# Patient Record
Sex: FEMALE | Race: WHITE | NOT HISPANIC OR LATINO | Employment: OTHER | URBAN - METROPOLITAN AREA
[De-identification: names, ages, dates, MRNs, and addresses within clinical notes are randomized per-mention and may not be internally consistent; named-entity substitution may affect disease eponyms.]

---

## 2020-07-01 ENCOUNTER — OFFICE VISIT (OUTPATIENT)
Dept: INTERNAL MEDICINE | Facility: CLINIC | Age: 80
End: 2020-07-01
Payer: MEDICARE

## 2020-07-01 ENCOUNTER — LAB VISIT (OUTPATIENT)
Dept: LAB | Facility: HOSPITAL | Age: 80
End: 2020-07-01
Attending: HOSPITALIST
Payer: MEDICARE

## 2020-07-01 VITALS
WEIGHT: 234.81 LBS | DIASTOLIC BLOOD PRESSURE: 90 MMHG | TEMPERATURE: 97 F | BODY MASS INDEX: 46.1 KG/M2 | HEART RATE: 76 BPM | SYSTOLIC BLOOD PRESSURE: 150 MMHG | RESPIRATION RATE: 16 BRPM | HEIGHT: 60 IN

## 2020-07-01 DIAGNOSIS — N93.9 ABNORMAL VAGINAL BLEEDING: ICD-10-CM

## 2020-07-01 DIAGNOSIS — I82.502 CHRONIC DEEP VEIN THROMBOSIS (DVT) OF LEFT LOWER EXTREMITY, UNSPECIFIED VEIN: ICD-10-CM

## 2020-07-01 DIAGNOSIS — Z01.00 EYE EXAM, ROUTINE: ICD-10-CM

## 2020-07-01 DIAGNOSIS — Z78.0 POST-MENOPAUSAL: ICD-10-CM

## 2020-07-01 DIAGNOSIS — E66.01 CLASS 3 SEVERE OBESITY DUE TO EXCESS CALORIES WITH SERIOUS COMORBIDITY AND BODY MASS INDEX (BMI) OF 45.0 TO 49.9 IN ADULT: ICD-10-CM

## 2020-07-01 DIAGNOSIS — Z12.12 SCREENING FOR COLORECTAL CANCER: ICD-10-CM

## 2020-07-01 DIAGNOSIS — I10 ESSENTIAL HYPERTENSION: ICD-10-CM

## 2020-07-01 DIAGNOSIS — Z00.00 ENCOUNTER FOR PREVENTIVE HEALTH EXAMINATION: Primary | ICD-10-CM

## 2020-07-01 DIAGNOSIS — Z12.11 SCREENING FOR COLORECTAL CANCER: ICD-10-CM

## 2020-07-01 DIAGNOSIS — I87.2 CHRONIC VENOUS INSUFFICIENCY: ICD-10-CM

## 2020-07-01 DIAGNOSIS — F17.200 TOBACCO DEPENDENCE: ICD-10-CM

## 2020-07-01 PROBLEM — E66.813 CLASS 3 SEVERE OBESITY DUE TO EXCESS CALORIES WITH SERIOUS COMORBIDITY AND BODY MASS INDEX (BMI) OF 45.0 TO 49.9 IN ADULT: Status: ACTIVE | Noted: 2020-07-01

## 2020-07-01 LAB
25(OH)D3+25(OH)D2 SERPL-MCNC: 19 NG/ML (ref 30–96)
ALBUMIN SERPL BCP-MCNC: 3.3 G/DL (ref 3.5–5.2)
ALP SERPL-CCNC: 82 U/L (ref 55–135)
ALT SERPL W/O P-5'-P-CCNC: 11 U/L (ref 10–44)
ANION GAP SERPL CALC-SCNC: 7 MMOL/L (ref 8–16)
AST SERPL-CCNC: 16 U/L (ref 10–40)
BASOPHILS # BLD AUTO: 0.06 K/UL (ref 0–0.2)
BASOPHILS NFR BLD: 0.8 % (ref 0–1.9)
BILIRUB SERPL-MCNC: 0.6 MG/DL (ref 0.1–1)
BUN SERPL-MCNC: 16 MG/DL (ref 8–23)
CALCIUM SERPL-MCNC: 9.4 MG/DL (ref 8.7–10.5)
CHLORIDE SERPL-SCNC: 106 MMOL/L (ref 95–110)
CHOLEST SERPL-MCNC: 178 MG/DL (ref 120–199)
CHOLEST/HDLC SERPL: 4.3 {RATIO} (ref 2–5)
CO2 SERPL-SCNC: 26 MMOL/L (ref 23–29)
CREAT SERPL-MCNC: 1.1 MG/DL (ref 0.5–1.4)
DIFFERENTIAL METHOD: ABNORMAL
EOSINOPHIL # BLD AUTO: 0.3 K/UL (ref 0–0.5)
EOSINOPHIL NFR BLD: 4.1 % (ref 0–8)
ERYTHROCYTE [DISTWIDTH] IN BLOOD BY AUTOMATED COUNT: 13.5 % (ref 11.5–14.5)
EST. GFR  (AFRICAN AMERICAN): 54.8 ML/MIN/1.73 M^2
EST. GFR  (NON AFRICAN AMERICAN): 47.5 ML/MIN/1.73 M^2
GLUCOSE SERPL-MCNC: 92 MG/DL (ref 70–110)
HCT VFR BLD AUTO: 46 % (ref 37–48.5)
HDLC SERPL-MCNC: 41 MG/DL (ref 40–75)
HDLC SERPL: 23 % (ref 20–50)
HGB BLD-MCNC: 14.5 G/DL (ref 12–16)
IMM GRANULOCYTES # BLD AUTO: 0.05 K/UL (ref 0–0.04)
IMM GRANULOCYTES NFR BLD AUTO: 0.7 % (ref 0–0.5)
LDLC SERPL CALC-MCNC: 105.4 MG/DL (ref 63–159)
LYMPHOCYTES # BLD AUTO: 2.2 K/UL (ref 1–4.8)
LYMPHOCYTES NFR BLD: 28.8 % (ref 18–48)
MCH RBC QN AUTO: 30.7 PG (ref 27–31)
MCHC RBC AUTO-ENTMCNC: 31.5 G/DL (ref 32–36)
MCV RBC AUTO: 97 FL (ref 82–98)
MONOCYTES # BLD AUTO: 0.5 K/UL (ref 0.3–1)
MONOCYTES NFR BLD: 6.8 % (ref 4–15)
NEUTROPHILS # BLD AUTO: 4.5 K/UL (ref 1.8–7.7)
NEUTROPHILS NFR BLD: 58.8 % (ref 38–73)
NONHDLC SERPL-MCNC: 137 MG/DL
NRBC BLD-RTO: 0 /100 WBC
PLATELET # BLD AUTO: 245 K/UL (ref 150–350)
PMV BLD AUTO: 9.9 FL (ref 9.2–12.9)
POTASSIUM SERPL-SCNC: 4.6 MMOL/L (ref 3.5–5.1)
PROT SERPL-MCNC: 7.5 G/DL (ref 6–8.4)
RBC # BLD AUTO: 4.73 M/UL (ref 4–5.4)
SODIUM SERPL-SCNC: 139 MMOL/L (ref 136–145)
TRIGL SERPL-MCNC: 158 MG/DL (ref 30–150)
TSH SERPL DL<=0.005 MIU/L-ACNC: 1.64 UIU/ML (ref 0.4–4)
WBC # BLD AUTO: 7.65 K/UL (ref 3.9–12.7)

## 2020-07-01 PROCEDURE — 99999 PR PBB SHADOW E&M-EST. PATIENT-LVL V: ICD-10-PCS | Mod: PBBFAC,,, | Performed by: HOSPITALIST

## 2020-07-01 PROCEDURE — 82306 VITAMIN D 25 HYDROXY: CPT

## 2020-07-01 PROCEDURE — 80061 LIPID PANEL: CPT

## 2020-07-01 PROCEDURE — 99205 OFFICE O/P NEW HI 60 MIN: CPT | Mod: S$PBB,,, | Performed by: HOSPITALIST

## 2020-07-01 PROCEDURE — 80053 COMPREHEN METABOLIC PANEL: CPT

## 2020-07-01 PROCEDURE — 84443 ASSAY THYROID STIM HORMONE: CPT

## 2020-07-01 PROCEDURE — 85025 COMPLETE CBC W/AUTO DIFF WBC: CPT

## 2020-07-01 PROCEDURE — 99215 OFFICE O/P EST HI 40 MIN: CPT | Mod: PBBFAC,PO,25 | Performed by: HOSPITALIST

## 2020-07-01 PROCEDURE — 99999 PR PBB SHADOW E&M-EST. PATIENT-LVL V: CPT | Mod: PBBFAC,,, | Performed by: HOSPITALIST

## 2020-07-01 PROCEDURE — 36415 COLL VENOUS BLD VENIPUNCTURE: CPT | Mod: PO

## 2020-07-01 PROCEDURE — G0009 ADMIN PNEUMOCOCCAL VACCINE: HCPCS | Mod: PBBFAC,PO

## 2020-07-01 PROCEDURE — 99205 PR OFFICE/OUTPT VISIT, NEW, LEVL V, 60-74 MIN: ICD-10-PCS | Mod: S$PBB,,, | Performed by: HOSPITALIST

## 2020-07-01 RX ORDER — IBUPROFEN 200 MG
200 TABLET ORAL EVERY 6 HOURS PRN
Status: ON HOLD | COMMUNITY
End: 2021-11-29 | Stop reason: HOSPADM

## 2020-07-01 RX ORDER — TRIAMCINOLONE ACETONIDE 1 MG/G
CREAM TOPICAL 2 TIMES DAILY
Status: ON HOLD | COMMUNITY
Start: 2020-04-29 | End: 2021-07-30 | Stop reason: HOSPADM

## 2020-07-09 ENCOUNTER — TELEPHONE (OUTPATIENT)
Dept: INTERNAL MEDICINE | Facility: CLINIC | Age: 80
End: 2020-07-09

## 2020-07-09 NOTE — TELEPHONE ENCOUNTER
The patient is requesting a medication for the arthritis pain in her back. Ibuprofen is the only medication on her list.  Please advise.

## 2020-07-09 NOTE — TELEPHONE ENCOUNTER
----- Message from Fallon Ornelas sent at 7/9/2020  4:34 PM CDT -----  Contact: Brianna(daughter)[phone]  Pt daughter states she is calling to follow up on a previous message about her mother. Pt also states she called in about her medication for her back. Please call and advise.

## 2020-07-09 NOTE — TELEPHONE ENCOUNTER
----- Message from Daniela Acosta sent at 7/9/2020 12:26 PM CDT -----  Regarding: Pt self Mobile/Home [phone]  Patient would like a call back in regards to her saying that she was diagnosed with arthritis and she was told that the nurse would find out what type of pain medication she can take for it but she never received a call back.

## 2020-07-10 ENCOUNTER — TELEPHONE (OUTPATIENT)
Dept: TRANSPLANT | Facility: CLINIC | Age: 80
End: 2020-07-10

## 2020-07-10 NOTE — TELEPHONE ENCOUNTER
----- Message from Jennifer Schwartz MD sent at 7/10/2020  7:50 AM CDT -----  Please notify pt:  - Electrolytes, liver are normal. Has kidney disease. Will monitor this periodically  - CBC is normal/acceptable range; no signs of anemia  - TSH (thyroid) level is normal  - Vitamin D level is low; Vitamin D is important for healthy bones and calcium absorption. Recommend a supplement of vitamin D3 1000 I.u daily can find over the counter    - Cholesterol panel is normal/acceptable

## 2020-07-10 NOTE — TELEPHONE ENCOUNTER
----- Message from Ruma Cates sent at 7/10/2020  3:57 PM CDT -----    ----- Message -----  From: Aleena Lima MA  Sent: 7/9/2020   5:04 PM CDT  To: Ruma Cates      ----- Message -----  From: Greg Mcfarlane  Sent: 7/9/2020   4:26 PM CDT  To: Hepatology Scheduling    Pt calling to schedule an appt       [phone]

## 2020-10-26 ENCOUNTER — TELEPHONE (OUTPATIENT)
Dept: INTERNAL MEDICINE | Facility: CLINIC | Age: 80
End: 2020-10-26

## 2020-10-26 DIAGNOSIS — R41.3 MEMORY LOSS: Primary | ICD-10-CM

## 2020-10-26 NOTE — TELEPHONE ENCOUNTER
Referral sent to referral team, referral team will call to make appt with pt daughter Ms VasquezLdpztau-[phone]. Spoke with pt daughter marquise to explain referral has been sent for her mother, and referral team will call to schedule appt.

## 2020-10-26 NOTE — TELEPHONE ENCOUNTER
----- Message from Kedar Leach sent at 10/26/2020  2:26 PM CDT -----  Regarding: Appt  Contact: Daughter Ms VasquezMpahmid-[phone]  Sooner appointment than the  can schedule.  Did you offer to schedule the next available appointment and put the patient on the wait list?:    When is the first available appointment: 12/01/20  What is the nature of the appointment: Memory lose   What visit type: EP   Patient preference of timeframe to be scheduled:    Comments:     Please call an advise  Thank you

## 2021-03-19 ENCOUNTER — TELEPHONE (OUTPATIENT)
Dept: INTERNAL MEDICINE | Facility: CLINIC | Age: 81
End: 2021-03-19

## 2021-07-21 ENCOUNTER — HOSPITAL ENCOUNTER (INPATIENT)
Facility: HOSPITAL | Age: 81
LOS: 9 days | Discharge: NURSING, REHAB OR LONG-TERM CARE | DRG: 299 | End: 2021-07-30
Attending: EMERGENCY MEDICINE | Admitting: HOSPITALIST
Payer: MEDICARE

## 2021-07-21 ENCOUNTER — TELEPHONE (OUTPATIENT)
Dept: INTERNAL MEDICINE | Facility: CLINIC | Age: 81
End: 2021-07-21

## 2021-07-21 DIAGNOSIS — R53.81 DEBILITY: ICD-10-CM

## 2021-07-21 DIAGNOSIS — R00.0 TACHYCARDIA: ICD-10-CM

## 2021-07-21 DIAGNOSIS — I10 ESSENTIAL HYPERTENSION: ICD-10-CM

## 2021-07-21 DIAGNOSIS — N17.9 AKI (ACUTE KIDNEY INJURY): ICD-10-CM

## 2021-07-21 DIAGNOSIS — F17.200 TOBACCO DEPENDENCE: ICD-10-CM

## 2021-07-21 DIAGNOSIS — U07.1 COVID-19: ICD-10-CM

## 2021-07-21 DIAGNOSIS — R00.0 TACHYCARDIA, UNSPECIFIED: ICD-10-CM

## 2021-07-21 DIAGNOSIS — I87.2 CHRONIC VENOUS INSUFFICIENCY: ICD-10-CM

## 2021-07-21 DIAGNOSIS — M79.89 SWELLING OF LOWER LEG: ICD-10-CM

## 2021-07-21 DIAGNOSIS — E55.9 VITAMIN D DEFICIENCY: ICD-10-CM

## 2021-07-21 DIAGNOSIS — I82.4Y1 ACUTE DEEP VEIN THROMBOSIS (DVT) OF PROXIMAL VEIN OF RIGHT LOWER EXTREMITY: Primary | ICD-10-CM

## 2021-07-21 LAB
ALBUMIN SERPL BCP-MCNC: 2.3 G/DL (ref 3.5–5.2)
ALP SERPL-CCNC: 142 U/L (ref 55–135)
ALT SERPL W/O P-5'-P-CCNC: 19 U/L (ref 10–44)
ANION GAP SERPL CALC-SCNC: 13 MMOL/L (ref 8–16)
APTT BLDCRRT: 29.1 SEC (ref 21–32)
AST SERPL-CCNC: 44 U/L (ref 10–40)
BASOPHILS # BLD AUTO: 0.05 K/UL (ref 0–0.2)
BASOPHILS NFR BLD: 0.6 % (ref 0–1.9)
BILIRUB SERPL-MCNC: 0.5 MG/DL (ref 0.1–1)
BNP SERPL-MCNC: 97 PG/ML (ref 0–99)
BUN SERPL-MCNC: 19 MG/DL (ref 8–23)
CALCIUM SERPL-MCNC: 8.1 MG/DL (ref 8.7–10.5)
CHLORIDE SERPL-SCNC: 103 MMOL/L (ref 95–110)
CO2 SERPL-SCNC: 18 MMOL/L (ref 23–29)
CREAT SERPL-MCNC: 1.6 MG/DL (ref 0.5–1.4)
CTP QC/QA: YES
DIFFERENTIAL METHOD: ABNORMAL
EOSINOPHIL # BLD AUTO: 0 K/UL (ref 0–0.5)
EOSINOPHIL NFR BLD: 0.1 % (ref 0–8)
ERYTHROCYTE [DISTWIDTH] IN BLOOD BY AUTOMATED COUNT: 14.8 % (ref 11.5–14.5)
EST. GFR  (AFRICAN AMERICAN): 34.6 ML/MIN/1.73 M^2
EST. GFR  (NON AFRICAN AMERICAN): 30 ML/MIN/1.73 M^2
GLUCOSE SERPL-MCNC: 86 MG/DL (ref 70–110)
HCT VFR BLD AUTO: 44.4 % (ref 37–48.5)
HGB BLD-MCNC: 14.8 G/DL (ref 12–16)
IMM GRANULOCYTES # BLD AUTO: 0.09 K/UL (ref 0–0.04)
IMM GRANULOCYTES NFR BLD AUTO: 1.2 % (ref 0–0.5)
INR PPP: 1.2 (ref 0.8–1.2)
LYMPHOCYTES # BLD AUTO: 1 K/UL (ref 1–4.8)
LYMPHOCYTES NFR BLD: 12.3 % (ref 18–48)
MCH RBC QN AUTO: 30.3 PG (ref 27–31)
MCHC RBC AUTO-ENTMCNC: 33.3 G/DL (ref 32–36)
MCV RBC AUTO: 91 FL (ref 82–98)
MONOCYTES # BLD AUTO: 0.6 K/UL (ref 0.3–1)
MONOCYTES NFR BLD: 7.9 % (ref 4–15)
NEUTROPHILS # BLD AUTO: 6 K/UL (ref 1.8–7.7)
NEUTROPHILS NFR BLD: 77.9 % (ref 38–73)
NRBC BLD-RTO: 0 /100 WBC
PLATELET # BLD AUTO: 247 K/UL (ref 150–450)
PMV BLD AUTO: 10.6 FL (ref 9.2–12.9)
POTASSIUM SERPL-SCNC: 4.2 MMOL/L (ref 3.5–5.1)
PROT SERPL-MCNC: 7.4 G/DL (ref 6–8.4)
PROTHROMBIN TIME: 12.8 SEC (ref 9–12.5)
RBC # BLD AUTO: 4.88 M/UL (ref 4–5.4)
SARS-COV-2 RDRP RESP QL NAA+PROBE: POSITIVE
SODIUM SERPL-SCNC: 134 MMOL/L (ref 136–145)
WBC # BLD AUTO: 7.75 K/UL (ref 3.9–12.7)

## 2021-07-21 PROCEDURE — 25000003 PHARM REV CODE 250: Performed by: HOSPITALIST

## 2021-07-21 PROCEDURE — 99223 1ST HOSP IP/OBS HIGH 75: CPT | Mod: ,,, | Performed by: HOSPITALIST

## 2021-07-21 PROCEDURE — 85730 THROMBOPLASTIN TIME PARTIAL: CPT | Performed by: PHYSICIAN ASSISTANT

## 2021-07-21 PROCEDURE — 12000002 HC ACUTE/MED SURGE SEMI-PRIVATE ROOM

## 2021-07-21 PROCEDURE — 63600175 PHARM REV CODE 636 W HCPCS: Performed by: EMERGENCY MEDICINE

## 2021-07-21 PROCEDURE — 83880 ASSAY OF NATRIURETIC PEPTIDE: CPT | Performed by: PHYSICIAN ASSISTANT

## 2021-07-21 PROCEDURE — 25000003 PHARM REV CODE 250: Performed by: PHYSICIAN ASSISTANT

## 2021-07-21 PROCEDURE — 99285 PR EMERGENCY DEPT VISIT,LEVEL V: ICD-10-PCS | Mod: CR,CS,, | Performed by: PHYSICIAN ASSISTANT

## 2021-07-21 PROCEDURE — 85025 COMPLETE CBC W/AUTO DIFF WBC: CPT | Performed by: PHYSICIAN ASSISTANT

## 2021-07-21 PROCEDURE — 96360 HYDRATION IV INFUSION INIT: CPT

## 2021-07-21 PROCEDURE — 99285 EMERGENCY DEPT VISIT HI MDM: CPT | Mod: 25

## 2021-07-21 PROCEDURE — 99285 EMERGENCY DEPT VISIT HI MDM: CPT | Mod: CR,CS,, | Performed by: PHYSICIAN ASSISTANT

## 2021-07-21 PROCEDURE — 80053 COMPREHEN METABOLIC PANEL: CPT | Performed by: PHYSICIAN ASSISTANT

## 2021-07-21 PROCEDURE — 99223 PR INITIAL HOSPITAL CARE,LEVL III: ICD-10-PCS | Mod: ,,, | Performed by: HOSPITALIST

## 2021-07-21 PROCEDURE — 85610 PROTHROMBIN TIME: CPT | Performed by: PHYSICIAN ASSISTANT

## 2021-07-21 PROCEDURE — U0002 COVID-19 LAB TEST NON-CDC: HCPCS | Performed by: EMERGENCY MEDICINE

## 2021-07-21 RX ORDER — ACETAMINOPHEN 325 MG/1
650 TABLET ORAL EVERY 4 HOURS PRN
Status: DISCONTINUED | OUTPATIENT
Start: 2021-07-21 | End: 2021-07-30 | Stop reason: HOSPADM

## 2021-07-21 RX ORDER — ENOXAPARIN SODIUM 150 MG/ML
1 INJECTION SUBCUTANEOUS
Status: DISCONTINUED | OUTPATIENT
Start: 2021-07-21 | End: 2021-07-21

## 2021-07-21 RX ORDER — ASCORBIC ACID 500 MG
500 TABLET ORAL 2 TIMES DAILY
Status: DISCONTINUED | OUTPATIENT
Start: 2021-07-21 | End: 2021-07-30 | Stop reason: HOSPADM

## 2021-07-21 RX ORDER — ALBUTEROL SULFATE 90 UG/1
2 AEROSOL, METERED RESPIRATORY (INHALATION) EVERY 6 HOURS
Status: DISCONTINUED | OUTPATIENT
Start: 2021-07-22 | End: 2021-07-21

## 2021-07-21 RX ORDER — SODIUM CHLORIDE 0.9 % (FLUSH) 0.9 %
10 SYRINGE (ML) INJECTION
Status: DISCONTINUED | OUTPATIENT
Start: 2021-07-21 | End: 2021-07-30 | Stop reason: HOSPADM

## 2021-07-21 RX ORDER — TALC
6 POWDER (GRAM) TOPICAL NIGHTLY PRN
Status: DISCONTINUED | OUTPATIENT
Start: 2021-07-21 | End: 2021-07-30 | Stop reason: HOSPADM

## 2021-07-21 RX ORDER — IBUPROFEN 200 MG
16 TABLET ORAL
Status: DISCONTINUED | OUTPATIENT
Start: 2021-07-21 | End: 2021-07-30 | Stop reason: HOSPADM

## 2021-07-21 RX ORDER — ONDANSETRON 2 MG/ML
4 INJECTION INTRAMUSCULAR; INTRAVENOUS EVERY 8 HOURS PRN
Status: DISCONTINUED | OUTPATIENT
Start: 2021-07-21 | End: 2021-07-30 | Stop reason: HOSPADM

## 2021-07-21 RX ORDER — PROCHLORPERAZINE EDISYLATE 5 MG/ML
5 INJECTION INTRAMUSCULAR; INTRAVENOUS EVERY 6 HOURS PRN
Status: DISCONTINUED | OUTPATIENT
Start: 2021-07-21 | End: 2021-07-30 | Stop reason: HOSPADM

## 2021-07-21 RX ORDER — SODIUM CHLORIDE 9 MG/ML
INJECTION, SOLUTION INTRAVENOUS CONTINUOUS
Status: ACTIVE | OUTPATIENT
Start: 2021-07-21 | End: 2021-07-22

## 2021-07-21 RX ORDER — HEPARIN SODIUM,PORCINE/D5W 25000/250
0-40 INTRAVENOUS SOLUTION INTRAVENOUS CONTINUOUS
Status: DISCONTINUED | OUTPATIENT
Start: 2021-07-21 | End: 2021-07-22

## 2021-07-21 RX ORDER — IBUPROFEN 200 MG
24 TABLET ORAL
Status: DISCONTINUED | OUTPATIENT
Start: 2021-07-21 | End: 2021-07-30 | Stop reason: HOSPADM

## 2021-07-21 RX ORDER — CHOLECALCIFEROL (VITAMIN D3) 25 MCG
1000 TABLET ORAL DAILY
Status: DISCONTINUED | OUTPATIENT
Start: 2021-07-22 | End: 2021-07-30 | Stop reason: HOSPADM

## 2021-07-21 RX ADMIN — SODIUM CHLORIDE 1000 ML: 0.9 INJECTION, SOLUTION INTRAVENOUS at 06:07

## 2021-07-21 RX ADMIN — HEPARIN SODIUM AND DEXTROSE 18 UNITS/KG/HR: 10000; 5 INJECTION INTRAVENOUS at 08:07

## 2021-07-21 RX ADMIN — Medication 500 MG: at 08:07

## 2021-07-21 RX ADMIN — SODIUM CHLORIDE: 0.9 INJECTION, SOLUTION INTRAVENOUS at 08:07

## 2021-07-22 LAB
25(OH)D3+25(OH)D2 SERPL-MCNC: 8 NG/ML (ref 30–96)
ALBUMIN SERPL BCP-MCNC: 1.7 G/DL (ref 3.5–5.2)
ALP SERPL-CCNC: 104 U/L (ref 55–135)
ALT SERPL W/O P-5'-P-CCNC: 14 U/L (ref 10–44)
ANION GAP SERPL CALC-SCNC: 7 MMOL/L (ref 8–16)
APTT BLDCRRT: >150 SEC (ref 21–32)
AST SERPL-CCNC: 32 U/L (ref 10–40)
BACTERIA #/AREA URNS AUTO: ABNORMAL /HPF
BASOPHILS # BLD AUTO: 0.03 K/UL (ref 0–0.2)
BASOPHILS NFR BLD: 0.4 % (ref 0–1.9)
BILIRUB SERPL-MCNC: 0.4 MG/DL (ref 0.1–1)
BILIRUB UR QL STRIP: NEGATIVE
BUN SERPL-MCNC: 16 MG/DL (ref 8–23)
CALCIUM SERPL-MCNC: 7.3 MG/DL (ref 8.7–10.5)
CHLORIDE SERPL-SCNC: 112 MMOL/L (ref 95–110)
CLARITY UR REFRACT.AUTO: ABNORMAL
CO2 SERPL-SCNC: 20 MMOL/L (ref 23–29)
COLOR UR AUTO: ABNORMAL
CREAT SERPL-MCNC: 1.1 MG/DL (ref 0.5–1.4)
CREAT UR-MCNC: 194 MG/DL (ref 15–325)
DIFFERENTIAL METHOD: ABNORMAL
EOSINOPHIL # BLD AUTO: 0.1 K/UL (ref 0–0.5)
EOSINOPHIL NFR BLD: 1.6 % (ref 0–8)
ERYTHROCYTE [DISTWIDTH] IN BLOOD BY AUTOMATED COUNT: 14.8 % (ref 11.5–14.5)
EST. GFR  (AFRICAN AMERICAN): 54.4 ML/MIN/1.73 M^2
EST. GFR  (NON AFRICAN AMERICAN): 47.2 ML/MIN/1.73 M^2
GLUCOSE SERPL-MCNC: 81 MG/DL (ref 70–110)
GLUCOSE UR QL STRIP: NEGATIVE
HCT VFR BLD AUTO: 36.7 % (ref 37–48.5)
HGB BLD-MCNC: 11.9 G/DL (ref 12–16)
HGB UR QL STRIP: ABNORMAL
IMM GRANULOCYTES # BLD AUTO: 0.05 K/UL (ref 0–0.04)
IMM GRANULOCYTES NFR BLD AUTO: 0.7 % (ref 0–0.5)
KETONES UR QL STRIP: NEGATIVE
LEUKOCYTE ESTERASE UR QL STRIP: ABNORMAL
LYMPHOCYTES # BLD AUTO: 1.3 K/UL (ref 1–4.8)
LYMPHOCYTES NFR BLD: 18.5 % (ref 18–48)
MCH RBC QN AUTO: 29.1 PG (ref 27–31)
MCHC RBC AUTO-ENTMCNC: 32.4 G/DL (ref 32–36)
MCV RBC AUTO: 90 FL (ref 82–98)
MICROSCOPIC COMMENT: ABNORMAL
MONOCYTES # BLD AUTO: 0.4 K/UL (ref 0.3–1)
MONOCYTES NFR BLD: 6.5 % (ref 4–15)
NEUTROPHILS # BLD AUTO: 4.9 K/UL (ref 1.8–7.7)
NEUTROPHILS NFR BLD: 72.3 % (ref 38–73)
NITRITE UR QL STRIP: NEGATIVE
NRBC BLD-RTO: 0 /100 WBC
PH UR STRIP: 5 [PH] (ref 5–8)
PLATELET # BLD AUTO: 194 K/UL (ref 150–450)
PMV BLD AUTO: 9.6 FL (ref 9.2–12.9)
POTASSIUM SERPL-SCNC: 4.1 MMOL/L (ref 3.5–5.1)
PROT SERPL-MCNC: 5.5 G/DL (ref 6–8.4)
PROT UR QL STRIP: NEGATIVE
RBC # BLD AUTO: 4.09 M/UL (ref 4–5.4)
RBC #/AREA URNS AUTO: 10 /HPF (ref 0–4)
SODIUM SERPL-SCNC: 139 MMOL/L (ref 136–145)
SODIUM UR-SCNC: 27 MMOL/L (ref 20–250)
SP GR UR STRIP: 1.02 (ref 1–1.03)
SQUAMOUS #/AREA URNS AUTO: 1 /HPF
URN SPEC COLLECT METH UR: ABNORMAL
WBC # BLD AUTO: 6.8 K/UL (ref 3.9–12.7)
WBC #/AREA URNS AUTO: 89 /HPF (ref 0–5)
WBC CLUMPS UR QL AUTO: ABNORMAL

## 2021-07-22 PROCEDURE — 80053 COMPREHEN METABOLIC PANEL: CPT | Performed by: HOSPITALIST

## 2021-07-22 PROCEDURE — 87088 URINE BACTERIA CULTURE: CPT | Performed by: PHYSICIAN ASSISTANT

## 2021-07-22 PROCEDURE — 25000003 PHARM REV CODE 250: Performed by: HOSPITALIST

## 2021-07-22 PROCEDURE — 82306 VITAMIN D 25 HYDROXY: CPT | Performed by: HOSPITALIST

## 2021-07-22 PROCEDURE — 99233 SBSQ HOSP IP/OBS HIGH 50: CPT | Mod: ,,, | Performed by: HOSPITALIST

## 2021-07-22 PROCEDURE — 97162 PT EVAL MOD COMPLEX 30 MIN: CPT

## 2021-07-22 PROCEDURE — 20600001 HC STEP DOWN PRIVATE ROOM

## 2021-07-22 PROCEDURE — 99223 1ST HOSP IP/OBS HIGH 75: CPT | Mod: ,,, | Performed by: SURGERY

## 2021-07-22 PROCEDURE — 87186 SC STD MICRODIL/AGAR DIL: CPT | Performed by: PHYSICIAN ASSISTANT

## 2021-07-22 PROCEDURE — 85025 COMPLETE CBC W/AUTO DIFF WBC: CPT | Performed by: HOSPITALIST

## 2021-07-22 PROCEDURE — 99223 PR INITIAL HOSPITAL CARE,LEVL III: ICD-10-PCS | Mod: ,,, | Performed by: SURGERY

## 2021-07-22 PROCEDURE — 85730 THROMBOPLASTIN TIME PARTIAL: CPT | Performed by: HOSPITALIST

## 2021-07-22 PROCEDURE — 84300 ASSAY OF URINE SODIUM: CPT | Performed by: HOSPITALIST

## 2021-07-22 PROCEDURE — 81001 URINALYSIS AUTO W/SCOPE: CPT | Performed by: PHYSICIAN ASSISTANT

## 2021-07-22 PROCEDURE — 87077 CULTURE AEROBIC IDENTIFY: CPT | Performed by: PHYSICIAN ASSISTANT

## 2021-07-22 PROCEDURE — 87086 URINE CULTURE/COLONY COUNT: CPT | Performed by: PHYSICIAN ASSISTANT

## 2021-07-22 PROCEDURE — 99233 PR SUBSEQUENT HOSPITAL CARE,LEVL III: ICD-10-PCS | Mod: ,,, | Performed by: HOSPITALIST

## 2021-07-22 PROCEDURE — 36415 COLL VENOUS BLD VENIPUNCTURE: CPT | Performed by: HOSPITALIST

## 2021-07-22 PROCEDURE — 82570 ASSAY OF URINE CREATININE: CPT | Performed by: HOSPITALIST

## 2021-07-22 PROCEDURE — 97165 OT EVAL LOW COMPLEX 30 MIN: CPT

## 2021-07-22 RX ORDER — APIXABAN 5 MG (74)
KIT ORAL
Qty: 74 TABLET | Refills: 0 | Status: SHIPPED | OUTPATIENT
Start: 2021-07-22 | End: 2021-07-30 | Stop reason: HOSPADM

## 2021-07-22 RX ADMIN — SODIUM CHLORIDE 125 ML/HR: 0.9 INJECTION, SOLUTION INTRAVENOUS at 05:07

## 2021-07-22 RX ADMIN — THERA TABS 1 TABLET: TAB at 09:07

## 2021-07-22 RX ADMIN — CHOLECALCIFEROL (VITAMIN D3) 25 MCG (1,000 UNIT) TABLET 1000 UNITS: TABLET at 09:07

## 2021-07-22 RX ADMIN — Medication 500 MG: at 09:07

## 2021-07-22 RX ADMIN — APIXABAN 10 MG: 5 TABLET, FILM COATED ORAL at 09:07

## 2021-07-23 LAB
ALBUMIN SERPL BCP-MCNC: 1.8 G/DL (ref 3.5–5.2)
ALP SERPL-CCNC: 107 U/L (ref 55–135)
ALT SERPL W/O P-5'-P-CCNC: 16 U/L (ref 10–44)
ANION GAP SERPL CALC-SCNC: 10 MMOL/L (ref 8–16)
AST SERPL-CCNC: 38 U/L (ref 10–40)
BASOPHILS # BLD AUTO: 0.03 K/UL (ref 0–0.2)
BASOPHILS NFR BLD: 0.5 % (ref 0–1.9)
BILIRUB SERPL-MCNC: 0.4 MG/DL (ref 0.1–1)
BUN SERPL-MCNC: 11 MG/DL (ref 8–23)
CALCIUM SERPL-MCNC: 7.4 MG/DL (ref 8.7–10.5)
CHLORIDE SERPL-SCNC: 110 MMOL/L (ref 95–110)
CO2 SERPL-SCNC: 19 MMOL/L (ref 23–29)
CREAT SERPL-MCNC: 0.9 MG/DL (ref 0.5–1.4)
CRP SERPL-MCNC: 49.2 MG/L (ref 0–8.2)
DIFFERENTIAL METHOD: ABNORMAL
EOSINOPHIL # BLD AUTO: 0.1 K/UL (ref 0–0.5)
EOSINOPHIL NFR BLD: 1 % (ref 0–8)
ERYTHROCYTE [DISTWIDTH] IN BLOOD BY AUTOMATED COUNT: 15.1 % (ref 11.5–14.5)
EST. GFR  (AFRICAN AMERICAN): >60 ML/MIN/1.73 M^2
EST. GFR  (NON AFRICAN AMERICAN): >60 ML/MIN/1.73 M^2
FERRITIN SERPL-MCNC: 2064 NG/ML (ref 20–300)
GLUCOSE SERPL-MCNC: 67 MG/DL (ref 70–110)
HCT VFR BLD AUTO: 41.5 % (ref 37–48.5)
HGB BLD-MCNC: 13.3 G/DL (ref 12–16)
IMM GRANULOCYTES # BLD AUTO: 0.06 K/UL (ref 0–0.04)
IMM GRANULOCYTES NFR BLD AUTO: 1 % (ref 0–0.5)
LYMPHOCYTES # BLD AUTO: 1.1 K/UL (ref 1–4.8)
LYMPHOCYTES NFR BLD: 17.5 % (ref 18–48)
MCH RBC QN AUTO: 30.1 PG (ref 27–31)
MCHC RBC AUTO-ENTMCNC: 32 G/DL (ref 32–36)
MCV RBC AUTO: 94 FL (ref 82–98)
MONOCYTES # BLD AUTO: 0.3 K/UL (ref 0.3–1)
MONOCYTES NFR BLD: 5.3 % (ref 4–15)
NEUTROPHILS # BLD AUTO: 4.5 K/UL (ref 1.8–7.7)
NEUTROPHILS NFR BLD: 74.7 % (ref 38–73)
NRBC BLD-RTO: 0 /100 WBC
PLATELET # BLD AUTO: 150 K/UL (ref 150–450)
PMV BLD AUTO: 10.4 FL (ref 9.2–12.9)
POTASSIUM SERPL-SCNC: 3.4 MMOL/L (ref 3.5–5.1)
PROT SERPL-MCNC: 5.8 G/DL (ref 6–8.4)
RBC # BLD AUTO: 4.42 M/UL (ref 4–5.4)
SODIUM SERPL-SCNC: 139 MMOL/L (ref 136–145)
WBC # BLD AUTO: 6.04 K/UL (ref 3.9–12.7)

## 2021-07-23 PROCEDURE — 25000003 PHARM REV CODE 250: Performed by: HOSPITALIST

## 2021-07-23 PROCEDURE — 80053 COMPREHEN METABOLIC PANEL: CPT | Performed by: HOSPITALIST

## 2021-07-23 PROCEDURE — 36415 COLL VENOUS BLD VENIPUNCTURE: CPT | Performed by: HOSPITALIST

## 2021-07-23 PROCEDURE — 82728 ASSAY OF FERRITIN: CPT | Performed by: HOSPITALIST

## 2021-07-23 PROCEDURE — 20600001 HC STEP DOWN PRIVATE ROOM

## 2021-07-23 PROCEDURE — 85025 COMPLETE CBC W/AUTO DIFF WBC: CPT | Performed by: HOSPITALIST

## 2021-07-23 PROCEDURE — 99233 SBSQ HOSP IP/OBS HIGH 50: CPT | Mod: ,,, | Performed by: HOSPITALIST

## 2021-07-23 PROCEDURE — 94761 N-INVAS EAR/PLS OXIMETRY MLT: CPT

## 2021-07-23 PROCEDURE — 86140 C-REACTIVE PROTEIN: CPT | Performed by: HOSPITALIST

## 2021-07-23 PROCEDURE — 99233 PR SUBSEQUENT HOSPITAL CARE,LEVL III: ICD-10-PCS | Mod: ,,, | Performed by: HOSPITALIST

## 2021-07-23 RX ADMIN — CHOLECALCIFEROL (VITAMIN D3) 25 MCG (1,000 UNIT) TABLET 1000 UNITS: TABLET at 09:07

## 2021-07-23 RX ADMIN — Medication 500 MG: at 09:07

## 2021-07-23 RX ADMIN — APIXABAN 10 MG: 5 TABLET, FILM COATED ORAL at 09:07

## 2021-07-23 RX ADMIN — Medication 500 MG: at 10:07

## 2021-07-23 RX ADMIN — APIXABAN 10 MG: 5 TABLET, FILM COATED ORAL at 10:07

## 2021-07-23 RX ADMIN — THERA TABS 1 TABLET: TAB at 09:07

## 2021-07-24 LAB
ALBUMIN SERPL BCP-MCNC: 1.8 G/DL (ref 3.5–5.2)
ALP SERPL-CCNC: 105 U/L (ref 55–135)
ALT SERPL W/O P-5'-P-CCNC: 18 U/L (ref 10–44)
ANION GAP SERPL CALC-SCNC: 6 MMOL/L (ref 8–16)
AST SERPL-CCNC: 45 U/L (ref 10–40)
BACTERIA UR CULT: ABNORMAL
BASOPHILS # BLD AUTO: 0.03 K/UL (ref 0–0.2)
BASOPHILS NFR BLD: 0.7 % (ref 0–1.9)
BILIRUB SERPL-MCNC: 0.5 MG/DL (ref 0.1–1)
BUN SERPL-MCNC: 9 MG/DL (ref 8–23)
CALCIUM SERPL-MCNC: 7.4 MG/DL (ref 8.7–10.5)
CHLORIDE SERPL-SCNC: 111 MMOL/L (ref 95–110)
CO2 SERPL-SCNC: 20 MMOL/L (ref 23–29)
CREAT SERPL-MCNC: 0.8 MG/DL (ref 0.5–1.4)
DIFFERENTIAL METHOD: ABNORMAL
EOSINOPHIL # BLD AUTO: 0 K/UL (ref 0–0.5)
EOSINOPHIL NFR BLD: 0.4 % (ref 0–8)
ERYTHROCYTE [DISTWIDTH] IN BLOOD BY AUTOMATED COUNT: 15 % (ref 11.5–14.5)
EST. GFR  (AFRICAN AMERICAN): >60 ML/MIN/1.73 M^2
EST. GFR  (NON AFRICAN AMERICAN): >60 ML/MIN/1.73 M^2
GLUCOSE SERPL-MCNC: 74 MG/DL (ref 70–110)
HCT VFR BLD AUTO: 40.9 % (ref 37–48.5)
HGB BLD-MCNC: 13.7 G/DL (ref 12–16)
IMM GRANULOCYTES # BLD AUTO: 0.06 K/UL (ref 0–0.04)
IMM GRANULOCYTES NFR BLD AUTO: 1.3 % (ref 0–0.5)
LYMPHOCYTES # BLD AUTO: 1 K/UL (ref 1–4.8)
LYMPHOCYTES NFR BLD: 20.9 % (ref 18–48)
MCH RBC QN AUTO: 29.8 PG (ref 27–31)
MCHC RBC AUTO-ENTMCNC: 33.5 G/DL (ref 32–36)
MCV RBC AUTO: 89 FL (ref 82–98)
MONOCYTES # BLD AUTO: 0.4 K/UL (ref 0.3–1)
MONOCYTES NFR BLD: 7.7 % (ref 4–15)
NEUTROPHILS # BLD AUTO: 3.1 K/UL (ref 1.8–7.7)
NEUTROPHILS NFR BLD: 69 % (ref 38–73)
NRBC BLD-RTO: 0 /100 WBC
PLATELET # BLD AUTO: 195 K/UL (ref 150–450)
PMV BLD AUTO: 9.3 FL (ref 9.2–12.9)
POTASSIUM SERPL-SCNC: 3.6 MMOL/L (ref 3.5–5.1)
PROT SERPL-MCNC: 5.7 G/DL (ref 6–8.4)
RBC # BLD AUTO: 4.6 M/UL (ref 4–5.4)
SODIUM SERPL-SCNC: 137 MMOL/L (ref 136–145)
WBC # BLD AUTO: 4.55 K/UL (ref 3.9–12.7)

## 2021-07-24 PROCEDURE — 99232 PR SUBSEQUENT HOSPITAL CARE,LEVL II: ICD-10-PCS | Mod: ,,, | Performed by: HOSPITALIST

## 2021-07-24 PROCEDURE — 25000003 PHARM REV CODE 250: Performed by: HOSPITALIST

## 2021-07-24 PROCEDURE — 99232 SBSQ HOSP IP/OBS MODERATE 35: CPT | Mod: ,,, | Performed by: HOSPITALIST

## 2021-07-24 PROCEDURE — 20600001 HC STEP DOWN PRIVATE ROOM

## 2021-07-24 PROCEDURE — 80053 COMPREHEN METABOLIC PANEL: CPT | Performed by: HOSPITALIST

## 2021-07-24 PROCEDURE — 36415 COLL VENOUS BLD VENIPUNCTURE: CPT | Performed by: HOSPITALIST

## 2021-07-24 PROCEDURE — 85025 COMPLETE CBC W/AUTO DIFF WBC: CPT | Performed by: HOSPITALIST

## 2021-07-24 RX ADMIN — APIXABAN 10 MG: 5 TABLET, FILM COATED ORAL at 08:07

## 2021-07-24 RX ADMIN — THERA TABS 1 TABLET: TAB at 09:07

## 2021-07-24 RX ADMIN — MELATONIN TAB 3 MG 6 MG: 3 TAB at 08:07

## 2021-07-24 RX ADMIN — ACETAMINOPHEN 650 MG: 325 TABLET ORAL at 08:07

## 2021-07-24 RX ADMIN — CHOLECALCIFEROL (VITAMIN D3) 25 MCG (1,000 UNIT) TABLET 1000 UNITS: TABLET at 09:07

## 2021-07-24 RX ADMIN — Medication 500 MG: at 08:07

## 2021-07-24 RX ADMIN — Medication 500 MG: at 09:07

## 2021-07-24 RX ADMIN — APIXABAN 10 MG: 5 TABLET, FILM COATED ORAL at 09:07

## 2021-07-25 PROBLEM — E55.9 VITAMIN D DEFICIENCY: Status: ACTIVE | Noted: 2021-07-25

## 2021-07-25 LAB
ALBUMIN SERPL BCP-MCNC: 1.7 G/DL (ref 3.5–5.2)
ALP SERPL-CCNC: 101 U/L (ref 55–135)
ALT SERPL W/O P-5'-P-CCNC: 16 U/L (ref 10–44)
ANION GAP SERPL CALC-SCNC: 8 MMOL/L (ref 8–16)
AST SERPL-CCNC: 49 U/L (ref 10–40)
BASOPHILS # BLD AUTO: 0.02 K/UL (ref 0–0.2)
BASOPHILS NFR BLD: 0.4 % (ref 0–1.9)
BILIRUB SERPL-MCNC: 0.5 MG/DL (ref 0.1–1)
BUN SERPL-MCNC: 12 MG/DL (ref 8–23)
CALCIUM SERPL-MCNC: 7.4 MG/DL (ref 8.7–10.5)
CHLORIDE SERPL-SCNC: 108 MMOL/L (ref 95–110)
CO2 SERPL-SCNC: 18 MMOL/L (ref 23–29)
CREAT SERPL-MCNC: 1 MG/DL (ref 0.5–1.4)
CRP SERPL-MCNC: 54.3 MG/L (ref 0–8.2)
DIFFERENTIAL METHOD: ABNORMAL
EOSINOPHIL # BLD AUTO: 0.1 K/UL (ref 0–0.5)
EOSINOPHIL NFR BLD: 1.9 % (ref 0–8)
ERYTHROCYTE [DISTWIDTH] IN BLOOD BY AUTOMATED COUNT: 15.1 % (ref 11.5–14.5)
EST. GFR  (AFRICAN AMERICAN): >60 ML/MIN/1.73 M^2
EST. GFR  (NON AFRICAN AMERICAN): 53 ML/MIN/1.73 M^2
FERRITIN SERPL-MCNC: 3156 NG/ML (ref 20–300)
GLUCOSE SERPL-MCNC: 81 MG/DL (ref 70–110)
HCT VFR BLD AUTO: 41.4 % (ref 37–48.5)
HGB BLD-MCNC: 13.8 G/DL (ref 12–16)
IMM GRANULOCYTES # BLD AUTO: 0.07 K/UL (ref 0–0.04)
IMM GRANULOCYTES NFR BLD AUTO: 1.5 % (ref 0–0.5)
LYMPHOCYTES # BLD AUTO: 1.3 K/UL (ref 1–4.8)
LYMPHOCYTES NFR BLD: 26.7 % (ref 18–48)
MCH RBC QN AUTO: 29.9 PG (ref 27–31)
MCHC RBC AUTO-ENTMCNC: 33.3 G/DL (ref 32–36)
MCV RBC AUTO: 90 FL (ref 82–98)
MONOCYTES # BLD AUTO: 0.4 K/UL (ref 0.3–1)
MONOCYTES NFR BLD: 7.5 % (ref 4–15)
NEUTROPHILS # BLD AUTO: 3 K/UL (ref 1.8–7.7)
NEUTROPHILS NFR BLD: 62 % (ref 38–73)
NRBC BLD-RTO: 0 /100 WBC
PLATELET # BLD AUTO: 200 K/UL (ref 150–450)
PMV BLD AUTO: 9.4 FL (ref 9.2–12.9)
POTASSIUM SERPL-SCNC: 3.7 MMOL/L (ref 3.5–5.1)
PROT SERPL-MCNC: 5.6 G/DL (ref 6–8.4)
RBC # BLD AUTO: 4.61 M/UL (ref 4–5.4)
SODIUM SERPL-SCNC: 134 MMOL/L (ref 136–145)
WBC # BLD AUTO: 4.79 K/UL (ref 3.9–12.7)

## 2021-07-25 PROCEDURE — 82728 ASSAY OF FERRITIN: CPT | Performed by: HOSPITALIST

## 2021-07-25 PROCEDURE — 25000003 PHARM REV CODE 250: Performed by: HOSPITALIST

## 2021-07-25 PROCEDURE — 86140 C-REACTIVE PROTEIN: CPT | Performed by: HOSPITALIST

## 2021-07-25 PROCEDURE — 85025 COMPLETE CBC W/AUTO DIFF WBC: CPT | Performed by: HOSPITALIST

## 2021-07-25 PROCEDURE — 20600001 HC STEP DOWN PRIVATE ROOM

## 2021-07-25 PROCEDURE — 99233 SBSQ HOSP IP/OBS HIGH 50: CPT | Mod: 95,,, | Performed by: INTERNAL MEDICINE

## 2021-07-25 PROCEDURE — 99233 PR SUBSEQUENT HOSPITAL CARE,LEVL III: ICD-10-PCS | Mod: 95,,, | Performed by: INTERNAL MEDICINE

## 2021-07-25 PROCEDURE — 80053 COMPREHEN METABOLIC PANEL: CPT | Performed by: HOSPITALIST

## 2021-07-25 PROCEDURE — 36415 COLL VENOUS BLD VENIPUNCTURE: CPT | Performed by: HOSPITALIST

## 2021-07-25 RX ADMIN — Medication 500 MG: at 08:07

## 2021-07-25 RX ADMIN — MELATONIN TAB 3 MG 6 MG: 3 TAB at 08:07

## 2021-07-25 RX ADMIN — APIXABAN 10 MG: 5 TABLET, FILM COATED ORAL at 08:07

## 2021-07-25 RX ADMIN — CHOLECALCIFEROL (VITAMIN D3) 25 MCG (1,000 UNIT) TABLET 1000 UNITS: TABLET at 08:07

## 2021-07-25 RX ADMIN — THERA TABS 1 TABLET: TAB at 08:07

## 2021-07-26 LAB
ALBUMIN SERPL BCP-MCNC: 1.6 G/DL (ref 3.5–5.2)
ALP SERPL-CCNC: 100 U/L (ref 55–135)
ALT SERPL W/O P-5'-P-CCNC: 19 U/L (ref 10–44)
ANION GAP SERPL CALC-SCNC: 12 MMOL/L (ref 8–16)
AST SERPL-CCNC: 61 U/L (ref 10–40)
BASOPHILS # BLD AUTO: 0.03 K/UL (ref 0–0.2)
BASOPHILS NFR BLD: 0.5 % (ref 0–1.9)
BILIRUB SERPL-MCNC: 0.5 MG/DL (ref 0.1–1)
BUN SERPL-MCNC: 13 MG/DL (ref 8–23)
CALCIUM SERPL-MCNC: 7.5 MG/DL (ref 8.7–10.5)
CHLORIDE SERPL-SCNC: 110 MMOL/L (ref 95–110)
CO2 SERPL-SCNC: 14 MMOL/L (ref 23–29)
CREAT SERPL-MCNC: 0.8 MG/DL (ref 0.5–1.4)
DIFFERENTIAL METHOD: ABNORMAL
EOSINOPHIL # BLD AUTO: 0.1 K/UL (ref 0–0.5)
EOSINOPHIL NFR BLD: 1.8 % (ref 0–8)
ERYTHROCYTE [DISTWIDTH] IN BLOOD BY AUTOMATED COUNT: 14.9 % (ref 11.5–14.5)
EST. GFR  (AFRICAN AMERICAN): >60 ML/MIN/1.73 M^2
EST. GFR  (NON AFRICAN AMERICAN): >60 ML/MIN/1.73 M^2
GLUCOSE SERPL-MCNC: 72 MG/DL (ref 70–110)
HCT VFR BLD AUTO: 40.1 % (ref 37–48.5)
HGB BLD-MCNC: 13.8 G/DL (ref 12–16)
IMM GRANULOCYTES # BLD AUTO: 0.08 K/UL (ref 0–0.04)
IMM GRANULOCYTES NFR BLD AUTO: 1.4 % (ref 0–0.5)
LYMPHOCYTES # BLD AUTO: 1.4 K/UL (ref 1–4.8)
LYMPHOCYTES NFR BLD: 25.4 % (ref 18–48)
MCH RBC QN AUTO: 30.3 PG (ref 27–31)
MCHC RBC AUTO-ENTMCNC: 34.4 G/DL (ref 32–36)
MCV RBC AUTO: 88 FL (ref 82–98)
MONOCYTES # BLD AUTO: 0.3 K/UL (ref 0.3–1)
MONOCYTES NFR BLD: 5.2 % (ref 4–15)
NEUTROPHILS # BLD AUTO: 3.6 K/UL (ref 1.8–7.7)
NEUTROPHILS NFR BLD: 65.7 % (ref 38–73)
NRBC BLD-RTO: 0 /100 WBC
PLATELET # BLD AUTO: 205 K/UL (ref 150–450)
PMV BLD AUTO: 9.5 FL (ref 9.2–12.9)
POTASSIUM SERPL-SCNC: 4.2 MMOL/L (ref 3.5–5.1)
PROT SERPL-MCNC: 5.5 G/DL (ref 6–8.4)
RBC # BLD AUTO: 4.56 M/UL (ref 4–5.4)
SODIUM SERPL-SCNC: 136 MMOL/L (ref 136–145)
WBC # BLD AUTO: 5.55 K/UL (ref 3.9–12.7)

## 2021-07-26 PROCEDURE — 99233 PR SUBSEQUENT HOSPITAL CARE,LEVL III: ICD-10-PCS | Mod: 95,,, | Performed by: INTERNAL MEDICINE

## 2021-07-26 PROCEDURE — 85025 COMPLETE CBC W/AUTO DIFF WBC: CPT | Performed by: HOSPITALIST

## 2021-07-26 PROCEDURE — 99233 SBSQ HOSP IP/OBS HIGH 50: CPT | Mod: 95,,, | Performed by: INTERNAL MEDICINE

## 2021-07-26 PROCEDURE — 20600001 HC STEP DOWN PRIVATE ROOM

## 2021-07-26 PROCEDURE — 97110 THERAPEUTIC EXERCISES: CPT

## 2021-07-26 PROCEDURE — 36415 COLL VENOUS BLD VENIPUNCTURE: CPT | Performed by: HOSPITALIST

## 2021-07-26 PROCEDURE — 25000003 PHARM REV CODE 250: Performed by: HOSPITALIST

## 2021-07-26 PROCEDURE — 80053 COMPREHEN METABOLIC PANEL: CPT | Performed by: HOSPITALIST

## 2021-07-26 PROCEDURE — 97535 SELF CARE MNGMENT TRAINING: CPT

## 2021-07-26 PROCEDURE — 97530 THERAPEUTIC ACTIVITIES: CPT

## 2021-07-26 RX ADMIN — APIXABAN 10 MG: 5 TABLET, FILM COATED ORAL at 09:07

## 2021-07-26 RX ADMIN — MELATONIN TAB 3 MG 6 MG: 3 TAB at 08:07

## 2021-07-26 RX ADMIN — Medication 500 MG: at 08:07

## 2021-07-26 RX ADMIN — APIXABAN 10 MG: 5 TABLET, FILM COATED ORAL at 08:07

## 2021-07-26 RX ADMIN — Medication 500 MG: at 09:07

## 2021-07-26 RX ADMIN — THERA TABS 1 TABLET: TAB at 09:07

## 2021-07-26 RX ADMIN — CHOLECALCIFEROL (VITAMIN D3) 25 MCG (1,000 UNIT) TABLET 1000 UNITS: TABLET at 09:07

## 2021-07-27 LAB
ALBUMIN SERPL BCP-MCNC: 1.7 G/DL (ref 3.5–5.2)
ALP SERPL-CCNC: 109 U/L (ref 55–135)
ALT SERPL W/O P-5'-P-CCNC: 22 U/L (ref 10–44)
ANION GAP SERPL CALC-SCNC: 10 MMOL/L (ref 8–16)
AST SERPL-CCNC: 59 U/L (ref 10–40)
BASOPHILS # BLD AUTO: 0.02 K/UL (ref 0–0.2)
BASOPHILS NFR BLD: 0.4 % (ref 0–1.9)
BILIRUB SERPL-MCNC: 0.5 MG/DL (ref 0.1–1)
BUN SERPL-MCNC: 15 MG/DL (ref 8–23)
CALCIUM SERPL-MCNC: 7.5 MG/DL (ref 8.7–10.5)
CHLORIDE SERPL-SCNC: 107 MMOL/L (ref 95–110)
CO2 SERPL-SCNC: 18 MMOL/L (ref 23–29)
CREAT SERPL-MCNC: 0.9 MG/DL (ref 0.5–1.4)
CRP SERPL-MCNC: 49.1 MG/L (ref 0–8.2)
DIFFERENTIAL METHOD: ABNORMAL
EOSINOPHIL # BLD AUTO: 0.1 K/UL (ref 0–0.5)
EOSINOPHIL NFR BLD: 1.5 % (ref 0–8)
ERYTHROCYTE [DISTWIDTH] IN BLOOD BY AUTOMATED COUNT: 15.1 % (ref 11.5–14.5)
EST. GFR  (AFRICAN AMERICAN): >60 ML/MIN/1.73 M^2
EST. GFR  (NON AFRICAN AMERICAN): >60 ML/MIN/1.73 M^2
FERRITIN SERPL-MCNC: 3793 NG/ML (ref 20–300)
GLUCOSE SERPL-MCNC: 91 MG/DL (ref 70–110)
HCT VFR BLD AUTO: 41.3 % (ref 37–48.5)
HGB BLD-MCNC: 13.9 G/DL (ref 12–16)
IMM GRANULOCYTES # BLD AUTO: 0.08 K/UL (ref 0–0.04)
IMM GRANULOCYTES NFR BLD AUTO: 1.5 % (ref 0–0.5)
LYMPHOCYTES # BLD AUTO: 1 K/UL (ref 1–4.8)
LYMPHOCYTES NFR BLD: 18.9 % (ref 18–48)
MCH RBC QN AUTO: 29.8 PG (ref 27–31)
MCHC RBC AUTO-ENTMCNC: 33.7 G/DL (ref 32–36)
MCV RBC AUTO: 89 FL (ref 82–98)
MONOCYTES # BLD AUTO: 0.3 K/UL (ref 0.3–1)
MONOCYTES NFR BLD: 5.5 % (ref 4–15)
NEUTROPHILS # BLD AUTO: 3.9 K/UL (ref 1.8–7.7)
NEUTROPHILS NFR BLD: 72.2 % (ref 38–73)
NRBC BLD-RTO: 0 /100 WBC
PLATELET # BLD AUTO: 216 K/UL (ref 150–450)
PMV BLD AUTO: 9.8 FL (ref 9.2–12.9)
POTASSIUM SERPL-SCNC: 3.6 MMOL/L (ref 3.5–5.1)
PROT SERPL-MCNC: 5.8 G/DL (ref 6–8.4)
RBC # BLD AUTO: 4.66 M/UL (ref 4–5.4)
SODIUM SERPL-SCNC: 135 MMOL/L (ref 136–145)
WBC # BLD AUTO: 5.41 K/UL (ref 3.9–12.7)

## 2021-07-27 PROCEDURE — 36415 COLL VENOUS BLD VENIPUNCTURE: CPT | Performed by: HOSPITALIST

## 2021-07-27 PROCEDURE — 86140 C-REACTIVE PROTEIN: CPT | Performed by: HOSPITALIST

## 2021-07-27 PROCEDURE — 25000003 PHARM REV CODE 250: Performed by: HOSPITALIST

## 2021-07-27 PROCEDURE — 20600001 HC STEP DOWN PRIVATE ROOM

## 2021-07-27 PROCEDURE — 25000003 PHARM REV CODE 250: Performed by: PHYSICIAN ASSISTANT

## 2021-07-27 PROCEDURE — 80053 COMPREHEN METABOLIC PANEL: CPT | Performed by: HOSPITALIST

## 2021-07-27 PROCEDURE — 82728 ASSAY OF FERRITIN: CPT | Performed by: HOSPITALIST

## 2021-07-27 PROCEDURE — 99233 PR SUBSEQUENT HOSPITAL CARE,LEVL III: ICD-10-PCS | Mod: 95,,, | Performed by: INTERNAL MEDICINE

## 2021-07-27 PROCEDURE — 25000003 PHARM REV CODE 250: Performed by: INTERNAL MEDICINE

## 2021-07-27 PROCEDURE — 85025 COMPLETE CBC W/AUTO DIFF WBC: CPT | Performed by: HOSPITALIST

## 2021-07-27 PROCEDURE — 99233 SBSQ HOSP IP/OBS HIGH 50: CPT | Mod: 95,,, | Performed by: INTERNAL MEDICINE

## 2021-07-27 RX ORDER — POLYETHYLENE GLYCOL 3350 17 G/17G
17 POWDER, FOR SOLUTION ORAL 2 TIMES DAILY
Status: DISCONTINUED | OUTPATIENT
Start: 2021-07-27 | End: 2021-07-30 | Stop reason: HOSPADM

## 2021-07-27 RX ORDER — MUPIROCIN 20 MG/G
OINTMENT TOPICAL 2 TIMES DAILY
Status: DISCONTINUED | OUTPATIENT
Start: 2021-07-27 | End: 2021-07-30 | Stop reason: HOSPADM

## 2021-07-27 RX ORDER — SENNOSIDES 8.6 MG/1
8.6 TABLET ORAL 2 TIMES DAILY
Status: DISCONTINUED | OUTPATIENT
Start: 2021-07-27 | End: 2021-07-30 | Stop reason: HOSPADM

## 2021-07-27 RX ADMIN — MUPIROCIN: 20 OINTMENT TOPICAL at 11:07

## 2021-07-27 RX ADMIN — POLYETHYLENE GLYCOL 3350 17 G: 17 POWDER, FOR SOLUTION ORAL at 11:07

## 2021-07-27 RX ADMIN — APIXABAN 10 MG: 5 TABLET, FILM COATED ORAL at 08:07

## 2021-07-27 RX ADMIN — Medication 500 MG: at 09:07

## 2021-07-27 RX ADMIN — Medication 500 MG: at 08:07

## 2021-07-27 RX ADMIN — CHOLECALCIFEROL (VITAMIN D3) 25 MCG (1,000 UNIT) TABLET 1000 UNITS: TABLET at 09:07

## 2021-07-27 RX ADMIN — THERA TABS 1 TABLET: TAB at 09:07

## 2021-07-27 RX ADMIN — APIXABAN 10 MG: 5 TABLET, FILM COATED ORAL at 09:07

## 2021-07-27 RX ADMIN — SENNOSIDES 8.6 MG: 8.6 TABLET, FILM COATED ORAL at 11:07

## 2021-07-28 PROBLEM — N17.9 AKI (ACUTE KIDNEY INJURY): Status: RESOLVED | Noted: 2021-07-21 | Resolved: 2021-07-28

## 2021-07-28 LAB
ALBUMIN SERPL BCP-MCNC: 1.7 G/DL (ref 3.5–5.2)
ALP SERPL-CCNC: 112 U/L (ref 55–135)
ALT SERPL W/O P-5'-P-CCNC: 23 U/L (ref 10–44)
ANION GAP SERPL CALC-SCNC: 11 MMOL/L (ref 8–16)
AST SERPL-CCNC: 64 U/L (ref 10–40)
BASOPHILS # BLD AUTO: 0.02 K/UL (ref 0–0.2)
BASOPHILS NFR BLD: 0.3 % (ref 0–1.9)
BILIRUB SERPL-MCNC: 0.6 MG/DL (ref 0.1–1)
BUN SERPL-MCNC: 17 MG/DL (ref 8–23)
CALCIUM SERPL-MCNC: 7.8 MG/DL (ref 8.7–10.5)
CHLORIDE SERPL-SCNC: 108 MMOL/L (ref 95–110)
CO2 SERPL-SCNC: 18 MMOL/L (ref 23–29)
CREAT SERPL-MCNC: 0.8 MG/DL (ref 0.5–1.4)
DIFFERENTIAL METHOD: ABNORMAL
EOSINOPHIL # BLD AUTO: 0.1 K/UL (ref 0–0.5)
EOSINOPHIL NFR BLD: 1 % (ref 0–8)
ERYTHROCYTE [DISTWIDTH] IN BLOOD BY AUTOMATED COUNT: 15.2 % (ref 11.5–14.5)
EST. GFR  (AFRICAN AMERICAN): >60 ML/MIN/1.73 M^2
EST. GFR  (NON AFRICAN AMERICAN): >60 ML/MIN/1.73 M^2
GLUCOSE SERPL-MCNC: 75 MG/DL (ref 70–110)
HCT VFR BLD AUTO: 40.2 % (ref 37–48.5)
HGB BLD-MCNC: 13.4 G/DL (ref 12–16)
IMM GRANULOCYTES # BLD AUTO: 0.09 K/UL (ref 0–0.04)
IMM GRANULOCYTES NFR BLD AUTO: 1.5 % (ref 0–0.5)
LYMPHOCYTES # BLD AUTO: 1.2 K/UL (ref 1–4.8)
LYMPHOCYTES NFR BLD: 19.1 % (ref 18–48)
MCH RBC QN AUTO: 29.6 PG (ref 27–31)
MCHC RBC AUTO-ENTMCNC: 33.3 G/DL (ref 32–36)
MCV RBC AUTO: 89 FL (ref 82–98)
MONOCYTES # BLD AUTO: 0.3 K/UL (ref 0.3–1)
MONOCYTES NFR BLD: 5.1 % (ref 4–15)
NEUTROPHILS # BLD AUTO: 4.4 K/UL (ref 1.8–7.7)
NEUTROPHILS NFR BLD: 73 % (ref 38–73)
NRBC BLD-RTO: 0 /100 WBC
PLATELET # BLD AUTO: 216 K/UL (ref 150–450)
PMV BLD AUTO: 9.8 FL (ref 9.2–12.9)
POTASSIUM SERPL-SCNC: 3.6 MMOL/L (ref 3.5–5.1)
PROT SERPL-MCNC: 5.6 G/DL (ref 6–8.4)
RBC # BLD AUTO: 4.53 M/UL (ref 4–5.4)
SODIUM SERPL-SCNC: 137 MMOL/L (ref 136–145)
WBC # BLD AUTO: 6.02 K/UL (ref 3.9–12.7)

## 2021-07-28 PROCEDURE — 25000003 PHARM REV CODE 250: Performed by: INTERNAL MEDICINE

## 2021-07-28 PROCEDURE — 36415 COLL VENOUS BLD VENIPUNCTURE: CPT | Performed by: HOSPITALIST

## 2021-07-28 PROCEDURE — 25000003 PHARM REV CODE 250: Performed by: HOSPITALIST

## 2021-07-28 PROCEDURE — 97530 THERAPEUTIC ACTIVITIES: CPT

## 2021-07-28 PROCEDURE — 85025 COMPLETE CBC W/AUTO DIFF WBC: CPT | Performed by: HOSPITALIST

## 2021-07-28 PROCEDURE — 97112 NEUROMUSCULAR REEDUCATION: CPT

## 2021-07-28 PROCEDURE — 80053 COMPREHEN METABOLIC PANEL: CPT | Performed by: HOSPITALIST

## 2021-07-28 PROCEDURE — 94761 N-INVAS EAR/PLS OXIMETRY MLT: CPT

## 2021-07-28 PROCEDURE — 99233 SBSQ HOSP IP/OBS HIGH 50: CPT | Mod: 95,,, | Performed by: INTERNAL MEDICINE

## 2021-07-28 PROCEDURE — 97535 SELF CARE MNGMENT TRAINING: CPT

## 2021-07-28 PROCEDURE — 99233 PR SUBSEQUENT HOSPITAL CARE,LEVL III: ICD-10-PCS | Mod: 95,,, | Performed by: INTERNAL MEDICINE

## 2021-07-28 PROCEDURE — 20600001 HC STEP DOWN PRIVATE ROOM

## 2021-07-28 PROCEDURE — 25000003 PHARM REV CODE 250: Performed by: PHYSICIAN ASSISTANT

## 2021-07-28 RX ORDER — DOCUSATE SODIUM 283 MG/5ML
1 LIQUID RECTAL DAILY PRN
Status: DISCONTINUED | OUTPATIENT
Start: 2021-07-28 | End: 2021-07-30 | Stop reason: HOSPADM

## 2021-07-28 RX ORDER — BISACODYL 10 MG
10 SUPPOSITORY, RECTAL RECTAL DAILY PRN
Status: DISCONTINUED | OUTPATIENT
Start: 2021-07-28 | End: 2021-07-30 | Stop reason: HOSPADM

## 2021-07-28 RX ADMIN — APIXABAN 10 MG: 5 TABLET, FILM COATED ORAL at 09:07

## 2021-07-28 RX ADMIN — Medication 500 MG: at 09:07

## 2021-07-28 RX ADMIN — MUPIROCIN: 20 OINTMENT TOPICAL at 09:07

## 2021-07-28 RX ADMIN — CHOLECALCIFEROL (VITAMIN D3) 25 MCG (1,000 UNIT) TABLET 1000 UNITS: TABLET at 09:07

## 2021-07-28 RX ADMIN — THERA TABS 1 TABLET: TAB at 09:07

## 2021-07-28 RX ADMIN — SENNOSIDES 8.6 MG: 8.6 TABLET, FILM COATED ORAL at 09:07

## 2021-07-28 RX ADMIN — POLYETHYLENE GLYCOL 3350 17 G: 17 POWDER, FOR SOLUTION ORAL at 09:07

## 2021-07-29 LAB
ALBUMIN SERPL BCP-MCNC: 1.7 G/DL (ref 3.5–5.2)
ALP SERPL-CCNC: 114 U/L (ref 55–135)
ALT SERPL W/O P-5'-P-CCNC: 25 U/L (ref 10–44)
ANION GAP SERPL CALC-SCNC: 13 MMOL/L (ref 8–16)
ANISOCYTOSIS BLD QL SMEAR: SLIGHT
AST SERPL-CCNC: 69 U/L (ref 10–40)
BASOPHILS # BLD AUTO: 0.04 K/UL (ref 0–0.2)
BASOPHILS NFR BLD: 0.5 % (ref 0–1.9)
BILIRUB SERPL-MCNC: 0.6 MG/DL (ref 0.1–1)
BNP SERPL-MCNC: 91 PG/ML (ref 0–99)
BUN SERPL-MCNC: 18 MG/DL (ref 8–23)
CALCIUM SERPL-MCNC: 7.9 MG/DL (ref 8.7–10.5)
CHLORIDE SERPL-SCNC: 111 MMOL/L (ref 95–110)
CO2 SERPL-SCNC: 15 MMOL/L (ref 23–29)
CREAT SERPL-MCNC: 0.8 MG/DL (ref 0.5–1.4)
CRP SERPL-MCNC: 69.5 MG/L (ref 0–8.2)
D DIMER PPP IA.FEU-MCNC: 1.22 MG/L FEU
DIFFERENTIAL METHOD: ABNORMAL
EOSINOPHIL # BLD AUTO: 0 K/UL (ref 0–0.5)
EOSINOPHIL NFR BLD: 0.4 % (ref 0–8)
ERYTHROCYTE [DISTWIDTH] IN BLOOD BY AUTOMATED COUNT: 15.2 % (ref 11.5–14.5)
ERYTHROCYTE [SEDIMENTATION RATE] IN BLOOD BY WESTERGREN METHOD: 29 MM/HR (ref 0–36)
EST. GFR  (AFRICAN AMERICAN): >60 ML/MIN/1.73 M^2
EST. GFR  (NON AFRICAN AMERICAN): >60 ML/MIN/1.73 M^2
FERRITIN SERPL-MCNC: 4333 NG/ML (ref 20–300)
GLUCOSE SERPL-MCNC: 77 MG/DL (ref 70–110)
HCT VFR BLD AUTO: 41.1 % (ref 37–48.5)
HGB BLD-MCNC: 14.1 G/DL (ref 12–16)
IMM GRANULOCYTES # BLD AUTO: 0.15 K/UL (ref 0–0.04)
IMM GRANULOCYTES NFR BLD AUTO: 2 % (ref 0–0.5)
LDH SERPL L TO P-CCNC: 368 U/L (ref 110–260)
LYMPHOCYTES # BLD AUTO: 1.4 K/UL (ref 1–4.8)
LYMPHOCYTES NFR BLD: 18 % (ref 18–48)
MCH RBC QN AUTO: 29.4 PG (ref 27–31)
MCHC RBC AUTO-ENTMCNC: 34.3 G/DL (ref 32–36)
MCV RBC AUTO: 86 FL (ref 82–98)
MONOCYTES # BLD AUTO: 0.3 K/UL (ref 0.3–1)
MONOCYTES NFR BLD: 4.1 % (ref 4–15)
NEUTROPHILS # BLD AUTO: 5.7 K/UL (ref 1.8–7.7)
NEUTROPHILS NFR BLD: 75 % (ref 38–73)
NRBC BLD-RTO: 0 /100 WBC
PLATELET # BLD AUTO: 163 K/UL (ref 150–450)
PLATELET BLD QL SMEAR: ABNORMAL
PMV BLD AUTO: 11 FL (ref 9.2–12.9)
POTASSIUM SERPL-SCNC: 3.8 MMOL/L (ref 3.5–5.1)
PROT SERPL-MCNC: 5.7 G/DL (ref 6–8.4)
RBC # BLD AUTO: 4.8 M/UL (ref 4–5.4)
SODIUM SERPL-SCNC: 139 MMOL/L (ref 136–145)
WBC # BLD AUTO: 7.57 K/UL (ref 3.9–12.7)
WBC TOXIC VACUOLES BLD QL SMEAR: PRESENT

## 2021-07-29 PROCEDURE — 25000003 PHARM REV CODE 250: Performed by: INTERNAL MEDICINE

## 2021-07-29 PROCEDURE — 86140 C-REACTIVE PROTEIN: CPT | Performed by: HOSPITALIST

## 2021-07-29 PROCEDURE — 94761 N-INVAS EAR/PLS OXIMETRY MLT: CPT

## 2021-07-29 PROCEDURE — 99231 SBSQ HOSP IP/OBS SF/LOW 25: CPT | Mod: ,,, | Performed by: INTERNAL MEDICINE

## 2021-07-29 PROCEDURE — 25000003 PHARM REV CODE 250: Performed by: HOSPITALIST

## 2021-07-29 PROCEDURE — 99231 PR SUBSEQUENT HOSPITAL CARE,LEVL I: ICD-10-PCS | Mod: ,,, | Performed by: INTERNAL MEDICINE

## 2021-07-29 PROCEDURE — 85025 COMPLETE CBC W/AUTO DIFF WBC: CPT | Performed by: HOSPITALIST

## 2021-07-29 PROCEDURE — 83880 ASSAY OF NATRIURETIC PEPTIDE: CPT | Performed by: INTERNAL MEDICINE

## 2021-07-29 PROCEDURE — 85379 FIBRIN DEGRADATION QUANT: CPT | Performed by: INTERNAL MEDICINE

## 2021-07-29 PROCEDURE — 93005 ELECTROCARDIOGRAM TRACING: CPT

## 2021-07-29 PROCEDURE — 93010 EKG 12-LEAD: ICD-10-PCS | Mod: ,,, | Performed by: INTERNAL MEDICINE

## 2021-07-29 PROCEDURE — 93010 ELECTROCARDIOGRAM REPORT: CPT | Mod: ,,, | Performed by: INTERNAL MEDICINE

## 2021-07-29 PROCEDURE — 25000003 PHARM REV CODE 250: Performed by: PHYSICIAN ASSISTANT

## 2021-07-29 PROCEDURE — 20600001 HC STEP DOWN PRIVATE ROOM

## 2021-07-29 PROCEDURE — 82728 ASSAY OF FERRITIN: CPT | Performed by: HOSPITALIST

## 2021-07-29 PROCEDURE — 63600175 PHARM REV CODE 636 W HCPCS: Performed by: INTERNAL MEDICINE

## 2021-07-29 PROCEDURE — 85652 RBC SED RATE AUTOMATED: CPT | Performed by: INTERNAL MEDICINE

## 2021-07-29 PROCEDURE — 83615 LACTATE (LD) (LDH) ENZYME: CPT | Performed by: INTERNAL MEDICINE

## 2021-07-29 PROCEDURE — 80053 COMPREHEN METABOLIC PANEL: CPT | Performed by: HOSPITALIST

## 2021-07-29 PROCEDURE — 36415 COLL VENOUS BLD VENIPUNCTURE: CPT | Performed by: INTERNAL MEDICINE

## 2021-07-29 RX ORDER — BENZONATATE 100 MG/1
100 CAPSULE ORAL 3 TIMES DAILY
Status: DISCONTINUED | OUTPATIENT
Start: 2021-07-29 | End: 2021-07-30 | Stop reason: HOSPADM

## 2021-07-29 RX ORDER — GUAIFENESIN/DEXTROMETHORPHAN 100-10MG/5
10 SYRUP ORAL EVERY 6 HOURS
Status: DISCONTINUED | OUTPATIENT
Start: 2021-07-29 | End: 2021-07-30 | Stop reason: HOSPADM

## 2021-07-29 RX ORDER — PROMETHAZINE HYDROCHLORIDE AND CODEINE PHOSPHATE 6.25; 1 MG/5ML; MG/5ML
5 SOLUTION ORAL EVERY 4 HOURS PRN
Status: DISCONTINUED | OUTPATIENT
Start: 2021-07-29 | End: 2021-07-30 | Stop reason: HOSPADM

## 2021-07-29 RX ADMIN — SENNOSIDES 8.6 MG: 8.6 TABLET, FILM COATED ORAL at 10:07

## 2021-07-29 RX ADMIN — MUPIROCIN: 20 OINTMENT TOPICAL at 09:07

## 2021-07-29 RX ADMIN — Medication 500 MG: at 09:07

## 2021-07-29 RX ADMIN — APIXABAN 5 MG: 5 TABLET, FILM COATED ORAL at 09:07

## 2021-07-29 RX ADMIN — GUAIFENESIN SYRUP AND DEXTROMETHORPHAN 10 ML: 100; 10 SYRUP ORAL at 04:07

## 2021-07-29 RX ADMIN — THERA TABS 1 TABLET: TAB at 10:07

## 2021-07-29 RX ADMIN — MUPIROCIN: 20 OINTMENT TOPICAL at 10:07

## 2021-07-29 RX ADMIN — Medication 500 MG: at 10:07

## 2021-07-29 RX ADMIN — BENZONATATE 100 MG: 100 CAPSULE ORAL at 09:07

## 2021-07-29 RX ADMIN — POLYETHYLENE GLYCOL 3350 17 G: 17 POWDER, FOR SOLUTION ORAL at 09:07

## 2021-07-29 RX ADMIN — BENZONATATE 100 MG: 100 CAPSULE ORAL at 04:07

## 2021-07-29 RX ADMIN — SENNOSIDES 8.6 MG: 8.6 TABLET, FILM COATED ORAL at 09:07

## 2021-07-29 RX ADMIN — APIXABAN 10 MG: 5 TABLET, FILM COATED ORAL at 10:07

## 2021-07-29 RX ADMIN — DEXAMETHASONE 6 MG: 4 TABLET ORAL at 04:07

## 2021-07-29 RX ADMIN — CHOLECALCIFEROL (VITAMIN D3) 25 MCG (1,000 UNIT) TABLET 1000 UNITS: TABLET at 10:07

## 2021-07-30 VITALS
OXYGEN SATURATION: 93 % | DIASTOLIC BLOOD PRESSURE: 80 MMHG | HEIGHT: 65 IN | BODY MASS INDEX: 34.01 KG/M2 | RESPIRATION RATE: 18 BRPM | SYSTOLIC BLOOD PRESSURE: 122 MMHG | WEIGHT: 204.13 LBS | HEART RATE: 96 BPM | TEMPERATURE: 97 F

## 2021-07-30 LAB
ALBUMIN SERPL BCP-MCNC: 1.7 G/DL (ref 3.5–5.2)
ALP SERPL-CCNC: 122 U/L (ref 55–135)
ALT SERPL W/O P-5'-P-CCNC: 25 U/L (ref 10–44)
ANION GAP SERPL CALC-SCNC: 7 MMOL/L (ref 8–16)
AST SERPL-CCNC: 64 U/L (ref 10–40)
BASOPHILS # BLD AUTO: 0.02 K/UL (ref 0–0.2)
BASOPHILS NFR BLD: 0.3 % (ref 0–1.9)
BILIRUB SERPL-MCNC: 0.6 MG/DL (ref 0.1–1)
BUN SERPL-MCNC: 18 MG/DL (ref 8–23)
CALCIUM SERPL-MCNC: 7.8 MG/DL (ref 8.7–10.5)
CHLORIDE SERPL-SCNC: 107 MMOL/L (ref 95–110)
CO2 SERPL-SCNC: 23 MMOL/L (ref 23–29)
CREAT SERPL-MCNC: 0.8 MG/DL (ref 0.5–1.4)
DIFFERENTIAL METHOD: ABNORMAL
EOSINOPHIL # BLD AUTO: 0 K/UL (ref 0–0.5)
EOSINOPHIL NFR BLD: 0 % (ref 0–8)
ERYTHROCYTE [DISTWIDTH] IN BLOOD BY AUTOMATED COUNT: 15.1 % (ref 11.5–14.5)
EST. GFR  (AFRICAN AMERICAN): >60 ML/MIN/1.73 M^2
EST. GFR  (NON AFRICAN AMERICAN): >60 ML/MIN/1.73 M^2
GLUCOSE SERPL-MCNC: 118 MG/DL (ref 70–110)
HCT VFR BLD AUTO: 40.9 % (ref 37–48.5)
HGB BLD-MCNC: 13.5 G/DL (ref 12–16)
IMM GRANULOCYTES # BLD AUTO: 0.12 K/UL (ref 0–0.04)
IMM GRANULOCYTES NFR BLD AUTO: 1.9 % (ref 0–0.5)
LYMPHOCYTES # BLD AUTO: 0.7 K/UL (ref 1–4.8)
LYMPHOCYTES NFR BLD: 11.9 % (ref 18–48)
MCH RBC QN AUTO: 28.8 PG (ref 27–31)
MCHC RBC AUTO-ENTMCNC: 33 G/DL (ref 32–36)
MCV RBC AUTO: 87 FL (ref 82–98)
MONOCYTES # BLD AUTO: 0.2 K/UL (ref 0.3–1)
MONOCYTES NFR BLD: 3.9 % (ref 4–15)
NEUTROPHILS # BLD AUTO: 5.1 K/UL (ref 1.8–7.7)
NEUTROPHILS NFR BLD: 82 % (ref 38–73)
NRBC BLD-RTO: 0 /100 WBC
PLATELET # BLD AUTO: 234 K/UL (ref 150–450)
PMV BLD AUTO: 10 FL (ref 9.2–12.9)
POTASSIUM SERPL-SCNC: 3.8 MMOL/L (ref 3.5–5.1)
PROT SERPL-MCNC: 6 G/DL (ref 6–8.4)
RBC # BLD AUTO: 4.69 M/UL (ref 4–5.4)
SODIUM SERPL-SCNC: 137 MMOL/L (ref 136–145)
WBC # BLD AUTO: 6.23 K/UL (ref 3.9–12.7)

## 2021-07-30 PROCEDURE — 1111F DSCHRG MED/CURRENT MED MERGE: CPT | Mod: CPTII,,, | Performed by: INTERNAL MEDICINE

## 2021-07-30 PROCEDURE — 25000003 PHARM REV CODE 250: Performed by: PHYSICIAN ASSISTANT

## 2021-07-30 PROCEDURE — 25000003 PHARM REV CODE 250: Performed by: INTERNAL MEDICINE

## 2021-07-30 PROCEDURE — 63600175 PHARM REV CODE 636 W HCPCS: Performed by: INTERNAL MEDICINE

## 2021-07-30 PROCEDURE — 97535 SELF CARE MNGMENT TRAINING: CPT

## 2021-07-30 PROCEDURE — 1111F PR DISCHARGE MEDS RECONCILED W/ CURRENT OUTPATIENT MED LIST: ICD-10-PCS | Mod: CPTII,,, | Performed by: INTERNAL MEDICINE

## 2021-07-30 PROCEDURE — 25000003 PHARM REV CODE 250: Performed by: HOSPITALIST

## 2021-07-30 PROCEDURE — 99239 HOSP IP/OBS DSCHRG MGMT >30: CPT | Mod: ,,, | Performed by: INTERNAL MEDICINE

## 2021-07-30 PROCEDURE — 80053 COMPREHEN METABOLIC PANEL: CPT | Performed by: HOSPITALIST

## 2021-07-30 PROCEDURE — 85025 COMPLETE CBC W/AUTO DIFF WBC: CPT | Performed by: HOSPITALIST

## 2021-07-30 PROCEDURE — 94761 N-INVAS EAR/PLS OXIMETRY MLT: CPT

## 2021-07-30 PROCEDURE — 99239 PR HOSPITAL DISCHARGE DAY,>30 MIN: ICD-10-PCS | Mod: ,,, | Performed by: INTERNAL MEDICINE

## 2021-07-30 PROCEDURE — 97530 THERAPEUTIC ACTIVITIES: CPT

## 2021-07-30 RX ORDER — BENZONATATE 100 MG/1
100 CAPSULE ORAL 3 TIMES DAILY
Qty: 30 CAPSULE | Refills: 0
Start: 2021-07-30 | End: 2021-08-09

## 2021-07-30 RX ORDER — GUAIFENESIN/DEXTROMETHORPHAN 100-10MG/5
10 SYRUP ORAL EVERY 6 HOURS
Refills: 0
Start: 2021-07-30 | End: 2021-08-09

## 2021-07-30 RX ORDER — IPRATROPIUM BROMIDE AND ALBUTEROL SULFATE 2.5; .5 MG/3ML; MG/3ML
3 SOLUTION RESPIRATORY (INHALATION) EVERY 6 HOURS
Status: DISCONTINUED | OUTPATIENT
Start: 2021-07-30 | End: 2021-07-30 | Stop reason: HOSPADM

## 2021-07-30 RX ORDER — FUROSEMIDE 10 MG/ML
40 INJECTION INTRAMUSCULAR; INTRAVENOUS ONCE
Status: DISCONTINUED | OUTPATIENT
Start: 2021-07-30 | End: 2021-07-30

## 2021-07-30 RX ORDER — FUROSEMIDE 40 MG/1
40 TABLET ORAL ONCE
Status: COMPLETED | OUTPATIENT
Start: 2021-07-30 | End: 2021-07-30

## 2021-07-30 RX ORDER — BENZONATATE 100 MG/1
100 CAPSULE ORAL 3 TIMES DAILY
Qty: 30 CAPSULE | Refills: 0 | Status: SHIPPED | OUTPATIENT
Start: 2021-07-30 | End: 2021-07-30

## 2021-07-30 RX ORDER — CHOLECALCIFEROL (VITAMIN D3) 25 MCG
1000 TABLET ORAL DAILY
Qty: 30 TABLET | Refills: 3
Start: 2021-07-31

## 2021-07-30 RX ADMIN — BENZONATATE 100 MG: 100 CAPSULE ORAL at 09:07

## 2021-07-30 RX ADMIN — APIXABAN 5 MG: 5 TABLET, FILM COATED ORAL at 09:07

## 2021-07-30 RX ADMIN — THERA TABS 1 TABLET: TAB at 09:07

## 2021-07-30 RX ADMIN — DEXAMETHASONE 6 MG: 4 TABLET ORAL at 09:07

## 2021-07-30 RX ADMIN — FUROSEMIDE 40 MG: 40 TABLET ORAL at 12:07

## 2021-07-30 RX ADMIN — GUAIFENESIN SYRUP AND DEXTROMETHORPHAN 10 ML: 100; 10 SYRUP ORAL at 12:07

## 2021-07-30 RX ADMIN — MUPIROCIN: 20 OINTMENT TOPICAL at 09:07

## 2021-07-30 RX ADMIN — Medication 500 MG: at 12:07

## 2021-07-30 RX ADMIN — SENNOSIDES 8.6 MG: 8.6 TABLET, FILM COATED ORAL at 09:07

## 2021-07-30 RX ADMIN — CHOLECALCIFEROL (VITAMIN D3) 25 MCG (1,000 UNIT) TABLET 1000 UNITS: TABLET at 09:07

## 2021-08-02 ENCOUNTER — TELEPHONE (OUTPATIENT)
Dept: INTERNAL MEDICINE | Facility: CLINIC | Age: 81
End: 2021-08-02

## 2021-08-05 ENCOUNTER — HOSPITAL ENCOUNTER (EMERGENCY)
Facility: HOSPITAL | Age: 81
Discharge: HOME | End: 2021-08-05
Attending: EMERGENCY MEDICINE
Payer: MEDICARE

## 2021-08-05 VITALS
RESPIRATION RATE: 18 BRPM | WEIGHT: 205 LBS | DIASTOLIC BLOOD PRESSURE: 73 MMHG | BODY MASS INDEX: 34.11 KG/M2 | TEMPERATURE: 99 F | OXYGEN SATURATION: 97 % | HEART RATE: 64 BPM | SYSTOLIC BLOOD PRESSURE: 147 MMHG

## 2021-08-05 DIAGNOSIS — U07.1 COVID-19: ICD-10-CM

## 2021-08-05 DIAGNOSIS — R41.82 ALTERED MENTAL STATUS: Primary | ICD-10-CM

## 2021-08-05 LAB
ALBUMIN SERPL BCP-MCNC: 2.2 G/DL (ref 3.5–5.2)
ALP SERPL-CCNC: 151 U/L (ref 55–135)
ALT SERPL W/O P-5'-P-CCNC: 49 U/L (ref 10–44)
ANION GAP SERPL CALC-SCNC: 14 MMOL/L (ref 8–16)
AST SERPL-CCNC: 46 U/L (ref 10–40)
BACTERIA #/AREA URNS HPF: NORMAL /HPF
BASOPHILS # BLD AUTO: 0.05 K/UL (ref 0–0.2)
BASOPHILS NFR BLD: 0.5 % (ref 0–1.9)
BILIRUB SERPL-MCNC: 1.3 MG/DL (ref 0.1–1)
BILIRUB UR QL STRIP: ABNORMAL
BUN SERPL-MCNC: 24 MG/DL (ref 8–23)
CALCIUM SERPL-MCNC: 8.3 MG/DL (ref 8.7–10.5)
CHLORIDE SERPL-SCNC: 114 MMOL/L (ref 95–110)
CLARITY UR: CLEAR
CO2 SERPL-SCNC: 21 MMOL/L (ref 23–29)
COLOR UR: YELLOW
CREAT SERPL-MCNC: 0.9 MG/DL (ref 0.5–1.4)
DIFFERENTIAL METHOD: ABNORMAL
EOSINOPHIL # BLD AUTO: 0.1 K/UL (ref 0–0.5)
EOSINOPHIL NFR BLD: 1 % (ref 0–8)
ERYTHROCYTE [DISTWIDTH] IN BLOOD BY AUTOMATED COUNT: 15.4 % (ref 11.5–14.5)
EST. GFR  (AFRICAN AMERICAN): >60 ML/MIN/1.73 M^2
EST. GFR  (NON AFRICAN AMERICAN): >60 ML/MIN/1.73 M^2
GLUCOSE SERPL-MCNC: 86 MG/DL (ref 70–110)
GLUCOSE UR QL STRIP: NEGATIVE
HCT VFR BLD AUTO: 43.1 % (ref 37–48.5)
HGB BLD-MCNC: 13.7 G/DL (ref 12–16)
HGB UR QL STRIP: NEGATIVE
IMM GRANULOCYTES # BLD AUTO: 0.14 K/UL (ref 0–0.04)
IMM GRANULOCYTES NFR BLD AUTO: 1.5 % (ref 0–0.5)
KETONES UR QL STRIP: NEGATIVE
LEUKOCYTE ESTERASE UR QL STRIP: ABNORMAL
LIPASE SERPL-CCNC: 17 U/L (ref 4–60)
LYMPHOCYTES # BLD AUTO: 1.2 K/UL (ref 1–4.8)
LYMPHOCYTES NFR BLD: 12.9 % (ref 18–48)
MCH RBC QN AUTO: 29 PG (ref 27–31)
MCHC RBC AUTO-ENTMCNC: 31.8 G/DL (ref 32–36)
MCV RBC AUTO: 91 FL (ref 82–98)
MICROSCOPIC COMMENT: NORMAL
MONOCYTES # BLD AUTO: 0.6 K/UL (ref 0.3–1)
MONOCYTES NFR BLD: 6.9 % (ref 4–15)
NEUTROPHILS # BLD AUTO: 7.2 K/UL (ref 1.8–7.7)
NEUTROPHILS NFR BLD: 77.2 % (ref 38–73)
NITRITE UR QL STRIP: POSITIVE
NRBC BLD-RTO: 0 /100 WBC
PH UR STRIP: 6 [PH] (ref 5–8)
PLATELET # BLD AUTO: 266 K/UL (ref 150–450)
PMV BLD AUTO: 9.5 FL (ref 9.2–12.9)
POTASSIUM SERPL-SCNC: 3.6 MMOL/L (ref 3.5–5.1)
PROT SERPL-MCNC: 6.7 G/DL (ref 6–8.4)
PROT UR QL STRIP: ABNORMAL
RBC # BLD AUTO: 4.73 M/UL (ref 4–5.4)
SODIUM SERPL-SCNC: 149 MMOL/L (ref 136–145)
SP GR UR STRIP: 1.02 (ref 1–1.03)
TROPONIN I SERPL DL<=0.01 NG/ML-MCNC: 0.02 NG/ML (ref 0–0.03)
URN SPEC COLLECT METH UR: ABNORMAL
UROBILINOGEN UR STRIP-ACNC: ABNORMAL EU/DL
WBC # BLD AUTO: 9.31 K/UL (ref 3.9–12.7)
WBC #/AREA URNS HPF: 4 /HPF (ref 0–5)

## 2021-08-05 PROCEDURE — 93010 EKG 12-LEAD: ICD-10-PCS | Mod: ,,, | Performed by: INTERNAL MEDICINE

## 2021-08-05 PROCEDURE — 85025 COMPLETE CBC W/AUTO DIFF WBC: CPT | Performed by: EMERGENCY MEDICINE

## 2021-08-05 PROCEDURE — 99291 CRITICAL CARE FIRST HOUR: CPT | Mod: 25

## 2021-08-05 PROCEDURE — 81000 URINALYSIS NONAUTO W/SCOPE: CPT | Performed by: EMERGENCY MEDICINE

## 2021-08-05 PROCEDURE — 25000003 PHARM REV CODE 250: Performed by: EMERGENCY MEDICINE

## 2021-08-05 PROCEDURE — 93005 ELECTROCARDIOGRAM TRACING: CPT

## 2021-08-05 PROCEDURE — 83690 ASSAY OF LIPASE: CPT | Performed by: EMERGENCY MEDICINE

## 2021-08-05 PROCEDURE — 96361 HYDRATE IV INFUSION ADD-ON: CPT

## 2021-08-05 PROCEDURE — 84484 ASSAY OF TROPONIN QUANT: CPT | Performed by: EMERGENCY MEDICINE

## 2021-08-05 PROCEDURE — 93010 ELECTROCARDIOGRAM REPORT: CPT | Mod: ,,, | Performed by: INTERNAL MEDICINE

## 2021-08-05 PROCEDURE — 96374 THER/PROPH/DIAG INJ IV PUSH: CPT

## 2021-08-05 PROCEDURE — 80053 COMPREHEN METABOLIC PANEL: CPT | Performed by: EMERGENCY MEDICINE

## 2021-08-05 PROCEDURE — 63600175 PHARM REV CODE 636 W HCPCS: Performed by: FAMILY MEDICINE

## 2021-08-05 RX ORDER — DEXAMETHASONE SODIUM PHOSPHATE 4 MG/ML
8 INJECTION, SOLUTION INTRA-ARTICULAR; INTRALESIONAL; INTRAMUSCULAR; INTRAVENOUS; SOFT TISSUE
Status: COMPLETED | OUTPATIENT
Start: 2021-08-05 | End: 2021-08-05

## 2021-08-05 RX ADMIN — SODIUM CHLORIDE 1000 ML: 0.9 INJECTION, SOLUTION INTRAVENOUS at 02:08

## 2021-08-05 RX ADMIN — DEXAMETHASONE SODIUM PHOSPHATE 8 MG: 4 INJECTION, SOLUTION INTRA-ARTICULAR; INTRALESIONAL; INTRAMUSCULAR; INTRAVENOUS; SOFT TISSUE at 05:08

## 2021-08-24 ENCOUNTER — PATIENT MESSAGE (OUTPATIENT)
Dept: ADMINISTRATIVE | Facility: HOSPITAL | Age: 81
End: 2021-08-24

## 2021-08-24 ENCOUNTER — PATIENT OUTREACH (OUTPATIENT)
Dept: ADMINISTRATIVE | Facility: HOSPITAL | Age: 81
End: 2021-08-24

## 2021-08-27 ENCOUNTER — PATIENT MESSAGE (OUTPATIENT)
Dept: ADMINISTRATIVE | Facility: HOSPITAL | Age: 81
End: 2021-08-27

## 2021-08-27 ENCOUNTER — PATIENT OUTREACH (OUTPATIENT)
Dept: ADMINISTRATIVE | Facility: HOSPITAL | Age: 81
End: 2021-08-27

## 2021-10-04 ENCOUNTER — PATIENT MESSAGE (OUTPATIENT)
Dept: ADMINISTRATIVE | Facility: HOSPITAL | Age: 81
End: 2021-10-04

## 2021-11-18 ENCOUNTER — TELEPHONE (OUTPATIENT)
Dept: INTERNAL MEDICINE | Facility: CLINIC | Age: 81
End: 2021-11-18
Payer: MEDICARE

## 2021-11-18 ENCOUNTER — NURSE TRIAGE (OUTPATIENT)
Dept: ADMINISTRATIVE | Facility: CLINIC | Age: 81
End: 2021-11-18
Payer: MEDICARE

## 2021-11-18 ENCOUNTER — HOSPITAL ENCOUNTER (INPATIENT)
Facility: HOSPITAL | Age: 81
LOS: 6 days | Discharge: NURSING, REHAB OR LONG-TERM CARE | DRG: 378 | End: 2021-11-29
Attending: EMERGENCY MEDICINE | Admitting: STUDENT IN AN ORGANIZED HEALTH CARE EDUCATION/TRAINING PROGRAM
Payer: MEDICARE

## 2021-11-18 DIAGNOSIS — K62.5 RECTAL BLEEDING: Primary | ICD-10-CM

## 2021-11-18 DIAGNOSIS — I82.523: ICD-10-CM

## 2021-11-18 DIAGNOSIS — I73.9 SEVERE PERIPHERAL ARTERIAL DISEASE: ICD-10-CM

## 2021-11-18 DIAGNOSIS — I73.9 PERIPHERAL VASCULAR DISEASE: ICD-10-CM

## 2021-11-18 DIAGNOSIS — R32 BOWEL AND BLADDER INCONTINENCE: ICD-10-CM

## 2021-11-18 DIAGNOSIS — T50.905A HEMATOCHEZIA DUE TO MEDICATION: ICD-10-CM

## 2021-11-18 DIAGNOSIS — Z79.01 ANTICOAGULATED: ICD-10-CM

## 2021-11-18 DIAGNOSIS — K92.1 HEMATOCHEZIA DUE TO MEDICATION: ICD-10-CM

## 2021-11-18 DIAGNOSIS — R15.9 BOWEL AND BLADDER INCONTINENCE: ICD-10-CM

## 2021-11-18 DIAGNOSIS — D62 ACUTE BLOOD LOSS ANEMIA: ICD-10-CM

## 2021-11-18 DIAGNOSIS — K57.90 DIVERTICULOSIS: ICD-10-CM

## 2021-11-18 DIAGNOSIS — R29.898 WEAKNESS OF BOTH LOWER EXTREMITIES: ICD-10-CM

## 2021-11-18 LAB
ABO + RH BLD: NORMAL
ALBUMIN SERPL BCP-MCNC: 2.2 G/DL (ref 3.5–5.2)
ALP SERPL-CCNC: 81 U/L (ref 55–135)
ALT SERPL W/O P-5'-P-CCNC: 10 U/L (ref 10–44)
ANION GAP SERPL CALC-SCNC: 8 MMOL/L (ref 8–16)
AST SERPL-CCNC: 15 U/L (ref 10–40)
BASOPHILS # BLD AUTO: 0.04 K/UL (ref 0–0.2)
BASOPHILS # BLD AUTO: 0.04 K/UL (ref 0–0.2)
BASOPHILS NFR BLD: 0.4 % (ref 0–1.9)
BASOPHILS NFR BLD: 0.5 % (ref 0–1.9)
BILIRUB SERPL-MCNC: 1.1 MG/DL (ref 0.1–1)
BLD GP AB SCN CELLS X3 SERPL QL: NORMAL
BUN SERPL-MCNC: 10 MG/DL (ref 6–30)
BUN SERPL-MCNC: 10 MG/DL (ref 8–23)
CALCIUM SERPL-MCNC: 8.2 MG/DL (ref 8.7–10.5)
CHLORIDE SERPL-SCNC: 106 MMOL/L (ref 95–110)
CHLORIDE SERPL-SCNC: 108 MMOL/L (ref 95–110)
CO2 SERPL-SCNC: 22 MMOL/L (ref 23–29)
CREAT SERPL-MCNC: 0.8 MG/DL (ref 0.5–1.4)
CREAT SERPL-MCNC: 0.9 MG/DL (ref 0.5–1.4)
DIFFERENTIAL METHOD: ABNORMAL
DIFFERENTIAL METHOD: ABNORMAL
EOSINOPHIL # BLD AUTO: 0.1 K/UL (ref 0–0.5)
EOSINOPHIL # BLD AUTO: 0.1 K/UL (ref 0–0.5)
EOSINOPHIL NFR BLD: 1 % (ref 0–8)
EOSINOPHIL NFR BLD: 1.2 % (ref 0–8)
ERYTHROCYTE [DISTWIDTH] IN BLOOD BY AUTOMATED COUNT: 14.6 % (ref 11.5–14.5)
ERYTHROCYTE [DISTWIDTH] IN BLOOD BY AUTOMATED COUNT: 14.7 % (ref 11.5–14.5)
EST. GFR  (AFRICAN AMERICAN): >60 ML/MIN/1.73 M^2
EST. GFR  (NON AFRICAN AMERICAN): >60 ML/MIN/1.73 M^2
GLUCOSE SERPL-MCNC: 111 MG/DL (ref 70–110)
GLUCOSE SERPL-MCNC: 96 MG/DL (ref 70–110)
HCT VFR BLD AUTO: 41.6 % (ref 37–48.5)
HCT VFR BLD AUTO: 41.8 % (ref 37–48.5)
HCT VFR BLD CALC: 42 %PCV (ref 36–54)
HGB BLD-MCNC: 13.6 G/DL (ref 12–16)
HGB BLD-MCNC: 13.9 G/DL (ref 12–16)
IMM GRANULOCYTES # BLD AUTO: 0.03 K/UL (ref 0–0.04)
IMM GRANULOCYTES # BLD AUTO: 0.04 K/UL (ref 0–0.04)
IMM GRANULOCYTES NFR BLD AUTO: 0.4 % (ref 0–0.5)
IMM GRANULOCYTES NFR BLD AUTO: 0.4 % (ref 0–0.5)
LACTATE SERPL-SCNC: 2.7 MMOL/L (ref 0.5–2.2)
LYMPHOCYTES # BLD AUTO: 1.6 K/UL (ref 1–4.8)
LYMPHOCYTES # BLD AUTO: 3 K/UL (ref 1–4.8)
LYMPHOCYTES NFR BLD: 19.8 % (ref 18–48)
LYMPHOCYTES NFR BLD: 32.4 % (ref 18–48)
MCH RBC QN AUTO: 29.7 PG (ref 27–31)
MCH RBC QN AUTO: 30 PG (ref 27–31)
MCHC RBC AUTO-ENTMCNC: 32.7 G/DL (ref 32–36)
MCHC RBC AUTO-ENTMCNC: 33.3 G/DL (ref 32–36)
MCV RBC AUTO: 90 FL (ref 82–98)
MCV RBC AUTO: 91 FL (ref 82–98)
MONOCYTES # BLD AUTO: 0.7 K/UL (ref 0.3–1)
MONOCYTES # BLD AUTO: 0.7 K/UL (ref 0.3–1)
MONOCYTES NFR BLD: 7.8 % (ref 4–15)
MONOCYTES NFR BLD: 8.7 % (ref 4–15)
NEUTROPHILS # BLD AUTO: 5.3 K/UL (ref 1.8–7.7)
NEUTROPHILS # BLD AUTO: 5.6 K/UL (ref 1.8–7.7)
NEUTROPHILS NFR BLD: 58 % (ref 38–73)
NEUTROPHILS NFR BLD: 69.4 % (ref 38–73)
NRBC BLD-RTO: 0 /100 WBC
NRBC BLD-RTO: 0 /100 WBC
PLATELET # BLD AUTO: 221 K/UL (ref 150–450)
PLATELET # BLD AUTO: 228 K/UL (ref 150–450)
PMV BLD AUTO: 10 FL (ref 9.2–12.9)
PMV BLD AUTO: 9.8 FL (ref 9.2–12.9)
POC IONIZED CALCIUM: 1.11 MMOL/L (ref 1.06–1.42)
POC TCO2 (MEASURED): 23 MMOL/L (ref 23–29)
POTASSIUM BLD-SCNC: 3.5 MMOL/L (ref 3.5–5.1)
POTASSIUM SERPL-SCNC: 3.8 MMOL/L (ref 3.5–5.1)
PROT SERPL-MCNC: 5.7 G/DL (ref 6–8.4)
RBC # BLD AUTO: 4.58 M/UL (ref 4–5.4)
RBC # BLD AUTO: 4.63 M/UL (ref 4–5.4)
SAMPLE: ABNORMAL
SODIUM BLD-SCNC: 141 MMOL/L (ref 136–145)
SODIUM SERPL-SCNC: 138 MMOL/L (ref 136–145)
WBC # BLD AUTO: 8.13 K/UL (ref 3.9–12.7)
WBC # BLD AUTO: 9.21 K/UL (ref 3.9–12.7)

## 2021-11-18 PROCEDURE — 99220 PR INITIAL OBSERVATION CARE,LEVL III: ICD-10-PCS | Mod: HCNC,,, | Performed by: STUDENT IN AN ORGANIZED HEALTH CARE EDUCATION/TRAINING PROGRAM

## 2021-11-18 PROCEDURE — G0378 HOSPITAL OBSERVATION PER HR: HCPCS | Mod: HCNC

## 2021-11-18 PROCEDURE — 86900 BLOOD TYPING SEROLOGIC ABO: CPT | Mod: HCNC | Performed by: PHYSICIAN ASSISTANT

## 2021-11-18 PROCEDURE — C9113 INJ PANTOPRAZOLE SODIUM, VIA: HCPCS | Mod: HCNC | Performed by: STUDENT IN AN ORGANIZED HEALTH CARE EDUCATION/TRAINING PROGRAM

## 2021-11-18 PROCEDURE — 1158F PR ADVANCE CARE PLANNING DISCUSS DOCUMENTED IN MEDICAL RECORD: ICD-10-PCS | Mod: HCNC,CPTII,, | Performed by: STUDENT IN AN ORGANIZED HEALTH CARE EDUCATION/TRAINING PROGRAM

## 2021-11-18 PROCEDURE — 99285 PR EMERGENCY DEPT VISIT,LEVEL V: ICD-10-PCS | Mod: HCNC,,, | Performed by: PHYSICIAN ASSISTANT

## 2021-11-18 PROCEDURE — 99285 EMERGENCY DEPT VISIT HI MDM: CPT | Mod: HCNC,,, | Performed by: PHYSICIAN ASSISTANT

## 2021-11-18 PROCEDURE — 85025 COMPLETE CBC W/AUTO DIFF WBC: CPT | Mod: 91,HCNC | Performed by: STUDENT IN AN ORGANIZED HEALTH CARE EDUCATION/TRAINING PROGRAM

## 2021-11-18 PROCEDURE — 25000003 PHARM REV CODE 250: Mod: HCNC | Performed by: STUDENT IN AN ORGANIZED HEALTH CARE EDUCATION/TRAINING PROGRAM

## 2021-11-18 PROCEDURE — 80053 COMPREHEN METABOLIC PANEL: CPT | Mod: HCNC | Performed by: PHYSICIAN ASSISTANT

## 2021-11-18 PROCEDURE — 63600175 PHARM REV CODE 636 W HCPCS: Mod: HCNC | Performed by: STUDENT IN AN ORGANIZED HEALTH CARE EDUCATION/TRAINING PROGRAM

## 2021-11-18 PROCEDURE — 99220 PR INITIAL OBSERVATION CARE,LEVL III: CPT | Mod: HCNC,,, | Performed by: STUDENT IN AN ORGANIZED HEALTH CARE EDUCATION/TRAINING PROGRAM

## 2021-11-18 PROCEDURE — 83605 ASSAY OF LACTIC ACID: CPT | Mod: HCNC | Performed by: PHYSICIAN ASSISTANT

## 2021-11-18 PROCEDURE — 85025 COMPLETE CBC W/AUTO DIFF WBC: CPT | Mod: HCNC | Performed by: PHYSICIAN ASSISTANT

## 2021-11-18 PROCEDURE — 1158F ADVNC CARE PLAN TLK DOCD: CPT | Mod: HCNC,CPTII,, | Performed by: STUDENT IN AN ORGANIZED HEALTH CARE EDUCATION/TRAINING PROGRAM

## 2021-11-18 PROCEDURE — 25000003 PHARM REV CODE 250: Mod: HCNC | Performed by: PHYSICIAN ASSISTANT

## 2021-11-18 PROCEDURE — 80047 BASIC METABLC PNL IONIZED CA: CPT | Mod: HCNC

## 2021-11-18 PROCEDURE — 99285 EMERGENCY DEPT VISIT HI MDM: CPT | Mod: 25,HCNC

## 2021-11-18 RX ORDER — SODIUM CHLORIDE 0.9 % (FLUSH) 0.9 %
10 SYRINGE (ML) INJECTION
Status: DISCONTINUED | OUTPATIENT
Start: 2021-11-18 | End: 2021-11-30 | Stop reason: HOSPADM

## 2021-11-18 RX ORDER — TALC
6 POWDER (GRAM) TOPICAL NIGHTLY PRN
Status: DISCONTINUED | OUTPATIENT
Start: 2021-11-18 | End: 2021-11-30 | Stop reason: HOSPADM

## 2021-11-18 RX ORDER — MICONAZOLE NITRATE 2 %
POWDER (GRAM) TOPICAL 2 TIMES DAILY
Status: DISCONTINUED | OUTPATIENT
Start: 2021-11-18 | End: 2021-11-30 | Stop reason: HOSPADM

## 2021-11-18 RX ORDER — PANTOPRAZOLE SODIUM 40 MG/10ML
40 INJECTION, POWDER, LYOPHILIZED, FOR SOLUTION INTRAVENOUS 2 TIMES DAILY
Status: DISCONTINUED | OUTPATIENT
Start: 2021-11-18 | End: 2021-11-30 | Stop reason: HOSPADM

## 2021-11-18 RX ADMIN — SODIUM CHLORIDE 1000 ML: 0.9 INJECTION, SOLUTION INTRAVENOUS at 04:11

## 2021-11-18 RX ADMIN — PANTOPRAZOLE SODIUM 40 MG: 40 INJECTION, POWDER, FOR SOLUTION INTRAVENOUS at 10:11

## 2021-11-18 RX ADMIN — MICONAZOLE NITRATE: 20 POWDER TOPICAL at 11:11

## 2021-11-19 PROBLEM — I74.5 EMBOLISM AND THROMBOSIS OF ILIAC ARTERY: Status: ACTIVE | Noted: 2021-11-19

## 2021-11-19 PROBLEM — I82.523: Status: ACTIVE | Noted: 2021-11-19

## 2021-11-19 LAB
ANION GAP SERPL CALC-SCNC: 6 MMOL/L (ref 8–16)
BASOPHILS # BLD AUTO: 0.03 K/UL (ref 0–0.2)
BASOPHILS # BLD AUTO: 0.04 K/UL (ref 0–0.2)
BASOPHILS NFR BLD: 0.4 % (ref 0–1.9)
BASOPHILS NFR BLD: 0.5 % (ref 0–1.9)
BUN SERPL-MCNC: 10 MG/DL (ref 8–23)
CALCIUM SERPL-MCNC: 7.5 MG/DL (ref 8.7–10.5)
CHLORIDE SERPL-SCNC: 111 MMOL/L (ref 95–110)
CHOLEST SERPL-MCNC: 111 MG/DL (ref 120–199)
CHOLEST/HDLC SERPL: 5 {RATIO} (ref 2–5)
CO2 SERPL-SCNC: 21 MMOL/L (ref 23–29)
CREAT SERPL-MCNC: 0.7 MG/DL (ref 0.5–1.4)
DIFFERENTIAL METHOD: ABNORMAL
DIFFERENTIAL METHOD: ABNORMAL
EOSINOPHIL # BLD AUTO: 0.3 K/UL (ref 0–0.5)
EOSINOPHIL # BLD AUTO: 0.3 K/UL (ref 0–0.5)
EOSINOPHIL NFR BLD: 3.6 % (ref 0–8)
EOSINOPHIL NFR BLD: 4.1 % (ref 0–8)
ERYTHROCYTE [DISTWIDTH] IN BLOOD BY AUTOMATED COUNT: 14.7 % (ref 11.5–14.5)
ERYTHROCYTE [DISTWIDTH] IN BLOOD BY AUTOMATED COUNT: 14.8 % (ref 11.5–14.5)
EST. GFR  (AFRICAN AMERICAN): >60 ML/MIN/1.73 M^2
EST. GFR  (NON AFRICAN AMERICAN): >60 ML/MIN/1.73 M^2
ESTIMATED AVG GLUCOSE: 80 MG/DL (ref 68–131)
GLUCOSE SERPL-MCNC: 82 MG/DL (ref 70–110)
HBA1C MFR BLD: 4.4 % (ref 4–5.6)
HCT VFR BLD AUTO: 36.3 % (ref 37–48.5)
HCT VFR BLD AUTO: 40.2 % (ref 37–48.5)
HDLC SERPL-MCNC: 22 MG/DL (ref 40–75)
HDLC SERPL: 19.8 % (ref 20–50)
HGB BLD-MCNC: 12.3 G/DL (ref 12–16)
HGB BLD-MCNC: 13.2 G/DL (ref 12–16)
IMM GRANULOCYTES # BLD AUTO: 0.02 K/UL (ref 0–0.04)
IMM GRANULOCYTES # BLD AUTO: 0.06 K/UL (ref 0–0.04)
IMM GRANULOCYTES NFR BLD AUTO: 0.3 % (ref 0–0.5)
IMM GRANULOCYTES NFR BLD AUTO: 0.7 % (ref 0–0.5)
LDLC SERPL CALC-MCNC: 68 MG/DL (ref 63–159)
LYMPHOCYTES # BLD AUTO: 3 K/UL (ref 1–4.8)
LYMPHOCYTES # BLD AUTO: 3.5 K/UL (ref 1–4.8)
LYMPHOCYTES NFR BLD: 38.1 % (ref 18–48)
LYMPHOCYTES NFR BLD: 41.3 % (ref 18–48)
MCH RBC QN AUTO: 30.1 PG (ref 27–31)
MCH RBC QN AUTO: 30.2 PG (ref 27–31)
MCHC RBC AUTO-ENTMCNC: 32.8 G/DL (ref 32–36)
MCHC RBC AUTO-ENTMCNC: 33.9 G/DL (ref 32–36)
MCV RBC AUTO: 89 FL (ref 82–98)
MCV RBC AUTO: 92 FL (ref 82–98)
MONOCYTES # BLD AUTO: 0.6 K/UL (ref 0.3–1)
MONOCYTES # BLD AUTO: 0.6 K/UL (ref 0.3–1)
MONOCYTES NFR BLD: 7 % (ref 4–15)
MONOCYTES NFR BLD: 7.4 % (ref 4–15)
NEUTROPHILS # BLD AUTO: 3.9 K/UL (ref 1.8–7.7)
NEUTROPHILS # BLD AUTO: 4 K/UL (ref 1.8–7.7)
NEUTROPHILS NFR BLD: 46 % (ref 38–73)
NEUTROPHILS NFR BLD: 50.6 % (ref 38–73)
NONHDLC SERPL-MCNC: 89 MG/DL
NRBC BLD-RTO: 0 /100 WBC
NRBC BLD-RTO: 0 /100 WBC
PLATELET # BLD AUTO: 159 K/UL (ref 150–450)
PLATELET # BLD AUTO: 201 K/UL (ref 150–450)
PMV BLD AUTO: 10.9 FL (ref 9.2–12.9)
PMV BLD AUTO: 9.9 FL (ref 9.2–12.9)
POTASSIUM SERPL-SCNC: 3.5 MMOL/L (ref 3.5–5.1)
RBC # BLD AUTO: 4.09 M/UL (ref 4–5.4)
RBC # BLD AUTO: 4.37 M/UL (ref 4–5.4)
SODIUM SERPL-SCNC: 138 MMOL/L (ref 136–145)
TRIGL SERPL-MCNC: 105 MG/DL (ref 30–150)
WBC # BLD AUTO: 7.96 K/UL (ref 3.9–12.7)
WBC # BLD AUTO: 8.36 K/UL (ref 3.9–12.7)

## 2021-11-19 PROCEDURE — 83036 HEMOGLOBIN GLYCOSYLATED A1C: CPT | Mod: HCNC | Performed by: STUDENT IN AN ORGANIZED HEALTH CARE EDUCATION/TRAINING PROGRAM

## 2021-11-19 PROCEDURE — 97530 THERAPEUTIC ACTIVITIES: CPT | Mod: HCNC

## 2021-11-19 PROCEDURE — 36415 COLL VENOUS BLD VENIPUNCTURE: CPT | Mod: HCNC | Performed by: STUDENT IN AN ORGANIZED HEALTH CARE EDUCATION/TRAINING PROGRAM

## 2021-11-19 PROCEDURE — 63600175 PHARM REV CODE 636 W HCPCS: Mod: HCNC | Performed by: STUDENT IN AN ORGANIZED HEALTH CARE EDUCATION/TRAINING PROGRAM

## 2021-11-19 PROCEDURE — 25000003 PHARM REV CODE 250: Mod: HCNC | Performed by: STUDENT IN AN ORGANIZED HEALTH CARE EDUCATION/TRAINING PROGRAM

## 2021-11-19 PROCEDURE — 85025 COMPLETE CBC W/AUTO DIFF WBC: CPT | Mod: 91,HCNC | Performed by: STUDENT IN AN ORGANIZED HEALTH CARE EDUCATION/TRAINING PROGRAM

## 2021-11-19 PROCEDURE — 25500020 PHARM REV CODE 255: Mod: HCNC | Performed by: STUDENT IN AN ORGANIZED HEALTH CARE EDUCATION/TRAINING PROGRAM

## 2021-11-19 PROCEDURE — 97535 SELF CARE MNGMENT TRAINING: CPT | Mod: HCNC

## 2021-11-19 PROCEDURE — 99226 PR SUBSEQUENT OBSERVATION CARE,LEVEL III: CPT | Mod: HCNC,,, | Performed by: STUDENT IN AN ORGANIZED HEALTH CARE EDUCATION/TRAINING PROGRAM

## 2021-11-19 PROCEDURE — G0378 HOSPITAL OBSERVATION PER HR: HCPCS | Mod: HCNC

## 2021-11-19 PROCEDURE — 97165 OT EVAL LOW COMPLEX 30 MIN: CPT | Mod: HCNC

## 2021-11-19 PROCEDURE — 99226 PR SUBSEQUENT OBSERVATION CARE,LEVEL III: ICD-10-PCS | Mod: HCNC,,, | Performed by: STUDENT IN AN ORGANIZED HEALTH CARE EDUCATION/TRAINING PROGRAM

## 2021-11-19 PROCEDURE — 80048 BASIC METABOLIC PNL TOTAL CA: CPT | Mod: HCNC | Performed by: STUDENT IN AN ORGANIZED HEALTH CARE EDUCATION/TRAINING PROGRAM

## 2021-11-19 PROCEDURE — 97161 PT EVAL LOW COMPLEX 20 MIN: CPT | Mod: HCNC

## 2021-11-19 PROCEDURE — C9113 INJ PANTOPRAZOLE SODIUM, VIA: HCPCS | Mod: HCNC | Performed by: STUDENT IN AN ORGANIZED HEALTH CARE EDUCATION/TRAINING PROGRAM

## 2021-11-19 PROCEDURE — 80061 LIPID PANEL: CPT | Mod: HCNC | Performed by: STUDENT IN AN ORGANIZED HEALTH CARE EDUCATION/TRAINING PROGRAM

## 2021-11-19 RX ORDER — SODIUM CHLORIDE, SODIUM LACTATE, POTASSIUM CHLORIDE, CALCIUM CHLORIDE 600; 310; 30; 20 MG/100ML; MG/100ML; MG/100ML; MG/100ML
INJECTION, SOLUTION INTRAVENOUS CONTINUOUS
Status: DISCONTINUED | OUTPATIENT
Start: 2021-11-19 | End: 2021-11-21

## 2021-11-19 RX ADMIN — PANTOPRAZOLE SODIUM 40 MG: 40 INJECTION, POWDER, FOR SOLUTION INTRAVENOUS at 09:11

## 2021-11-19 RX ADMIN — SODIUM CHLORIDE, SODIUM LACTATE, POTASSIUM CHLORIDE, AND CALCIUM CHLORIDE: .6; .31; .03; .02 INJECTION, SOLUTION INTRAVENOUS at 08:11

## 2021-11-19 RX ADMIN — MICONAZOLE NITRATE: 20 POWDER TOPICAL at 09:11

## 2021-11-19 RX ADMIN — MICONAZOLE NITRATE: 20 POWDER TOPICAL at 08:11

## 2021-11-19 RX ADMIN — SODIUM CHLORIDE, SODIUM LACTATE, POTASSIUM CHLORIDE, AND CALCIUM CHLORIDE: .6; .31; .03; .02 INJECTION, SOLUTION INTRAVENOUS at 09:11

## 2021-11-19 RX ADMIN — IOHEXOL 100 ML: 350 INJECTION, SOLUTION INTRAVENOUS at 05:11

## 2021-11-20 LAB
ANION GAP SERPL CALC-SCNC: 6 MMOL/L (ref 8–16)
BASOPHILS # BLD AUTO: 0.03 K/UL (ref 0–0.2)
BASOPHILS NFR BLD: 0.4 % (ref 0–1.9)
BUN SERPL-MCNC: 9 MG/DL (ref 8–23)
CALCIUM SERPL-MCNC: 7.5 MG/DL (ref 8.7–10.5)
CHLORIDE SERPL-SCNC: 110 MMOL/L (ref 95–110)
CO2 SERPL-SCNC: 22 MMOL/L (ref 23–29)
CREAT SERPL-MCNC: 0.8 MG/DL (ref 0.5–1.4)
DIFFERENTIAL METHOD: ABNORMAL
EOSINOPHIL # BLD AUTO: 0.4 K/UL (ref 0–0.5)
EOSINOPHIL NFR BLD: 5.9 % (ref 0–8)
ERYTHROCYTE [DISTWIDTH] IN BLOOD BY AUTOMATED COUNT: 14.8 % (ref 11.5–14.5)
EST. GFR  (AFRICAN AMERICAN): >60 ML/MIN/1.73 M^2
EST. GFR  (NON AFRICAN AMERICAN): >60 ML/MIN/1.73 M^2
GLUCOSE SERPL-MCNC: 64 MG/DL (ref 70–110)
HCT VFR BLD AUTO: 34.5 % (ref 37–48.5)
HGB BLD-MCNC: 11.2 G/DL (ref 12–16)
IMM GRANULOCYTES # BLD AUTO: 0.01 K/UL (ref 0–0.04)
IMM GRANULOCYTES NFR BLD AUTO: 0.1 % (ref 0–0.5)
LYMPHOCYTES # BLD AUTO: 3 K/UL (ref 1–4.8)
LYMPHOCYTES NFR BLD: 44.4 % (ref 18–48)
MCH RBC QN AUTO: 29.6 PG (ref 27–31)
MCHC RBC AUTO-ENTMCNC: 32.5 G/DL (ref 32–36)
MCV RBC AUTO: 91 FL (ref 82–98)
MONOCYTES # BLD AUTO: 0.6 K/UL (ref 0.3–1)
MONOCYTES NFR BLD: 8.1 % (ref 4–15)
NEUTROPHILS # BLD AUTO: 2.8 K/UL (ref 1.8–7.7)
NEUTROPHILS NFR BLD: 41.1 % (ref 38–73)
NRBC BLD-RTO: 0 /100 WBC
PLATELET # BLD AUTO: 193 K/UL (ref 150–450)
PMV BLD AUTO: 10.7 FL (ref 9.2–12.9)
POTASSIUM SERPL-SCNC: 3.4 MMOL/L (ref 3.5–5.1)
RBC # BLD AUTO: 3.79 M/UL (ref 4–5.4)
SODIUM SERPL-SCNC: 138 MMOL/L (ref 136–145)
WBC # BLD AUTO: 6.75 K/UL (ref 3.9–12.7)

## 2021-11-20 PROCEDURE — 80048 BASIC METABOLIC PNL TOTAL CA: CPT | Mod: HCNC | Performed by: STUDENT IN AN ORGANIZED HEALTH CARE EDUCATION/TRAINING PROGRAM

## 2021-11-20 PROCEDURE — 99226 PR SUBSEQUENT OBSERVATION CARE,LEVEL III: CPT | Mod: HCNC,,, | Performed by: STUDENT IN AN ORGANIZED HEALTH CARE EDUCATION/TRAINING PROGRAM

## 2021-11-20 PROCEDURE — 63600175 PHARM REV CODE 636 W HCPCS: Mod: HCNC | Performed by: STUDENT IN AN ORGANIZED HEALTH CARE EDUCATION/TRAINING PROGRAM

## 2021-11-20 PROCEDURE — 99205 PR OFFICE/OUTPT VISIT, NEW, LEVL V, 60-74 MIN: ICD-10-PCS | Mod: HCNC,GC,, | Performed by: STUDENT IN AN ORGANIZED HEALTH CARE EDUCATION/TRAINING PROGRAM

## 2021-11-20 PROCEDURE — 36415 COLL VENOUS BLD VENIPUNCTURE: CPT | Mod: HCNC | Performed by: STUDENT IN AN ORGANIZED HEALTH CARE EDUCATION/TRAINING PROGRAM

## 2021-11-20 PROCEDURE — 85025 COMPLETE CBC W/AUTO DIFF WBC: CPT | Mod: HCNC | Performed by: STUDENT IN AN ORGANIZED HEALTH CARE EDUCATION/TRAINING PROGRAM

## 2021-11-20 PROCEDURE — 99226 PR SUBSEQUENT OBSERVATION CARE,LEVEL III: ICD-10-PCS | Mod: HCNC,,, | Performed by: STUDENT IN AN ORGANIZED HEALTH CARE EDUCATION/TRAINING PROGRAM

## 2021-11-20 PROCEDURE — G0378 HOSPITAL OBSERVATION PER HR: HCPCS | Mod: HCNC

## 2021-11-20 PROCEDURE — 99205 OFFICE O/P NEW HI 60 MIN: CPT | Mod: HCNC,GC,, | Performed by: STUDENT IN AN ORGANIZED HEALTH CARE EDUCATION/TRAINING PROGRAM

## 2021-11-20 PROCEDURE — C9113 INJ PANTOPRAZOLE SODIUM, VIA: HCPCS | Mod: HCNC | Performed by: STUDENT IN AN ORGANIZED HEALTH CARE EDUCATION/TRAINING PROGRAM

## 2021-11-20 PROCEDURE — 94761 N-INVAS EAR/PLS OXIMETRY MLT: CPT | Mod: HCNC

## 2021-11-20 RX ADMIN — SODIUM CHLORIDE, SODIUM LACTATE, POTASSIUM CHLORIDE, AND CALCIUM CHLORIDE: .6; .31; .03; .02 INJECTION, SOLUTION INTRAVENOUS at 10:11

## 2021-11-20 RX ADMIN — MICONAZOLE NITRATE: 20 POWDER TOPICAL at 10:11

## 2021-11-20 RX ADMIN — PANTOPRAZOLE SODIUM 40 MG: 40 INJECTION, POWDER, FOR SOLUTION INTRAVENOUS at 10:11

## 2021-11-20 RX ADMIN — MICONAZOLE NITRATE: 20 POWDER TOPICAL at 09:11

## 2021-11-21 LAB
ANION GAP SERPL CALC-SCNC: 7 MMOL/L (ref 8–16)
BASOPHILS # BLD AUTO: 0.03 K/UL (ref 0–0.2)
BASOPHILS NFR BLD: 0.5 % (ref 0–1.9)
BUN SERPL-MCNC: 7 MG/DL (ref 8–23)
CALCIUM SERPL-MCNC: 7.5 MG/DL (ref 8.7–10.5)
CHLORIDE SERPL-SCNC: 113 MMOL/L (ref 95–110)
CO2 SERPL-SCNC: 22 MMOL/L (ref 23–29)
CREAT SERPL-MCNC: 0.8 MG/DL (ref 0.5–1.4)
DIFFERENTIAL METHOD: ABNORMAL
EOSINOPHIL # BLD AUTO: 0.5 K/UL (ref 0–0.5)
EOSINOPHIL NFR BLD: 8 % (ref 0–8)
ERYTHROCYTE [DISTWIDTH] IN BLOOD BY AUTOMATED COUNT: 15.1 % (ref 11.5–14.5)
EST. GFR  (AFRICAN AMERICAN): >60 ML/MIN/1.73 M^2
EST. GFR  (NON AFRICAN AMERICAN): >60 ML/MIN/1.73 M^2
GLUCOSE SERPL-MCNC: 74 MG/DL (ref 70–110)
HCT VFR BLD AUTO: 34.6 % (ref 37–48.5)
HGB BLD-MCNC: 11.4 G/DL (ref 12–16)
IMM GRANULOCYTES # BLD AUTO: 0.02 K/UL (ref 0–0.04)
IMM GRANULOCYTES NFR BLD AUTO: 0.4 % (ref 0–0.5)
LYMPHOCYTES # BLD AUTO: 2.5 K/UL (ref 1–4.8)
LYMPHOCYTES NFR BLD: 43.5 % (ref 18–48)
MCH RBC QN AUTO: 29.8 PG (ref 27–31)
MCHC RBC AUTO-ENTMCNC: 32.9 G/DL (ref 32–36)
MCV RBC AUTO: 90 FL (ref 82–98)
MONOCYTES # BLD AUTO: 0.4 K/UL (ref 0.3–1)
MONOCYTES NFR BLD: 7.8 % (ref 4–15)
NEUTROPHILS # BLD AUTO: 2.2 K/UL (ref 1.8–7.7)
NEUTROPHILS NFR BLD: 39.8 % (ref 38–73)
NRBC BLD-RTO: 0 /100 WBC
PLATELET # BLD AUTO: 178 K/UL (ref 150–450)
PMV BLD AUTO: 9.8 FL (ref 9.2–12.9)
POTASSIUM SERPL-SCNC: 3.7 MMOL/L (ref 3.5–5.1)
RBC # BLD AUTO: 3.83 M/UL (ref 4–5.4)
SODIUM SERPL-SCNC: 142 MMOL/L (ref 136–145)
WBC # BLD AUTO: 5.63 K/UL (ref 3.9–12.7)

## 2021-11-21 PROCEDURE — 99225 PR SUBSEQUENT OBSERVATION CARE,LEVEL II: CPT | Mod: HCNC,,, | Performed by: STUDENT IN AN ORGANIZED HEALTH CARE EDUCATION/TRAINING PROGRAM

## 2021-11-21 PROCEDURE — G0378 HOSPITAL OBSERVATION PER HR: HCPCS | Mod: HCNC

## 2021-11-21 PROCEDURE — 25000003 PHARM REV CODE 250: Mod: HCNC | Performed by: STUDENT IN AN ORGANIZED HEALTH CARE EDUCATION/TRAINING PROGRAM

## 2021-11-21 PROCEDURE — 36415 COLL VENOUS BLD VENIPUNCTURE: CPT | Mod: HCNC | Performed by: STUDENT IN AN ORGANIZED HEALTH CARE EDUCATION/TRAINING PROGRAM

## 2021-11-21 PROCEDURE — C9113 INJ PANTOPRAZOLE SODIUM, VIA: HCPCS | Mod: HCNC | Performed by: STUDENT IN AN ORGANIZED HEALTH CARE EDUCATION/TRAINING PROGRAM

## 2021-11-21 PROCEDURE — 94761 N-INVAS EAR/PLS OXIMETRY MLT: CPT | Mod: HCNC

## 2021-11-21 PROCEDURE — 63600175 PHARM REV CODE 636 W HCPCS: Mod: HCNC | Performed by: STUDENT IN AN ORGANIZED HEALTH CARE EDUCATION/TRAINING PROGRAM

## 2021-11-21 PROCEDURE — 99225 PR SUBSEQUENT OBSERVATION CARE,LEVEL II: ICD-10-PCS | Mod: HCNC,,, | Performed by: STUDENT IN AN ORGANIZED HEALTH CARE EDUCATION/TRAINING PROGRAM

## 2021-11-21 PROCEDURE — 80048 BASIC METABOLIC PNL TOTAL CA: CPT | Mod: HCNC | Performed by: STUDENT IN AN ORGANIZED HEALTH CARE EDUCATION/TRAINING PROGRAM

## 2021-11-21 PROCEDURE — 85025 COMPLETE CBC W/AUTO DIFF WBC: CPT | Mod: HCNC | Performed by: STUDENT IN AN ORGANIZED HEALTH CARE EDUCATION/TRAINING PROGRAM

## 2021-11-21 RX ORDER — POLYETHYLENE GLYCOL 3350, SODIUM SULFATE ANHYDROUS, SODIUM BICARBONATE, SODIUM CHLORIDE, POTASSIUM CHLORIDE 236; 22.74; 6.74; 5.86; 2.97 G/4L; G/4L; G/4L; G/4L; G/4L
4000 POWDER, FOR SOLUTION ORAL ONCE
Status: COMPLETED | OUTPATIENT
Start: 2021-11-21 | End: 2021-11-21

## 2021-11-21 RX ADMIN — MICONAZOLE NITRATE: 20 POWDER TOPICAL at 08:11

## 2021-11-21 RX ADMIN — PANTOPRAZOLE SODIUM 40 MG: 40 INJECTION, POWDER, FOR SOLUTION INTRAVENOUS at 08:11

## 2021-11-21 RX ADMIN — POLYETHYLENE GLYCOL 3350, SODIUM SULFATE ANHYDROUS, SODIUM BICARBONATE, SODIUM CHLORIDE, POTASSIUM CHLORIDE 4000 ML: 236; 22.74; 6.74; 5.86; 2.97 POWDER, FOR SOLUTION ORAL at 06:11

## 2021-11-21 RX ADMIN — PANTOPRAZOLE SODIUM 40 MG: 40 INJECTION, POWDER, FOR SOLUTION INTRAVENOUS at 12:11

## 2021-11-21 RX ADMIN — MICONAZOLE NITRATE: 20 POWDER TOPICAL at 12:11

## 2021-11-22 ENCOUNTER — ANESTHESIA EVENT (OUTPATIENT)
Dept: ENDOSCOPY | Facility: HOSPITAL | Age: 81
DRG: 378 | End: 2021-11-22
Payer: MEDICARE

## 2021-11-22 ENCOUNTER — ANESTHESIA (OUTPATIENT)
Dept: ENDOSCOPY | Facility: HOSPITAL | Age: 81
DRG: 378 | End: 2021-11-22
Payer: MEDICARE

## 2021-11-22 PROBLEM — K57.90 DIVERTICULOSIS: Status: ACTIVE | Noted: 2021-11-18

## 2021-11-22 LAB
ANION GAP SERPL CALC-SCNC: 5 MMOL/L (ref 8–16)
BASOPHILS # BLD AUTO: 0.03 K/UL (ref 0–0.2)
BASOPHILS NFR BLD: 0.5 % (ref 0–1.9)
BUN SERPL-MCNC: 7 MG/DL (ref 8–23)
CALCIUM SERPL-MCNC: 7.3 MG/DL (ref 8.7–10.5)
CHLORIDE SERPL-SCNC: 113 MMOL/L (ref 95–110)
CO2 SERPL-SCNC: 22 MMOL/L (ref 23–29)
CREAT SERPL-MCNC: 0.7 MG/DL (ref 0.5–1.4)
DIFFERENTIAL METHOD: ABNORMAL
EOSINOPHIL # BLD AUTO: 0.4 K/UL (ref 0–0.5)
EOSINOPHIL NFR BLD: 7 % (ref 0–8)
ERYTHROCYTE [DISTWIDTH] IN BLOOD BY AUTOMATED COUNT: 15 % (ref 11.5–14.5)
EST. GFR  (AFRICAN AMERICAN): >60 ML/MIN/1.73 M^2
EST. GFR  (NON AFRICAN AMERICAN): >60 ML/MIN/1.73 M^2
GLUCOSE SERPL-MCNC: 69 MG/DL (ref 70–110)
HCT VFR BLD AUTO: 34 % (ref 37–48.5)
HGB BLD-MCNC: 11.4 G/DL (ref 12–16)
IMM GRANULOCYTES # BLD AUTO: 0.02 K/UL (ref 0–0.04)
IMM GRANULOCYTES NFR BLD AUTO: 0.3 % (ref 0–0.5)
LYMPHOCYTES # BLD AUTO: 2.7 K/UL (ref 1–4.8)
LYMPHOCYTES NFR BLD: 43.6 % (ref 18–48)
MCH RBC QN AUTO: 30.3 PG (ref 27–31)
MCHC RBC AUTO-ENTMCNC: 33.5 G/DL (ref 32–36)
MCV RBC AUTO: 90 FL (ref 82–98)
MONOCYTES # BLD AUTO: 0.4 K/UL (ref 0.3–1)
MONOCYTES NFR BLD: 7.2 % (ref 4–15)
NEUTROPHILS # BLD AUTO: 2.6 K/UL (ref 1.8–7.7)
NEUTROPHILS NFR BLD: 41.4 % (ref 38–73)
NRBC BLD-RTO: 0 /100 WBC
PLATELET # BLD AUTO: 186 K/UL (ref 150–450)
PMV BLD AUTO: 9.7 FL (ref 9.2–12.9)
POTASSIUM SERPL-SCNC: 3.4 MMOL/L (ref 3.5–5.1)
RBC # BLD AUTO: 3.76 M/UL (ref 4–5.4)
SARS-COV-2 RDRP RESP QL NAA+PROBE: NEGATIVE
SODIUM SERPL-SCNC: 140 MMOL/L (ref 136–145)
WBC # BLD AUTO: 6.15 K/UL (ref 3.9–12.7)

## 2021-11-22 PROCEDURE — 30200315 PPD INTRADERMAL TEST REV CODE 302: Mod: HCNC | Performed by: STUDENT IN AN ORGANIZED HEALTH CARE EDUCATION/TRAINING PROGRAM

## 2021-11-22 PROCEDURE — 97530 THERAPEUTIC ACTIVITIES: CPT | Mod: HCNC

## 2021-11-22 PROCEDURE — 85025 COMPLETE CBC W/AUTO DIFF WBC: CPT | Mod: HCNC | Performed by: STUDENT IN AN ORGANIZED HEALTH CARE EDUCATION/TRAINING PROGRAM

## 2021-11-22 PROCEDURE — 97112 NEUROMUSCULAR REEDUCATION: CPT | Mod: HCNC

## 2021-11-22 PROCEDURE — 37000008 HC ANESTHESIA 1ST 15 MINUTES: Mod: HCNC | Performed by: STUDENT IN AN ORGANIZED HEALTH CARE EDUCATION/TRAINING PROGRAM

## 2021-11-22 PROCEDURE — 45385 COLONOSCOPY W/LESION REMOVAL: CPT | Mod: HCNC,GC,, | Performed by: STUDENT IN AN ORGANIZED HEALTH CARE EDUCATION/TRAINING PROGRAM

## 2021-11-22 PROCEDURE — 97110 THERAPEUTIC EXERCISES: CPT | Mod: HCNC

## 2021-11-22 PROCEDURE — 63600175 PHARM REV CODE 636 W HCPCS: Mod: HCNC | Performed by: STUDENT IN AN ORGANIZED HEALTH CARE EDUCATION/TRAINING PROGRAM

## 2021-11-22 PROCEDURE — 25000003 PHARM REV CODE 250: Mod: HCNC | Performed by: STUDENT IN AN ORGANIZED HEALTH CARE EDUCATION/TRAINING PROGRAM

## 2021-11-22 PROCEDURE — G0378 HOSPITAL OBSERVATION PER HR: HCPCS | Mod: HCNC

## 2021-11-22 PROCEDURE — 37000009 HC ANESTHESIA EA ADD 15 MINS: Mod: HCNC | Performed by: STUDENT IN AN ORGANIZED HEALTH CARE EDUCATION/TRAINING PROGRAM

## 2021-11-22 PROCEDURE — 94760 N-INVAS EAR/PLS OXIMETRY 1: CPT | Mod: HCNC

## 2021-11-22 PROCEDURE — D9220A PRA ANESTHESIA: ICD-10-PCS | Mod: HCNC,ANES,, | Performed by: ANESTHESIOLOGY

## 2021-11-22 PROCEDURE — C9113 INJ PANTOPRAZOLE SODIUM, VIA: HCPCS | Mod: HCNC | Performed by: STUDENT IN AN ORGANIZED HEALTH CARE EDUCATION/TRAINING PROGRAM

## 2021-11-22 PROCEDURE — 27201089 HC SNARE, DISP (ANY): Mod: HCNC | Performed by: STUDENT IN AN ORGANIZED HEALTH CARE EDUCATION/TRAINING PROGRAM

## 2021-11-22 PROCEDURE — 99225 PR SUBSEQUENT OBSERVATION CARE,LEVEL II: ICD-10-PCS | Mod: HCNC,,, | Performed by: STUDENT IN AN ORGANIZED HEALTH CARE EDUCATION/TRAINING PROGRAM

## 2021-11-22 PROCEDURE — 45385 COLONOSCOPY W/LESION REMOVAL: CPT | Mod: HCNC | Performed by: STUDENT IN AN ORGANIZED HEALTH CARE EDUCATION/TRAINING PROGRAM

## 2021-11-22 PROCEDURE — 99225 PR SUBSEQUENT OBSERVATION CARE,LEVEL II: CPT | Mod: HCNC,,, | Performed by: STUDENT IN AN ORGANIZED HEALTH CARE EDUCATION/TRAINING PROGRAM

## 2021-11-22 PROCEDURE — 88305 TISSUE EXAM BY PATHOLOGIST: ICD-10-PCS | Mod: 26,HCNC,, | Performed by: PATHOLOGY

## 2021-11-22 PROCEDURE — 36415 COLL VENOUS BLD VENIPUNCTURE: CPT | Mod: HCNC | Performed by: STUDENT IN AN ORGANIZED HEALTH CARE EDUCATION/TRAINING PROGRAM

## 2021-11-22 PROCEDURE — D9220A PRA ANESTHESIA: ICD-10-PCS | Mod: HCNC,CRNA,, | Performed by: STUDENT IN AN ORGANIZED HEALTH CARE EDUCATION/TRAINING PROGRAM

## 2021-11-22 PROCEDURE — D9220A PRA ANESTHESIA: Mod: HCNC,ANES,, | Performed by: ANESTHESIOLOGY

## 2021-11-22 PROCEDURE — U0002 COVID-19 LAB TEST NON-CDC: HCPCS | Mod: HCNC | Performed by: STUDENT IN AN ORGANIZED HEALTH CARE EDUCATION/TRAINING PROGRAM

## 2021-11-22 PROCEDURE — 25000003 PHARM REV CODE 250: Mod: HCNC | Performed by: PHYSICIAN ASSISTANT

## 2021-11-22 PROCEDURE — 45385 PR COLONOSCOPY,REMV LESN,SNARE: ICD-10-PCS | Mod: HCNC,GC,, | Performed by: STUDENT IN AN ORGANIZED HEALTH CARE EDUCATION/TRAINING PROGRAM

## 2021-11-22 PROCEDURE — 97535 SELF CARE MNGMENT TRAINING: CPT | Mod: HCNC

## 2021-11-22 PROCEDURE — 80048 BASIC METABOLIC PNL TOTAL CA: CPT | Mod: HCNC | Performed by: STUDENT IN AN ORGANIZED HEALTH CARE EDUCATION/TRAINING PROGRAM

## 2021-11-22 PROCEDURE — 88305 TISSUE EXAM BY PATHOLOGIST: CPT | Mod: HCNC | Performed by: PATHOLOGY

## 2021-11-22 PROCEDURE — D9220A PRA ANESTHESIA: Mod: HCNC,CRNA,, | Performed by: STUDENT IN AN ORGANIZED HEALTH CARE EDUCATION/TRAINING PROGRAM

## 2021-11-22 PROCEDURE — 88305 TISSUE EXAM BY PATHOLOGIST: CPT | Mod: 26,HCNC,, | Performed by: PATHOLOGY

## 2021-11-22 PROCEDURE — 86580 TB INTRADERMAL TEST: CPT | Mod: HCNC | Performed by: STUDENT IN AN ORGANIZED HEALTH CARE EDUCATION/TRAINING PROGRAM

## 2021-11-22 RX ORDER — ONDANSETRON 2 MG/ML
4 INJECTION INTRAMUSCULAR; INTRAVENOUS DAILY PRN
Status: CANCELLED | OUTPATIENT
Start: 2021-11-22

## 2021-11-22 RX ORDER — PHENYLEPHRINE HYDROCHLORIDE 10 MG/ML
INJECTION INTRAVENOUS
Status: DISCONTINUED | OUTPATIENT
Start: 2021-11-22 | End: 2021-11-22

## 2021-11-22 RX ORDER — FENTANYL CITRATE 50 UG/ML
25 INJECTION, SOLUTION INTRAMUSCULAR; INTRAVENOUS EVERY 5 MIN PRN
Status: CANCELLED | OUTPATIENT
Start: 2021-11-22

## 2021-11-22 RX ORDER — PROPOFOL 10 MG/ML
VIAL (ML) INTRAVENOUS
Status: DISCONTINUED | OUTPATIENT
Start: 2021-11-22 | End: 2021-11-22

## 2021-11-22 RX ORDER — PROPOFOL 10 MG/ML
VIAL (ML) INTRAVENOUS CONTINUOUS PRN
Status: DISCONTINUED | OUTPATIENT
Start: 2021-11-22 | End: 2021-11-22

## 2021-11-22 RX ORDER — LIDOCAINE HYDROCHLORIDE 20 MG/ML
INJECTION INTRAVENOUS
Status: DISCONTINUED | OUTPATIENT
Start: 2021-11-22 | End: 2021-11-22

## 2021-11-22 RX ADMIN — TUBERCULIN PURIFIED PROTEIN DERIVATIVE 5 UNITS: 5 INJECTION, SOLUTION INTRADERMAL at 06:11

## 2021-11-22 RX ADMIN — SODIUM CHLORIDE: 0.9 INJECTION, SOLUTION INTRAVENOUS at 01:11

## 2021-11-22 RX ADMIN — MICONAZOLE NITRATE: 20 POWDER TOPICAL at 09:11

## 2021-11-22 RX ADMIN — Medication 150 MCG/KG/MIN: at 01:11

## 2021-11-22 RX ADMIN — PANTOPRAZOLE SODIUM 40 MG: 40 INJECTION, POWDER, FOR SOLUTION INTRAVENOUS at 09:11

## 2021-11-22 RX ADMIN — PHENYLEPHRINE HYDROCHLORIDE 100 MCG: 10 INJECTION INTRAVENOUS at 02:11

## 2021-11-22 RX ADMIN — PROPOFOL 70 MG: 10 INJECTION, EMULSION INTRAVENOUS at 01:11

## 2021-11-22 RX ADMIN — LIDOCAINE HYDROCHLORIDE 100 MG: 20 INJECTION, SOLUTION INTRAVENOUS at 01:11

## 2021-11-22 RX ADMIN — Medication 6 MG: at 08:11

## 2021-11-22 RX ADMIN — PHENYLEPHRINE HYDROCHLORIDE 200 MCG: 10 INJECTION INTRAVENOUS at 02:11

## 2021-11-22 RX ADMIN — MICONAZOLE NITRATE: 20 POWDER TOPICAL at 08:11

## 2021-11-22 RX ADMIN — PANTOPRAZOLE SODIUM 40 MG: 40 INJECTION, POWDER, FOR SOLUTION INTRAVENOUS at 08:11

## 2021-11-23 LAB
ANION GAP SERPL CALC-SCNC: 6 MMOL/L (ref 8–16)
BASOPHILS # BLD AUTO: 0.03 K/UL (ref 0–0.2)
BASOPHILS NFR BLD: 0.6 % (ref 0–1.9)
BUN SERPL-MCNC: 6 MG/DL (ref 8–23)
CALCIUM SERPL-MCNC: 7.5 MG/DL (ref 8.7–10.5)
CHLORIDE SERPL-SCNC: 109 MMOL/L (ref 95–110)
CO2 SERPL-SCNC: 25 MMOL/L (ref 23–29)
CREAT SERPL-MCNC: 0.7 MG/DL (ref 0.5–1.4)
DIFFERENTIAL METHOD: ABNORMAL
EOSINOPHIL # BLD AUTO: 0.4 K/UL (ref 0–0.5)
EOSINOPHIL NFR BLD: 7.3 % (ref 0–8)
ERYTHROCYTE [DISTWIDTH] IN BLOOD BY AUTOMATED COUNT: 15.1 % (ref 11.5–14.5)
EST. GFR  (AFRICAN AMERICAN): >60 ML/MIN/1.73 M^2
EST. GFR  (NON AFRICAN AMERICAN): >60 ML/MIN/1.73 M^2
GLUCOSE SERPL-MCNC: 64 MG/DL (ref 70–110)
HCT VFR BLD AUTO: 33.1 % (ref 37–48.5)
HGB BLD-MCNC: 11 G/DL (ref 12–16)
IMM GRANULOCYTES # BLD AUTO: 0.02 K/UL (ref 0–0.04)
IMM GRANULOCYTES NFR BLD AUTO: 0.4 % (ref 0–0.5)
LYMPHOCYTES # BLD AUTO: 2.2 K/UL (ref 1–4.8)
LYMPHOCYTES NFR BLD: 40.6 % (ref 18–48)
MCH RBC QN AUTO: 29.6 PG (ref 27–31)
MCHC RBC AUTO-ENTMCNC: 33.2 G/DL (ref 32–36)
MCV RBC AUTO: 89 FL (ref 82–98)
MONOCYTES # BLD AUTO: 0.4 K/UL (ref 0.3–1)
MONOCYTES NFR BLD: 7.8 % (ref 4–15)
NEUTROPHILS # BLD AUTO: 2.3 K/UL (ref 1.8–7.7)
NEUTROPHILS NFR BLD: 43.3 % (ref 38–73)
NRBC BLD-RTO: 0 /100 WBC
PLATELET # BLD AUTO: 175 K/UL (ref 150–450)
PMV BLD AUTO: 10.4 FL (ref 9.2–12.9)
POTASSIUM SERPL-SCNC: 3.4 MMOL/L (ref 3.5–5.1)
RBC # BLD AUTO: 3.71 M/UL (ref 4–5.4)
SODIUM SERPL-SCNC: 140 MMOL/L (ref 136–145)
WBC # BLD AUTO: 5.37 K/UL (ref 3.9–12.7)

## 2021-11-23 PROCEDURE — 25000003 PHARM REV CODE 250: Mod: HCNC | Performed by: INTERNAL MEDICINE

## 2021-11-23 PROCEDURE — 85025 COMPLETE CBC W/AUTO DIFF WBC: CPT | Mod: HCNC | Performed by: STUDENT IN AN ORGANIZED HEALTH CARE EDUCATION/TRAINING PROGRAM

## 2021-11-23 PROCEDURE — 80048 BASIC METABOLIC PNL TOTAL CA: CPT | Mod: HCNC | Performed by: STUDENT IN AN ORGANIZED HEALTH CARE EDUCATION/TRAINING PROGRAM

## 2021-11-23 PROCEDURE — 12000002 HC ACUTE/MED SURGE SEMI-PRIVATE ROOM: Mod: HCNC

## 2021-11-23 PROCEDURE — 25000003 PHARM REV CODE 250: Mod: HCNC | Performed by: PHYSICIAN ASSISTANT

## 2021-11-23 PROCEDURE — 99225 PR SUBSEQUENT OBSERVATION CARE,LEVEL II: ICD-10-PCS | Mod: HCNC,95,, | Performed by: INTERNAL MEDICINE

## 2021-11-23 PROCEDURE — 63600175 PHARM REV CODE 636 W HCPCS: Mod: HCNC | Performed by: STUDENT IN AN ORGANIZED HEALTH CARE EDUCATION/TRAINING PROGRAM

## 2021-11-23 PROCEDURE — 94760 N-INVAS EAR/PLS OXIMETRY 1: CPT | Mod: HCNC

## 2021-11-23 PROCEDURE — C9113 INJ PANTOPRAZOLE SODIUM, VIA: HCPCS | Mod: HCNC | Performed by: STUDENT IN AN ORGANIZED HEALTH CARE EDUCATION/TRAINING PROGRAM

## 2021-11-23 PROCEDURE — 99225 PR SUBSEQUENT OBSERVATION CARE,LEVEL II: CPT | Mod: HCNC,95,, | Performed by: INTERNAL MEDICINE

## 2021-11-23 PROCEDURE — 36415 COLL VENOUS BLD VENIPUNCTURE: CPT | Mod: HCNC | Performed by: STUDENT IN AN ORGANIZED HEALTH CARE EDUCATION/TRAINING PROGRAM

## 2021-11-23 RX ADMIN — POTASSIUM BICARBONATE 25 MEQ: 978 TABLET, EFFERVESCENT ORAL at 04:11

## 2021-11-23 RX ADMIN — MICONAZOLE NITRATE: 20 POWDER TOPICAL at 09:11

## 2021-11-23 RX ADMIN — PANTOPRAZOLE SODIUM 40 MG: 40 INJECTION, POWDER, FOR SOLUTION INTRAVENOUS at 09:11

## 2021-11-23 RX ADMIN — Medication 6 MG: at 09:11

## 2021-11-23 RX ADMIN — PANTOPRAZOLE SODIUM 40 MG: 40 INJECTION, POWDER, FOR SOLUTION INTRAVENOUS at 08:11

## 2021-11-23 RX ADMIN — MICONAZOLE NITRATE: 20 POWDER TOPICAL at 08:11

## 2021-11-24 LAB
ALBUMIN SERPL BCP-MCNC: 1.8 G/DL (ref 3.5–5.2)
ALP SERPL-CCNC: 64 U/L (ref 55–135)
ALT SERPL W/O P-5'-P-CCNC: 7 U/L (ref 10–44)
ANION GAP SERPL CALC-SCNC: 7 MMOL/L (ref 8–16)
AST SERPL-CCNC: 14 U/L (ref 10–40)
BASOPHILS # BLD AUTO: 0.03 K/UL (ref 0–0.2)
BASOPHILS NFR BLD: 0.4 % (ref 0–1.9)
BILIRUB DIRECT SERPL-MCNC: 0.3 MG/DL (ref 0.1–0.3)
BILIRUB SERPL-MCNC: 0.8 MG/DL (ref 0.1–1)
BUN SERPL-MCNC: 5 MG/DL (ref 8–23)
CALCIUM SERPL-MCNC: 7.3 MG/DL (ref 8.7–10.5)
CHLORIDE SERPL-SCNC: 108 MMOL/L (ref 95–110)
CO2 SERPL-SCNC: 23 MMOL/L (ref 23–29)
CREAT SERPL-MCNC: 0.7 MG/DL (ref 0.5–1.4)
DIFFERENTIAL METHOD: ABNORMAL
EOSINOPHIL # BLD AUTO: 0.4 K/UL (ref 0–0.5)
EOSINOPHIL NFR BLD: 5.3 % (ref 0–8)
ERYTHROCYTE [DISTWIDTH] IN BLOOD BY AUTOMATED COUNT: 15.3 % (ref 11.5–14.5)
EST. GFR  (AFRICAN AMERICAN): >60 ML/MIN/1.73 M^2
EST. GFR  (NON AFRICAN AMERICAN): >60 ML/MIN/1.73 M^2
GLUCOSE SERPL-MCNC: 68 MG/DL (ref 70–110)
HCT VFR BLD AUTO: 34.7 % (ref 37–48.5)
HGB BLD-MCNC: 11.6 G/DL (ref 12–16)
IMM GRANULOCYTES # BLD AUTO: 0.02 K/UL (ref 0–0.04)
IMM GRANULOCYTES NFR BLD AUTO: 0.3 % (ref 0–0.5)
LYMPHOCYTES # BLD AUTO: 2.9 K/UL (ref 1–4.8)
LYMPHOCYTES NFR BLD: 43 % (ref 18–48)
MAGNESIUM SERPL-MCNC: 1.5 MG/DL (ref 1.6–2.6)
MCH RBC QN AUTO: 30.6 PG (ref 27–31)
MCHC RBC AUTO-ENTMCNC: 33.4 G/DL (ref 32–36)
MCV RBC AUTO: 92 FL (ref 82–98)
MONOCYTES # BLD AUTO: 0.6 K/UL (ref 0.3–1)
MONOCYTES NFR BLD: 9.1 % (ref 4–15)
NEUTROPHILS # BLD AUTO: 2.9 K/UL (ref 1.8–7.7)
NEUTROPHILS NFR BLD: 41.9 % (ref 38–73)
NRBC BLD-RTO: 0 /100 WBC
PHOSPHATE SERPL-MCNC: 2.3 MG/DL (ref 2.7–4.5)
PLATELET # BLD AUTO: 177 K/UL (ref 150–450)
PMV BLD AUTO: 10.1 FL (ref 9.2–12.9)
POTASSIUM SERPL-SCNC: 3.4 MMOL/L (ref 3.5–5.1)
PROT SERPL-MCNC: 4.5 G/DL (ref 6–8.4)
RBC # BLD AUTO: 3.79 M/UL (ref 4–5.4)
SODIUM SERPL-SCNC: 138 MMOL/L (ref 136–145)
TB INDURATION 48 - 72 HR READ: 0 MM
WBC # BLD AUTO: 6.81 K/UL (ref 3.9–12.7)

## 2021-11-24 PROCEDURE — 80048 BASIC METABOLIC PNL TOTAL CA: CPT | Mod: HCNC | Performed by: STUDENT IN AN ORGANIZED HEALTH CARE EDUCATION/TRAINING PROGRAM

## 2021-11-24 PROCEDURE — C9113 INJ PANTOPRAZOLE SODIUM, VIA: HCPCS | Mod: HCNC | Performed by: STUDENT IN AN ORGANIZED HEALTH CARE EDUCATION/TRAINING PROGRAM

## 2021-11-24 PROCEDURE — 99232 PR SUBSEQUENT HOSPITAL CARE,LEVL II: ICD-10-PCS | Mod: HCNC,95,, | Performed by: INTERNAL MEDICINE

## 2021-11-24 PROCEDURE — 25000003 PHARM REV CODE 250: Mod: HCNC | Performed by: INTERNAL MEDICINE

## 2021-11-24 PROCEDURE — 97535 SELF CARE MNGMENT TRAINING: CPT | Mod: HCNC

## 2021-11-24 PROCEDURE — 99232 SBSQ HOSP IP/OBS MODERATE 35: CPT | Mod: HCNC,95,, | Performed by: INTERNAL MEDICINE

## 2021-11-24 PROCEDURE — 83735 ASSAY OF MAGNESIUM: CPT | Mod: HCNC | Performed by: INTERNAL MEDICINE

## 2021-11-24 PROCEDURE — 25000003 PHARM REV CODE 250: Mod: HCNC | Performed by: PHYSICIAN ASSISTANT

## 2021-11-24 PROCEDURE — 63600175 PHARM REV CODE 636 W HCPCS: Mod: HCNC | Performed by: INTERNAL MEDICINE

## 2021-11-24 PROCEDURE — 63600175 PHARM REV CODE 636 W HCPCS: Mod: HCNC | Performed by: STUDENT IN AN ORGANIZED HEALTH CARE EDUCATION/TRAINING PROGRAM

## 2021-11-24 PROCEDURE — 80076 HEPATIC FUNCTION PANEL: CPT | Mod: HCNC | Performed by: INTERNAL MEDICINE

## 2021-11-24 PROCEDURE — 97530 THERAPEUTIC ACTIVITIES: CPT | Mod: HCNC,CQ

## 2021-11-24 PROCEDURE — 36415 COLL VENOUS BLD VENIPUNCTURE: CPT | Mod: HCNC | Performed by: STUDENT IN AN ORGANIZED HEALTH CARE EDUCATION/TRAINING PROGRAM

## 2021-11-24 PROCEDURE — 97110 THERAPEUTIC EXERCISES: CPT | Mod: HCNC,CQ

## 2021-11-24 PROCEDURE — 85025 COMPLETE CBC W/AUTO DIFF WBC: CPT | Mod: HCNC | Performed by: STUDENT IN AN ORGANIZED HEALTH CARE EDUCATION/TRAINING PROGRAM

## 2021-11-24 PROCEDURE — 97530 THERAPEUTIC ACTIVITIES: CPT | Mod: HCNC

## 2021-11-24 PROCEDURE — 84100 ASSAY OF PHOSPHORUS: CPT | Mod: HCNC | Performed by: INTERNAL MEDICINE

## 2021-11-24 PROCEDURE — 12000002 HC ACUTE/MED SURGE SEMI-PRIVATE ROOM: Mod: HCNC

## 2021-11-24 RX ORDER — ENOXAPARIN SODIUM 100 MG/ML
40 INJECTION SUBCUTANEOUS EVERY 24 HOURS
Status: DISCONTINUED | OUTPATIENT
Start: 2021-11-24 | End: 2021-11-29

## 2021-11-24 RX ORDER — MAGNESIUM SULFATE HEPTAHYDRATE 40 MG/ML
2 INJECTION, SOLUTION INTRAVENOUS ONCE
Status: COMPLETED | OUTPATIENT
Start: 2021-11-24 | End: 2021-11-24

## 2021-11-24 RX ORDER — POTASSIUM CHLORIDE 20 MEQ/1
40 TABLET, EXTENDED RELEASE ORAL ONCE
Status: COMPLETED | OUTPATIENT
Start: 2021-11-24 | End: 2021-11-24

## 2021-11-24 RX ORDER — SODIUM,POTASSIUM PHOSPHATES 280-250MG
1 POWDER IN PACKET (EA) ORAL EVERY 4 HOURS
Status: COMPLETED | OUTPATIENT
Start: 2021-11-24 | End: 2021-11-24

## 2021-11-24 RX ADMIN — POTASSIUM & SODIUM PHOSPHATES POWDER PACK 280-160-250 MG 1 PACKET: 280-160-250 PACK at 02:11

## 2021-11-24 RX ADMIN — PANTOPRAZOLE SODIUM 40 MG: 40 INJECTION, POWDER, FOR SOLUTION INTRAVENOUS at 08:11

## 2021-11-24 RX ADMIN — MICONAZOLE NITRATE: 20 POWDER TOPICAL at 08:11

## 2021-11-24 RX ADMIN — ENOXAPARIN SODIUM 40 MG: 40 INJECTION SUBCUTANEOUS at 06:11

## 2021-11-24 RX ADMIN — POTASSIUM CHLORIDE 40 MEQ: 1500 TABLET, EXTENDED RELEASE ORAL at 02:11

## 2021-11-24 RX ADMIN — Medication 6 MG: at 08:11

## 2021-11-24 RX ADMIN — POTASSIUM & SODIUM PHOSPHATES POWDER PACK 280-160-250 MG 1 PACKET: 280-160-250 PACK at 06:11

## 2021-11-24 RX ADMIN — MAGNESIUM SULFATE IN WATER 2 G: 40 INJECTION, SOLUTION INTRAVENOUS at 02:11

## 2021-11-25 PROBLEM — D62 ACUTE BLOOD LOSS ANEMIA: Status: ACTIVE | Noted: 2021-11-25

## 2021-11-25 LAB
ANION GAP SERPL CALC-SCNC: 7 MMOL/L (ref 8–16)
BASOPHILS # BLD AUTO: 0.03 K/UL (ref 0–0.2)
BASOPHILS NFR BLD: 0.4 % (ref 0–1.9)
BUN SERPL-MCNC: 8 MG/DL (ref 8–23)
CALCIUM SERPL-MCNC: 7.5 MG/DL (ref 8.7–10.5)
CHLORIDE SERPL-SCNC: 109 MMOL/L (ref 95–110)
CO2 SERPL-SCNC: 23 MMOL/L (ref 23–29)
CREAT SERPL-MCNC: 0.7 MG/DL (ref 0.5–1.4)
DIFFERENTIAL METHOD: ABNORMAL
EOSINOPHIL # BLD AUTO: 0.4 K/UL (ref 0–0.5)
EOSINOPHIL NFR BLD: 5.7 % (ref 0–8)
ERYTHROCYTE [DISTWIDTH] IN BLOOD BY AUTOMATED COUNT: 15.6 % (ref 11.5–14.5)
EST. GFR  (AFRICAN AMERICAN): >60 ML/MIN/1.73 M^2
EST. GFR  (NON AFRICAN AMERICAN): >60 ML/MIN/1.73 M^2
GLUCOSE SERPL-MCNC: 75 MG/DL (ref 70–110)
HCT VFR BLD AUTO: 36 % (ref 37–48.5)
HGB BLD-MCNC: 11.7 G/DL (ref 12–16)
IMM GRANULOCYTES # BLD AUTO: 0.03 K/UL (ref 0–0.04)
IMM GRANULOCYTES NFR BLD AUTO: 0.4 % (ref 0–0.5)
LYMPHOCYTES # BLD AUTO: 2.1 K/UL (ref 1–4.8)
LYMPHOCYTES NFR BLD: 30.1 % (ref 18–48)
MAGNESIUM SERPL-MCNC: 2 MG/DL (ref 1.6–2.6)
MCH RBC QN AUTO: 29.8 PG (ref 27–31)
MCHC RBC AUTO-ENTMCNC: 32.5 G/DL (ref 32–36)
MCV RBC AUTO: 92 FL (ref 82–98)
MONOCYTES # BLD AUTO: 0.7 K/UL (ref 0.3–1)
MONOCYTES NFR BLD: 10 % (ref 4–15)
NEUTROPHILS # BLD AUTO: 3.7 K/UL (ref 1.8–7.7)
NEUTROPHILS NFR BLD: 53.4 % (ref 38–73)
NRBC BLD-RTO: 0 /100 WBC
PHOSPHATE SERPL-MCNC: 2.5 MG/DL (ref 2.7–4.5)
PLATELET # BLD AUTO: 173 K/UL (ref 150–450)
PMV BLD AUTO: 10.6 FL (ref 9.2–12.9)
POTASSIUM SERPL-SCNC: 4.5 MMOL/L (ref 3.5–5.1)
RBC # BLD AUTO: 3.92 M/UL (ref 4–5.4)
SODIUM SERPL-SCNC: 139 MMOL/L (ref 136–145)
WBC # BLD AUTO: 6.88 K/UL (ref 3.9–12.7)

## 2021-11-25 PROCEDURE — 94761 N-INVAS EAR/PLS OXIMETRY MLT: CPT | Mod: HCNC

## 2021-11-25 PROCEDURE — 36415 COLL VENOUS BLD VENIPUNCTURE: CPT | Mod: HCNC | Performed by: STUDENT IN AN ORGANIZED HEALTH CARE EDUCATION/TRAINING PROGRAM

## 2021-11-25 PROCEDURE — 85025 COMPLETE CBC W/AUTO DIFF WBC: CPT | Mod: HCNC | Performed by: STUDENT IN AN ORGANIZED HEALTH CARE EDUCATION/TRAINING PROGRAM

## 2021-11-25 PROCEDURE — 12000002 HC ACUTE/MED SURGE SEMI-PRIVATE ROOM: Mod: HCNC

## 2021-11-25 PROCEDURE — 97530 THERAPEUTIC ACTIVITIES: CPT | Mod: HCNC

## 2021-11-25 PROCEDURE — 84100 ASSAY OF PHOSPHORUS: CPT | Mod: HCNC | Performed by: INTERNAL MEDICINE

## 2021-11-25 PROCEDURE — 83735 ASSAY OF MAGNESIUM: CPT | Mod: HCNC | Performed by: INTERNAL MEDICINE

## 2021-11-25 PROCEDURE — 99233 PR SUBSEQUENT HOSPITAL CARE,LEVL III: ICD-10-PCS | Mod: HCNC,95,, | Performed by: INTERNAL MEDICINE

## 2021-11-25 PROCEDURE — 63600175 PHARM REV CODE 636 W HCPCS: Mod: HCNC | Performed by: STUDENT IN AN ORGANIZED HEALTH CARE EDUCATION/TRAINING PROGRAM

## 2021-11-25 PROCEDURE — 99233 SBSQ HOSP IP/OBS HIGH 50: CPT | Mod: HCNC,95,, | Performed by: INTERNAL MEDICINE

## 2021-11-25 PROCEDURE — 63600175 PHARM REV CODE 636 W HCPCS: Mod: HCNC | Performed by: INTERNAL MEDICINE

## 2021-11-25 PROCEDURE — C9113 INJ PANTOPRAZOLE SODIUM, VIA: HCPCS | Mod: HCNC | Performed by: STUDENT IN AN ORGANIZED HEALTH CARE EDUCATION/TRAINING PROGRAM

## 2021-11-25 PROCEDURE — 80048 BASIC METABOLIC PNL TOTAL CA: CPT | Mod: HCNC | Performed by: STUDENT IN AN ORGANIZED HEALTH CARE EDUCATION/TRAINING PROGRAM

## 2021-11-25 RX ADMIN — PANTOPRAZOLE SODIUM 40 MG: 40 INJECTION, POWDER, FOR SOLUTION INTRAVENOUS at 08:11

## 2021-11-25 RX ADMIN — MICONAZOLE NITRATE: 20 POWDER TOPICAL at 08:11

## 2021-11-25 RX ADMIN — ENOXAPARIN SODIUM 40 MG: 40 INJECTION SUBCUTANEOUS at 04:11

## 2021-11-26 LAB
ANION GAP SERPL CALC-SCNC: 5 MMOL/L (ref 8–16)
BASOPHILS # BLD AUTO: 0.04 K/UL (ref 0–0.2)
BASOPHILS NFR BLD: 0.6 % (ref 0–1.9)
BUN SERPL-MCNC: 9 MG/DL (ref 8–23)
CALCIUM SERPL-MCNC: 7.4 MG/DL (ref 8.7–10.5)
CHLORIDE SERPL-SCNC: 106 MMOL/L (ref 95–110)
CO2 SERPL-SCNC: 24 MMOL/L (ref 23–29)
CREAT SERPL-MCNC: 0.7 MG/DL (ref 0.5–1.4)
DIFFERENTIAL METHOD: ABNORMAL
EOSINOPHIL # BLD AUTO: 0.4 K/UL (ref 0–0.5)
EOSINOPHIL NFR BLD: 6.5 % (ref 0–8)
ERYTHROCYTE [DISTWIDTH] IN BLOOD BY AUTOMATED COUNT: 15.8 % (ref 11.5–14.5)
EST. GFR  (AFRICAN AMERICAN): >60 ML/MIN/1.73 M^2
EST. GFR  (NON AFRICAN AMERICAN): >60 ML/MIN/1.73 M^2
GLUCOSE SERPL-MCNC: 69 MG/DL (ref 70–110)
HCT VFR BLD AUTO: 33.1 % (ref 37–48.5)
HGB BLD-MCNC: 10.8 G/DL (ref 12–16)
IMM GRANULOCYTES # BLD AUTO: 0.03 K/UL (ref 0–0.04)
IMM GRANULOCYTES NFR BLD AUTO: 0.5 % (ref 0–0.5)
LYMPHOCYTES # BLD AUTO: 2.3 K/UL (ref 1–4.8)
LYMPHOCYTES NFR BLD: 36.3 % (ref 18–48)
MAGNESIUM SERPL-MCNC: 1.9 MG/DL (ref 1.6–2.6)
MCH RBC QN AUTO: 30.2 PG (ref 27–31)
MCHC RBC AUTO-ENTMCNC: 32.6 G/DL (ref 32–36)
MCV RBC AUTO: 93 FL (ref 82–98)
MONOCYTES # BLD AUTO: 0.5 K/UL (ref 0.3–1)
MONOCYTES NFR BLD: 8.4 % (ref 4–15)
NEUTROPHILS # BLD AUTO: 3 K/UL (ref 1.8–7.7)
NEUTROPHILS NFR BLD: 47.7 % (ref 38–73)
NRBC BLD-RTO: 0 /100 WBC
PHOSPHATE SERPL-MCNC: 2.2 MG/DL (ref 2.7–4.5)
PLATELET # BLD AUTO: 147 K/UL (ref 150–450)
PMV BLD AUTO: 10.4 FL (ref 9.2–12.9)
POTASSIUM SERPL-SCNC: 4.8 MMOL/L (ref 3.5–5.1)
RBC # BLD AUTO: 3.58 M/UL (ref 4–5.4)
SODIUM SERPL-SCNC: 135 MMOL/L (ref 136–145)
WBC # BLD AUTO: 6.33 K/UL (ref 3.9–12.7)

## 2021-11-26 PROCEDURE — 99233 SBSQ HOSP IP/OBS HIGH 50: CPT | Mod: HCNC,95,, | Performed by: INTERNAL MEDICINE

## 2021-11-26 PROCEDURE — 12000002 HC ACUTE/MED SURGE SEMI-PRIVATE ROOM: Mod: HCNC

## 2021-11-26 PROCEDURE — 80048 BASIC METABOLIC PNL TOTAL CA: CPT | Mod: HCNC | Performed by: STUDENT IN AN ORGANIZED HEALTH CARE EDUCATION/TRAINING PROGRAM

## 2021-11-26 PROCEDURE — 84100 ASSAY OF PHOSPHORUS: CPT | Mod: HCNC | Performed by: INTERNAL MEDICINE

## 2021-11-26 PROCEDURE — 99233 PR SUBSEQUENT HOSPITAL CARE,LEVL III: ICD-10-PCS | Mod: HCNC,95,, | Performed by: INTERNAL MEDICINE

## 2021-11-26 PROCEDURE — 83735 ASSAY OF MAGNESIUM: CPT | Mod: HCNC | Performed by: INTERNAL MEDICINE

## 2021-11-26 PROCEDURE — 94761 N-INVAS EAR/PLS OXIMETRY MLT: CPT | Mod: HCNC

## 2021-11-26 PROCEDURE — 85025 COMPLETE CBC W/AUTO DIFF WBC: CPT | Mod: HCNC | Performed by: STUDENT IN AN ORGANIZED HEALTH CARE EDUCATION/TRAINING PROGRAM

## 2021-11-26 PROCEDURE — C9113 INJ PANTOPRAZOLE SODIUM, VIA: HCPCS | Mod: HCNC | Performed by: STUDENT IN AN ORGANIZED HEALTH CARE EDUCATION/TRAINING PROGRAM

## 2021-11-26 PROCEDURE — 63600175 PHARM REV CODE 636 W HCPCS: Mod: HCNC | Performed by: STUDENT IN AN ORGANIZED HEALTH CARE EDUCATION/TRAINING PROGRAM

## 2021-11-26 PROCEDURE — 94760 N-INVAS EAR/PLS OXIMETRY 1: CPT | Mod: HCNC

## 2021-11-26 PROCEDURE — 63600175 PHARM REV CODE 636 W HCPCS: Mod: HCNC | Performed by: INTERNAL MEDICINE

## 2021-11-26 PROCEDURE — 97530 THERAPEUTIC ACTIVITIES: CPT | Mod: HCNC

## 2021-11-26 RX ADMIN — MICONAZOLE NITRATE: 20 POWDER TOPICAL at 08:11

## 2021-11-26 RX ADMIN — PANTOPRAZOLE SODIUM 40 MG: 40 INJECTION, POWDER, FOR SOLUTION INTRAVENOUS at 08:11

## 2021-11-26 RX ADMIN — ENOXAPARIN SODIUM 40 MG: 40 INJECTION SUBCUTANEOUS at 04:11

## 2021-11-26 RX ADMIN — MICONAZOLE NITRATE: 20 POWDER TOPICAL at 09:11

## 2021-11-27 LAB
ANION GAP SERPL CALC-SCNC: 8 MMOL/L (ref 8–16)
BASOPHILS # BLD AUTO: 0.03 K/UL (ref 0–0.2)
BASOPHILS NFR BLD: 0.4 % (ref 0–1.9)
BUN SERPL-MCNC: 10 MG/DL (ref 8–23)
CALCIUM SERPL-MCNC: 7.8 MG/DL (ref 8.7–10.5)
CHLORIDE SERPL-SCNC: 104 MMOL/L (ref 95–110)
CO2 SERPL-SCNC: 24 MMOL/L (ref 23–29)
CREAT SERPL-MCNC: 0.8 MG/DL (ref 0.5–1.4)
DIFFERENTIAL METHOD: ABNORMAL
EOSINOPHIL # BLD AUTO: 0.4 K/UL (ref 0–0.5)
EOSINOPHIL NFR BLD: 5.7 % (ref 0–8)
ERYTHROCYTE [DISTWIDTH] IN BLOOD BY AUTOMATED COUNT: 15.9 % (ref 11.5–14.5)
EST. GFR  (AFRICAN AMERICAN): >60 ML/MIN/1.73 M^2
EST. GFR  (NON AFRICAN AMERICAN): >60 ML/MIN/1.73 M^2
GLUCOSE SERPL-MCNC: 70 MG/DL (ref 70–110)
HCT VFR BLD AUTO: 34.3 % (ref 37–48.5)
HGB BLD-MCNC: 11.2 G/DL (ref 12–16)
IMM GRANULOCYTES # BLD AUTO: 0.04 K/UL (ref 0–0.04)
IMM GRANULOCYTES NFR BLD AUTO: 0.6 % (ref 0–0.5)
LYMPHOCYTES # BLD AUTO: 2.4 K/UL (ref 1–4.8)
LYMPHOCYTES NFR BLD: 34.6 % (ref 18–48)
MAGNESIUM SERPL-MCNC: 1.8 MG/DL (ref 1.6–2.6)
MCH RBC QN AUTO: 29.9 PG (ref 27–31)
MCHC RBC AUTO-ENTMCNC: 32.7 G/DL (ref 32–36)
MCV RBC AUTO: 92 FL (ref 82–98)
MONOCYTES # BLD AUTO: 0.7 K/UL (ref 0.3–1)
MONOCYTES NFR BLD: 9.3 % (ref 4–15)
NEUTROPHILS # BLD AUTO: 3.5 K/UL (ref 1.8–7.7)
NEUTROPHILS NFR BLD: 49.4 % (ref 38–73)
NRBC BLD-RTO: 0 /100 WBC
PHOSPHATE SERPL-MCNC: 2.4 MG/DL (ref 2.7–4.5)
PLATELET # BLD AUTO: 160 K/UL (ref 150–450)
PMV BLD AUTO: 10.3 FL (ref 9.2–12.9)
POTASSIUM SERPL-SCNC: 4.6 MMOL/L (ref 3.5–5.1)
RBC # BLD AUTO: 3.75 M/UL (ref 4–5.4)
SODIUM SERPL-SCNC: 136 MMOL/L (ref 136–145)
WBC # BLD AUTO: 6.99 K/UL (ref 3.9–12.7)

## 2021-11-27 PROCEDURE — 63600175 PHARM REV CODE 636 W HCPCS: Mod: HCNC | Performed by: INTERNAL MEDICINE

## 2021-11-27 PROCEDURE — 36415 COLL VENOUS BLD VENIPUNCTURE: CPT | Mod: HCNC | Performed by: STUDENT IN AN ORGANIZED HEALTH CARE EDUCATION/TRAINING PROGRAM

## 2021-11-27 PROCEDURE — 99233 PR SUBSEQUENT HOSPITAL CARE,LEVL III: ICD-10-PCS | Mod: HCNC,95,, | Performed by: INTERNAL MEDICINE

## 2021-11-27 PROCEDURE — 84100 ASSAY OF PHOSPHORUS: CPT | Mod: HCNC | Performed by: INTERNAL MEDICINE

## 2021-11-27 PROCEDURE — 85025 COMPLETE CBC W/AUTO DIFF WBC: CPT | Mod: HCNC | Performed by: STUDENT IN AN ORGANIZED HEALTH CARE EDUCATION/TRAINING PROGRAM

## 2021-11-27 PROCEDURE — 63600175 PHARM REV CODE 636 W HCPCS: Mod: HCNC | Performed by: STUDENT IN AN ORGANIZED HEALTH CARE EDUCATION/TRAINING PROGRAM

## 2021-11-27 PROCEDURE — 83735 ASSAY OF MAGNESIUM: CPT | Mod: HCNC | Performed by: INTERNAL MEDICINE

## 2021-11-27 PROCEDURE — 80048 BASIC METABOLIC PNL TOTAL CA: CPT | Mod: HCNC | Performed by: STUDENT IN AN ORGANIZED HEALTH CARE EDUCATION/TRAINING PROGRAM

## 2021-11-27 PROCEDURE — 94761 N-INVAS EAR/PLS OXIMETRY MLT: CPT | Mod: HCNC

## 2021-11-27 PROCEDURE — 99233 SBSQ HOSP IP/OBS HIGH 50: CPT | Mod: HCNC,95,, | Performed by: INTERNAL MEDICINE

## 2021-11-27 PROCEDURE — 12000002 HC ACUTE/MED SURGE SEMI-PRIVATE ROOM: Mod: HCNC

## 2021-11-27 PROCEDURE — 25000003 PHARM REV CODE 250: Mod: HCNC | Performed by: INTERNAL MEDICINE

## 2021-11-27 PROCEDURE — C9113 INJ PANTOPRAZOLE SODIUM, VIA: HCPCS | Mod: HCNC | Performed by: STUDENT IN AN ORGANIZED HEALTH CARE EDUCATION/TRAINING PROGRAM

## 2021-11-27 RX ORDER — SODIUM,POTASSIUM PHOSPHATES 280-250MG
2 POWDER IN PACKET (EA) ORAL 2 TIMES DAILY WITH MEALS
Status: DISPENSED | OUTPATIENT
Start: 2021-11-27 | End: 2021-11-29

## 2021-11-27 RX ADMIN — ENOXAPARIN SODIUM 40 MG: 40 INJECTION SUBCUTANEOUS at 04:11

## 2021-11-27 RX ADMIN — POTASSIUM & SODIUM PHOSPHATES POWDER PACK 280-160-250 MG 2 PACKET: 280-160-250 PACK at 04:11

## 2021-11-27 RX ADMIN — MICONAZOLE NITRATE: 20 POWDER TOPICAL at 08:11

## 2021-11-28 LAB
ANION GAP SERPL CALC-SCNC: 4 MMOL/L (ref 8–16)
BASOPHILS # BLD AUTO: 0.02 K/UL (ref 0–0.2)
BASOPHILS NFR BLD: 0.3 % (ref 0–1.9)
BUN SERPL-MCNC: 11 MG/DL (ref 8–23)
CALCIUM SERPL-MCNC: 7.8 MG/DL (ref 8.7–10.5)
CHLORIDE SERPL-SCNC: 106 MMOL/L (ref 95–110)
CO2 SERPL-SCNC: 27 MMOL/L (ref 23–29)
CREAT SERPL-MCNC: 0.7 MG/DL (ref 0.5–1.4)
DIFFERENTIAL METHOD: ABNORMAL
EOSINOPHIL # BLD AUTO: 0.3 K/UL (ref 0–0.5)
EOSINOPHIL NFR BLD: 4.3 % (ref 0–8)
ERYTHROCYTE [DISTWIDTH] IN BLOOD BY AUTOMATED COUNT: 15.7 % (ref 11.5–14.5)
EST. GFR  (AFRICAN AMERICAN): >60 ML/MIN/1.73 M^2
EST. GFR  (NON AFRICAN AMERICAN): >60 ML/MIN/1.73 M^2
GLUCOSE SERPL-MCNC: 71 MG/DL (ref 70–110)
HCT VFR BLD AUTO: 35.3 % (ref 37–48.5)
HGB BLD-MCNC: 11.3 G/DL (ref 12–16)
IMM GRANULOCYTES # BLD AUTO: 0.03 K/UL (ref 0–0.04)
IMM GRANULOCYTES NFR BLD AUTO: 0.5 % (ref 0–0.5)
LYMPHOCYTES # BLD AUTO: 2.3 K/UL (ref 1–4.8)
LYMPHOCYTES NFR BLD: 35.8 % (ref 18–48)
MAGNESIUM SERPL-MCNC: 1.8 MG/DL (ref 1.6–2.6)
MCH RBC QN AUTO: 30 PG (ref 27–31)
MCHC RBC AUTO-ENTMCNC: 32 G/DL (ref 32–36)
MCV RBC AUTO: 94 FL (ref 82–98)
MONOCYTES # BLD AUTO: 0.5 K/UL (ref 0.3–1)
MONOCYTES NFR BLD: 7.8 % (ref 4–15)
NEUTROPHILS # BLD AUTO: 3.4 K/UL (ref 1.8–7.7)
NEUTROPHILS NFR BLD: 51.3 % (ref 38–73)
NRBC BLD-RTO: 0 /100 WBC
PHOSPHATE SERPL-MCNC: 2.4 MG/DL (ref 2.7–4.5)
PLATELET # BLD AUTO: 193 K/UL (ref 150–450)
PMV BLD AUTO: 10.3 FL (ref 9.2–12.9)
POCT GLUCOSE: 83 MG/DL (ref 70–110)
POCT GLUCOSE: 94 MG/DL (ref 70–110)
POTASSIUM SERPL-SCNC: 4.4 MMOL/L (ref 3.5–5.1)
RBC # BLD AUTO: 3.77 M/UL (ref 4–5.4)
SODIUM SERPL-SCNC: 137 MMOL/L (ref 136–145)
WBC # BLD AUTO: 6.53 K/UL (ref 3.9–12.7)

## 2021-11-28 PROCEDURE — 84100 ASSAY OF PHOSPHORUS: CPT | Mod: HCNC | Performed by: INTERNAL MEDICINE

## 2021-11-28 PROCEDURE — 99233 SBSQ HOSP IP/OBS HIGH 50: CPT | Mod: HCNC,95,, | Performed by: INTERNAL MEDICINE

## 2021-11-28 PROCEDURE — 25000003 PHARM REV CODE 250: Mod: HCNC | Performed by: STUDENT IN AN ORGANIZED HEALTH CARE EDUCATION/TRAINING PROGRAM

## 2021-11-28 PROCEDURE — 63600175 PHARM REV CODE 636 W HCPCS: Mod: HCNC | Performed by: INTERNAL MEDICINE

## 2021-11-28 PROCEDURE — 36415 COLL VENOUS BLD VENIPUNCTURE: CPT | Mod: HCNC | Performed by: STUDENT IN AN ORGANIZED HEALTH CARE EDUCATION/TRAINING PROGRAM

## 2021-11-28 PROCEDURE — 94761 N-INVAS EAR/PLS OXIMETRY MLT: CPT | Mod: HCNC

## 2021-11-28 PROCEDURE — 80048 BASIC METABOLIC PNL TOTAL CA: CPT | Mod: HCNC | Performed by: STUDENT IN AN ORGANIZED HEALTH CARE EDUCATION/TRAINING PROGRAM

## 2021-11-28 PROCEDURE — 99233 PR SUBSEQUENT HOSPITAL CARE,LEVL III: ICD-10-PCS | Mod: HCNC,95,, | Performed by: INTERNAL MEDICINE

## 2021-11-28 PROCEDURE — 83735 ASSAY OF MAGNESIUM: CPT | Mod: HCNC | Performed by: INTERNAL MEDICINE

## 2021-11-28 PROCEDURE — 12000002 HC ACUTE/MED SURGE SEMI-PRIVATE ROOM: Mod: HCNC

## 2021-11-28 PROCEDURE — 99900035 HC TECH TIME PER 15 MIN (STAT): Mod: HCNC

## 2021-11-28 PROCEDURE — 85025 COMPLETE CBC W/AUTO DIFF WBC: CPT | Mod: HCNC | Performed by: STUDENT IN AN ORGANIZED HEALTH CARE EDUCATION/TRAINING PROGRAM

## 2021-11-28 PROCEDURE — 25000003 PHARM REV CODE 250: Mod: HCNC | Performed by: INTERNAL MEDICINE

## 2021-11-28 RX ORDER — CHOLECALCIFEROL (VITAMIN D3) 25 MCG
1000 TABLET ORAL DAILY
Status: DISCONTINUED | OUTPATIENT
Start: 2021-11-28 | End: 2021-11-30 | Stop reason: HOSPADM

## 2021-11-28 RX ORDER — PANTOPRAZOLE SODIUM 40 MG/1
40 TABLET, DELAYED RELEASE ORAL 2 TIMES DAILY
Qty: 60 TABLET | Refills: 11 | Status: CANCELLED | OUTPATIENT
Start: 2021-11-28 | End: 2022-11-28

## 2021-11-28 RX ADMIN — Medication 1000 UNITS: at 02:11

## 2021-11-28 RX ADMIN — ENOXAPARIN SODIUM 40 MG: 40 INJECTION SUBCUTANEOUS at 05:11

## 2021-11-28 RX ADMIN — MICONAZOLE NITRATE: 20 POWDER TOPICAL at 09:11

## 2021-11-28 RX ADMIN — POTASSIUM & SODIUM PHOSPHATES POWDER PACK 280-160-250 MG 2 PACKET: 280-160-250 PACK at 05:11

## 2021-11-28 RX ADMIN — POTASSIUM & SODIUM PHOSPHATES POWDER PACK 280-160-250 MG 2 PACKET: 280-160-250 PACK at 11:11

## 2021-11-28 RX ADMIN — MULTIPLE VITAMINS W/ MINERALS TAB 1 TABLET: TAB at 02:11

## 2021-11-28 RX ADMIN — MICONAZOLE NITRATE: 20 POWDER TOPICAL at 11:11

## 2021-11-29 VITALS
OXYGEN SATURATION: 96 % | BODY MASS INDEX: 30.14 KG/M2 | RESPIRATION RATE: 18 BRPM | DIASTOLIC BLOOD PRESSURE: 52 MMHG | SYSTOLIC BLOOD PRESSURE: 100 MMHG | HEART RATE: 80 BPM | TEMPERATURE: 97 F | HEIGHT: 64 IN | WEIGHT: 176.56 LBS

## 2021-11-29 LAB
ANION GAP SERPL CALC-SCNC: 7 MMOL/L (ref 8–16)
ANISOCYTOSIS BLD QL SMEAR: SLIGHT
BASOPHILS # BLD AUTO: 0.03 K/UL (ref 0–0.2)
BASOPHILS NFR BLD: 0.4 % (ref 0–1.9)
BUN SERPL-MCNC: 11 MG/DL (ref 8–23)
CALCIUM SERPL-MCNC: 7.6 MG/DL (ref 8.7–10.5)
CHLORIDE SERPL-SCNC: 106 MMOL/L (ref 95–110)
CO2 SERPL-SCNC: 24 MMOL/L (ref 23–29)
CREAT SERPL-MCNC: 0.7 MG/DL (ref 0.5–1.4)
DIFFERENTIAL METHOD: ABNORMAL
EOSINOPHIL # BLD AUTO: 0.3 K/UL (ref 0–0.5)
EOSINOPHIL NFR BLD: 4.7 % (ref 0–8)
ERYTHROCYTE [DISTWIDTH] IN BLOOD BY AUTOMATED COUNT: 15.9 % (ref 11.5–14.5)
EST. GFR  (AFRICAN AMERICAN): >60 ML/MIN/1.73 M^2
EST. GFR  (NON AFRICAN AMERICAN): >60 ML/MIN/1.73 M^2
GLUCOSE SERPL-MCNC: 67 MG/DL (ref 70–110)
HCT VFR BLD AUTO: 34.9 % (ref 37–48.5)
HGB BLD-MCNC: 11.5 G/DL (ref 12–16)
HYPOCHROMIA BLD QL SMEAR: ABNORMAL
IMM GRANULOCYTES # BLD AUTO: 0.04 K/UL (ref 0–0.04)
IMM GRANULOCYTES NFR BLD AUTO: 0.6 % (ref 0–0.5)
LYMPHOCYTES # BLD AUTO: 2.9 K/UL (ref 1–4.8)
LYMPHOCYTES NFR BLD: 40.6 % (ref 18–48)
MAGNESIUM SERPL-MCNC: 1.8 MG/DL (ref 1.6–2.6)
MCH RBC QN AUTO: 30.4 PG (ref 27–31)
MCHC RBC AUTO-ENTMCNC: 33 G/DL (ref 32–36)
MCV RBC AUTO: 92 FL (ref 82–98)
MONOCYTES # BLD AUTO: 0.5 K/UL (ref 0.3–1)
MONOCYTES NFR BLD: 6.5 % (ref 4–15)
NEUTROPHILS # BLD AUTO: 3.3 K/UL (ref 1.8–7.7)
NEUTROPHILS NFR BLD: 47.2 % (ref 38–73)
NRBC BLD-RTO: 0 /100 WBC
OVALOCYTES BLD QL SMEAR: ABNORMAL
PHOSPHATE SERPL-MCNC: 2.7 MG/DL (ref 2.7–4.5)
PLATELET # BLD AUTO: 172 K/UL (ref 150–450)
PMV BLD AUTO: ABNORMAL FL (ref 9.2–12.9)
POCT GLUCOSE: 104 MG/DL (ref 70–110)
POCT GLUCOSE: 115 MG/DL (ref 70–110)
POCT GLUCOSE: 142 MG/DL (ref 70–110)
POIKILOCYTOSIS BLD QL SMEAR: SLIGHT
POLYCHROMASIA BLD QL SMEAR: ABNORMAL
POTASSIUM SERPL-SCNC: 4.4 MMOL/L (ref 3.5–5.1)
RBC # BLD AUTO: 3.78 M/UL (ref 4–5.4)
SARS-COV-2 RNA RESP QL NAA+PROBE: NOT DETECTED
SODIUM SERPL-SCNC: 137 MMOL/L (ref 136–145)
WBC # BLD AUTO: 7.07 K/UL (ref 3.9–12.7)

## 2021-11-29 PROCEDURE — 25000003 PHARM REV CODE 250: Mod: HCNC | Performed by: INTERNAL MEDICINE

## 2021-11-29 PROCEDURE — 94761 N-INVAS EAR/PLS OXIMETRY MLT: CPT | Mod: HCNC

## 2021-11-29 PROCEDURE — 99239 HOSP IP/OBS DSCHRG MGMT >30: CPT | Mod: HCNC,,, | Performed by: INTERNAL MEDICINE

## 2021-11-29 PROCEDURE — U0005 INFEC AGEN DETEC AMPLI PROBE: HCPCS | Performed by: INTERNAL MEDICINE

## 2021-11-29 PROCEDURE — 83735 ASSAY OF MAGNESIUM: CPT | Mod: HCNC | Performed by: INTERNAL MEDICINE

## 2021-11-29 PROCEDURE — 99239 PR HOSPITAL DISCHARGE DAY,>30 MIN: ICD-10-PCS | Mod: HCNC,,, | Performed by: INTERNAL MEDICINE

## 2021-11-29 PROCEDURE — 36415 COLL VENOUS BLD VENIPUNCTURE: CPT | Mod: HCNC | Performed by: STUDENT IN AN ORGANIZED HEALTH CARE EDUCATION/TRAINING PROGRAM

## 2021-11-29 PROCEDURE — U0003 INFECTIOUS AGENT DETECTION BY NUCLEIC ACID (DNA OR RNA); SEVERE ACUTE RESPIRATORY SYNDROME CORONAVIRUS 2 (SARS-COV-2) (CORONAVIRUS DISEASE [COVID-19]), AMPLIFIED PROBE TECHNIQUE, MAKING USE OF HIGH THROUGHPUT TECHNOLOGIES AS DESCRIBED BY CMS-2020-01-R: HCPCS | Mod: HCNC | Performed by: INTERNAL MEDICINE

## 2021-11-29 PROCEDURE — 85025 COMPLETE CBC W/AUTO DIFF WBC: CPT | Mod: HCNC | Performed by: STUDENT IN AN ORGANIZED HEALTH CARE EDUCATION/TRAINING PROGRAM

## 2021-11-29 PROCEDURE — 84100 ASSAY OF PHOSPHORUS: CPT | Mod: HCNC | Performed by: INTERNAL MEDICINE

## 2021-11-29 PROCEDURE — 80048 BASIC METABOLIC PNL TOTAL CA: CPT | Mod: HCNC | Performed by: STUDENT IN AN ORGANIZED HEALTH CARE EDUCATION/TRAINING PROGRAM

## 2021-11-29 RX ORDER — MICONAZOLE NITRATE 2 %
POWDER (GRAM) TOPICAL 2 TIMES DAILY
Refills: 0 | Status: ON HOLD
Start: 2021-11-29 | End: 2023-11-28

## 2021-11-29 RX ORDER — PANTOPRAZOLE SODIUM 40 MG/1
40 TABLET, DELAYED RELEASE ORAL 2 TIMES DAILY
Qty: 60 TABLET | Refills: 2 | Status: ON HOLD
Start: 2021-11-29 | End: 2023-11-28

## 2021-11-29 RX ADMIN — Medication 1000 UNITS: at 08:11

## 2021-11-29 RX ADMIN — MULTIPLE VITAMINS W/ MINERALS TAB 1 TABLET: TAB at 08:11

## 2021-11-29 RX ADMIN — MICONAZOLE NITRATE: 20 POWDER TOPICAL at 08:11

## 2021-11-29 RX ADMIN — MICONAZOLE NITRATE: 20 POWDER TOPICAL at 09:11

## 2021-11-30 ENCOUNTER — EXTERNAL HOSPITAL ADMISSION (OUTPATIENT)
Dept: SKILLED NURSING FACILITY | Facility: HOSPITAL | Age: 81
End: 2021-11-30
Payer: MEDICARE

## 2021-11-30 DIAGNOSIS — I82.4Y1 ACUTE DEEP VEIN THROMBOSIS (DVT) OF PROXIMAL VEIN OF RIGHT LOWER EXTREMITY: Primary | ICD-10-CM

## 2021-11-30 PROCEDURE — 99305 1ST NF CARE MODERATE MDM 35: CPT | Mod: ,,, | Performed by: INTERNAL MEDICINE

## 2021-11-30 PROCEDURE — 99305 PR NURSING FACILITY CARE, INIT, MOD SEVERITY: ICD-10-PCS | Mod: ,,, | Performed by: INTERNAL MEDICINE

## 2021-12-01 LAB
FINAL PATHOLOGIC DIAGNOSIS: NORMAL
Lab: NORMAL

## 2021-12-02 ENCOUNTER — EXTERNAL HOSPITAL ADMISSION (OUTPATIENT)
Dept: SKILLED NURSING FACILITY | Facility: HOSPITAL | Age: 81
End: 2021-12-02
Payer: MEDICARE

## 2021-12-02 DIAGNOSIS — I82.4Y1 ACUTE DEEP VEIN THROMBOSIS (DVT) OF PROXIMAL VEIN OF RIGHT LOWER EXTREMITY: Primary | ICD-10-CM

## 2021-12-02 PROCEDURE — 99307 SBSQ NF CARE SF MDM 10: CPT | Mod: ,,, | Performed by: INTERNAL MEDICINE

## 2021-12-02 PROCEDURE — 99307 PR NURSING FAC CARE, SUBSEQ, IMPROVING: ICD-10-PCS | Mod: ,,, | Performed by: INTERNAL MEDICINE

## 2021-12-07 ENCOUNTER — EXTERNAL HOSPITAL ADMISSION (OUTPATIENT)
Dept: SKILLED NURSING FACILITY | Facility: HOSPITAL | Age: 81
End: 2021-12-07
Payer: MEDICARE

## 2021-12-07 DIAGNOSIS — I82.4Y1 ACUTE DEEP VEIN THROMBOSIS (DVT) OF PROXIMAL VEIN OF RIGHT LOWER EXTREMITY: Primary | ICD-10-CM

## 2021-12-07 PROCEDURE — 99307 SBSQ NF CARE SF MDM 10: CPT | Mod: ,,, | Performed by: INTERNAL MEDICINE

## 2021-12-07 PROCEDURE — 99307 PR NURSING FAC CARE, SUBSEQ, IMPROVING: ICD-10-PCS | Mod: ,,, | Performed by: INTERNAL MEDICINE

## 2021-12-09 ENCOUNTER — EXTERNAL HOSPITAL ADMISSION (OUTPATIENT)
Dept: SKILLED NURSING FACILITY | Facility: HOSPITAL | Age: 81
End: 2021-12-09
Payer: MEDICARE

## 2021-12-09 DIAGNOSIS — I82.4Y1 ACUTE DEEP VEIN THROMBOSIS (DVT) OF PROXIMAL VEIN OF RIGHT LOWER EXTREMITY: Primary | ICD-10-CM

## 2021-12-09 PROCEDURE — 99307 SBSQ NF CARE SF MDM 10: CPT | Mod: ,,, | Performed by: INTERNAL MEDICINE

## 2021-12-09 PROCEDURE — 99307 PR NURSING FAC CARE, SUBSEQ, IMPROVING: ICD-10-PCS | Mod: ,,, | Performed by: INTERNAL MEDICINE

## 2021-12-14 ENCOUNTER — EXTERNAL HOSPITAL ADMISSION (OUTPATIENT)
Dept: SKILLED NURSING FACILITY | Facility: HOSPITAL | Age: 81
End: 2021-12-14
Payer: MEDICARE

## 2021-12-14 DIAGNOSIS — I82.4Y1 ACUTE DEEP VEIN THROMBOSIS (DVT) OF PROXIMAL VEIN OF RIGHT LOWER EXTREMITY: Primary | ICD-10-CM

## 2021-12-14 PROCEDURE — 99307 PR NURSING FAC CARE, SUBSEQ, IMPROVING: ICD-10-PCS | Mod: ,,, | Performed by: INTERNAL MEDICINE

## 2021-12-14 PROCEDURE — 99307 SBSQ NF CARE SF MDM 10: CPT | Mod: ,,, | Performed by: INTERNAL MEDICINE

## 2021-12-30 ENCOUNTER — LAB VISIT (OUTPATIENT)
Dept: LAB | Facility: OTHER | Age: 81
End: 2021-12-30
Payer: MEDICARE

## 2021-12-30 DIAGNOSIS — Z20.822 ENCOUNTER FOR LABORATORY TESTING FOR COVID-19 VIRUS: ICD-10-CM

## 2021-12-30 PROCEDURE — U0003 INFECTIOUS AGENT DETECTION BY NUCLEIC ACID (DNA OR RNA); SEVERE ACUTE RESPIRATORY SYNDROME CORONAVIRUS 2 (SARS-COV-2) (CORONAVIRUS DISEASE [COVID-19]), AMPLIFIED PROBE TECHNIQUE, MAKING USE OF HIGH THROUGHPUT TECHNOLOGIES AS DESCRIBED BY CMS-2020-01-R: HCPCS | Mod: ST72,HCNC | Performed by: NURSE PRACTITIONER

## 2022-01-05 LAB
SARS-COV-2 RNA RESP QL NAA+PROBE: NOT DETECTED
SARS-COV-2- CYCLE NUMBER: NORMAL

## 2022-01-25 ENCOUNTER — PATIENT MESSAGE (OUTPATIENT)
Dept: ADMINISTRATIVE | Facility: HOSPITAL | Age: 82
End: 2022-01-25
Payer: MEDICARE

## 2022-03-16 ENCOUNTER — PATIENT MESSAGE (OUTPATIENT)
Dept: ADMINISTRATIVE | Facility: HOSPITAL | Age: 82
End: 2022-03-16
Payer: MEDICARE

## 2022-09-12 ENCOUNTER — PES CALL (OUTPATIENT)
Dept: ADMINISTRATIVE | Facility: CLINIC | Age: 82
End: 2022-09-12
Payer: MEDICARE

## 2023-09-14 ENCOUNTER — TELEPHONE (OUTPATIENT)
Dept: FAMILY MEDICINE | Facility: CLINIC | Age: 83
End: 2023-09-14
Payer: MEDICARE

## 2023-09-14 NOTE — TELEPHONE ENCOUNTER
Spoke to [facility].  Luis states that he has faxed a document several times, however, we have not received it.  Luis verified fax number.  I advised him that it was the correct fax number.  He said he would refax the document and follow up on it tomorrow.

## 2023-09-14 NOTE — TELEPHONE ENCOUNTER
----- Message from Jim Bess sent at 9/13/2023 12:09 PM CDT -----  Regarding: advice  Contact: CHRISTIANO SCHULER [[id number]]  Type: Needs Medical Advice  Who Called:  [facility]    Symptoms (please be specific):  na    How long has patient had these symptoms:  roberot    Pharmacy name and phone #:  na    Best Call Back Number: [phone], X [phone]    Additional Information: Checking on forms faxed several times. Please call to advise.

## 2023-11-27 ENCOUNTER — HOSPITAL ENCOUNTER (INPATIENT)
Facility: HOSPITAL | Age: 83
LOS: 11 days | Discharge: HOME | DRG: 378 | End: 2023-12-08
Attending: EMERGENCY MEDICINE | Admitting: HOSPITALIST
Payer: MEDICARE

## 2023-11-27 DIAGNOSIS — I82.411 ACUTE DEEP VEIN THROMBOSIS (DVT) OF FEMORAL VEIN OF RIGHT LOWER EXTREMITY: ICD-10-CM

## 2023-11-27 DIAGNOSIS — D64.9 SYMPTOMATIC ANEMIA: ICD-10-CM

## 2023-11-27 DIAGNOSIS — K92.2 LOWER GI BLEED: Primary | ICD-10-CM

## 2023-11-27 DIAGNOSIS — D62 ACUTE BLOOD LOSS ANEMIA: ICD-10-CM

## 2023-11-27 DIAGNOSIS — I35.8 AORTIC VALVE SCLEROSIS: ICD-10-CM

## 2023-11-27 DIAGNOSIS — R60.0 BILATERAL LOWER EXTREMITY EDEMA: ICD-10-CM

## 2023-11-27 DIAGNOSIS — R06.02 SHORTNESS OF BREATH: ICD-10-CM

## 2023-11-27 DIAGNOSIS — R07.9 CHEST PAIN: ICD-10-CM

## 2023-11-27 DIAGNOSIS — R06.02 SOB (SHORTNESS OF BREATH): ICD-10-CM

## 2023-11-27 PROBLEM — E87.6 HYPOKALEMIA: Status: ACTIVE | Noted: 2023-11-27

## 2023-11-27 LAB
ABO + RH BLD: NORMAL
ALBUMIN SERPL BCP-MCNC: 1.9 G/DL (ref 3.5–5.2)
ALP SERPL-CCNC: 96 U/L (ref 55–135)
ALT SERPL W/O P-5'-P-CCNC: 12 U/L (ref 10–44)
ANION GAP SERPL CALC-SCNC: 10 MMOL/L (ref 8–16)
ANISOCYTOSIS BLD QL SMEAR: ABNORMAL
ANISOCYTOSIS BLD QL SMEAR: SLIGHT
AST SERPL-CCNC: 19 U/L (ref 10–40)
BACTERIA #/AREA URNS AUTO: ABNORMAL /HPF
BASOPHILS # BLD AUTO: 0.03 K/UL (ref 0–0.2)
BASOPHILS # BLD AUTO: 0.07 K/UL (ref 0–0.2)
BASOPHILS NFR BLD: 0.3 % (ref 0–1.9)
BASOPHILS NFR BLD: 0.4 % (ref 0–1.9)
BILIRUB SERPL-MCNC: 0.3 MG/DL (ref 0.1–1)
BILIRUB UR QL STRIP: NEGATIVE
BLD GP AB SCN CELLS X3 SERPL QL: NORMAL
BLD PROD TYP BPU: NORMAL
BLD PROD TYP BPU: NORMAL
BLOOD UNIT EXPIRATION DATE: NORMAL
BLOOD UNIT EXPIRATION DATE: NORMAL
BLOOD UNIT TYPE CODE: 5100
BLOOD UNIT TYPE CODE: 5100
BLOOD UNIT TYPE: NORMAL
BLOOD UNIT TYPE: NORMAL
BNP SERPL-MCNC: 315 PG/ML (ref 0–99)
BUN SERPL-MCNC: 29 MG/DL (ref 8–23)
CALCIUM SERPL-MCNC: 8 MG/DL (ref 8.7–10.5)
CHLORIDE SERPL-SCNC: 104 MMOL/L (ref 95–110)
CLARITY UR REFRACT.AUTO: ABNORMAL
CO2 SERPL-SCNC: 22 MMOL/L (ref 23–29)
CODING SYSTEM: NORMAL
CODING SYSTEM: NORMAL
COLOR UR AUTO: ABNORMAL
CREAT SERPL-MCNC: 1 MG/DL (ref 0.5–1.4)
CROSSMATCH INTERPRETATION: NORMAL
CROSSMATCH INTERPRETATION: NORMAL
DIFFERENTIAL METHOD: ABNORMAL
DIFFERENTIAL METHOD: ABNORMAL
DISPENSE STATUS: NORMAL
DISPENSE STATUS: NORMAL
EOSINOPHIL # BLD AUTO: 0.2 K/UL (ref 0–0.5)
EOSINOPHIL # BLD AUTO: 0.2 K/UL (ref 0–0.5)
EOSINOPHIL NFR BLD: 0.8 % (ref 0–8)
EOSINOPHIL NFR BLD: 2.7 % (ref 0–8)
ERYTHROCYTE [DISTWIDTH] IN BLOOD BY AUTOMATED COUNT: 21.2 % (ref 11.5–14.5)
ERYTHROCYTE [DISTWIDTH] IN BLOOD BY AUTOMATED COUNT: ABNORMAL % (ref 11.5–14.5)
EST. GFR  (NO RACE VARIABLE): 55.9 ML/MIN/1.73 M^2
FERRITIN SERPL-MCNC: 13 NG/ML (ref 20–300)
FOLATE SERPL-MCNC: 7 NG/ML (ref 4–24)
GLUCOSE SERPL-MCNC: 101 MG/DL (ref 70–110)
GLUCOSE UR QL STRIP: NEGATIVE
HCT VFR BLD AUTO: 16.6 % (ref 37–48.5)
HCT VFR BLD AUTO: 25.9 % (ref 37–48.5)
HCV AB SERPL QL IA: NORMAL
HGB BLD-MCNC: 4.3 G/DL (ref 12–16)
HGB BLD-MCNC: 7.7 G/DL (ref 12–16)
HGB UR QL STRIP: ABNORMAL
HYALINE CASTS UR QL AUTO: 0 /LPF
HYPOCHROMIA BLD QL SMEAR: ABNORMAL
HYPOCHROMIA BLD QL SMEAR: ABNORMAL
IMM GRANULOCYTES # BLD AUTO: 0.03 K/UL (ref 0–0.04)
IMM GRANULOCYTES # BLD AUTO: 0.13 K/UL (ref 0–0.04)
IMM GRANULOCYTES NFR BLD AUTO: 0.4 % (ref 0–0.5)
IMM GRANULOCYTES NFR BLD AUTO: 0.6 % (ref 0–0.5)
IRON SERPL-MCNC: 131 UG/DL (ref 30–160)
KETONES UR QL STRIP: NEGATIVE
LEUKOCYTE ESTERASE UR QL STRIP: ABNORMAL
LYMPHOCYTES # BLD AUTO: 1.6 K/UL (ref 1–4.8)
LYMPHOCYTES # BLD AUTO: 2 K/UL (ref 1–4.8)
LYMPHOCYTES NFR BLD: 23.6 % (ref 18–48)
LYMPHOCYTES NFR BLD: 7.8 % (ref 18–48)
MAGNESIUM SERPL-MCNC: 1.8 MG/DL (ref 1.6–2.6)
MCH RBC QN AUTO: 16.2 PG (ref 27–31)
MCH RBC QN AUTO: 22.5 PG (ref 27–31)
MCHC RBC AUTO-ENTMCNC: 25.9 G/DL (ref 32–36)
MCHC RBC AUTO-ENTMCNC: 29.7 G/DL (ref 32–36)
MCV RBC AUTO: 62 FL (ref 82–98)
MCV RBC AUTO: 76 FL (ref 82–98)
MICROSCOPIC COMMENT: ABNORMAL
MONOCYTES # BLD AUTO: 0.7 K/UL (ref 0.3–1)
MONOCYTES # BLD AUTO: 0.7 K/UL (ref 0.3–1)
MONOCYTES NFR BLD: 3.6 % (ref 4–15)
MONOCYTES NFR BLD: 7.9 % (ref 4–15)
NEUTROPHILS # BLD AUTO: 17.6 K/UL (ref 1.8–7.7)
NEUTROPHILS # BLD AUTO: 5.5 K/UL (ref 1.8–7.7)
NEUTROPHILS NFR BLD: 65 % (ref 38–73)
NEUTROPHILS NFR BLD: 86.9 % (ref 38–73)
NITRITE UR QL STRIP: NEGATIVE
NRBC BLD-RTO: 0 /100 WBC
NRBC BLD-RTO: 1 /100 WBC
PH UR STRIP: 7 [PH] (ref 5–8)
PLATELET # BLD AUTO: 293 K/UL (ref 150–450)
PLATELET # BLD AUTO: 344 K/UL (ref 150–450)
PLATELET BLD QL SMEAR: ABNORMAL
PMV BLD AUTO: 9.1 FL (ref 9.2–12.9)
PMV BLD AUTO: 9.2 FL (ref 9.2–12.9)
POIKILOCYTOSIS BLD QL SMEAR: ABNORMAL
POIKILOCYTOSIS BLD QL SMEAR: SLIGHT
POTASSIUM SERPL-SCNC: 3.3 MMOL/L (ref 3.5–5.1)
PROT SERPL-MCNC: 6 G/DL (ref 6–8.4)
PROT UR QL STRIP: ABNORMAL
RBC # BLD AUTO: 2.66 M/UL (ref 4–5.4)
RBC # BLD AUTO: 3.42 M/UL (ref 4–5.4)
RBC #/AREA URNS AUTO: 22 /HPF (ref 0–4)
SATURATED IRON: 34 % (ref 20–50)
SODIUM SERPL-SCNC: 136 MMOL/L (ref 136–145)
SP GR UR STRIP: 1.01 (ref 1–1.03)
SPECIMEN OUTDATE: NORMAL
SPHEROCYTES BLD QL SMEAR: ABNORMAL
SQUAMOUS #/AREA URNS AUTO: 5 /HPF
TARGETS BLD QL SMEAR: ABNORMAL
TOTAL IRON BINDING CAPACITY: 383 UG/DL (ref 250–450)
TRANS ERYTHROCYTES VOL PATIENT: NORMAL ML
TRANS ERYTHROCYTES VOL PATIENT: NORMAL ML
TRANSFERRIN SERPL-MCNC: 259 MG/DL (ref 200–375)
TROPONIN I SERPL DL<=0.01 NG/ML-MCNC: <0.006 NG/ML (ref 0–0.03)
URN SPEC COLLECT METH UR: ABNORMAL
VIT B12 SERPL-MCNC: 828 PG/ML (ref 210–950)
WBC # BLD AUTO: 20.25 K/UL (ref 3.9–12.7)
WBC # BLD AUTO: 8.51 K/UL (ref 3.9–12.7)
WBC #/AREA URNS AUTO: 59 /HPF (ref 0–5)

## 2023-11-27 PROCEDURE — 87086 URINE CULTURE/COLONY COUNT: CPT

## 2023-11-27 PROCEDURE — 36430 TRANSFUSION BLD/BLD COMPNT: CPT

## 2023-11-27 PROCEDURE — 93005 ELECTROCARDIOGRAM TRACING: CPT

## 2023-11-27 PROCEDURE — 82607 VITAMIN B-12: CPT

## 2023-11-27 PROCEDURE — 83735 ASSAY OF MAGNESIUM: CPT | Performed by: EMERGENCY MEDICINE

## 2023-11-27 PROCEDURE — 25000003 PHARM REV CODE 250: Performed by: EMERGENCY MEDICINE

## 2023-11-27 PROCEDURE — 63600175 PHARM REV CODE 636 W HCPCS: Performed by: EMERGENCY MEDICINE

## 2023-11-27 PROCEDURE — 85025 COMPLETE CBC W/AUTO DIFF WBC: CPT | Performed by: EMERGENCY MEDICINE

## 2023-11-27 PROCEDURE — 87186 SC STD MICRODIL/AGAR DIL: CPT

## 2023-11-27 PROCEDURE — 81001 URINALYSIS AUTO W/SCOPE: CPT

## 2023-11-27 PROCEDURE — 93010 ELECTROCARDIOGRAM REPORT: CPT | Mod: ,,, | Performed by: INTERNAL MEDICINE

## 2023-11-27 PROCEDURE — 85025 COMPLETE CBC W/AUTO DIFF WBC: CPT | Mod: 91

## 2023-11-27 PROCEDURE — 83540 ASSAY OF IRON: CPT

## 2023-11-27 PROCEDURE — 96365 THER/PROPH/DIAG IV INF INIT: CPT

## 2023-11-27 PROCEDURE — 87088 URINE BACTERIA CULTURE: CPT

## 2023-11-27 PROCEDURE — 96361 HYDRATE IV INFUSION ADD-ON: CPT

## 2023-11-27 PROCEDURE — 93010 EKG 12-LEAD: ICD-10-PCS | Mod: ,,, | Performed by: INTERNAL MEDICINE

## 2023-11-27 PROCEDURE — P9021 RED BLOOD CELLS UNIT: HCPCS | Performed by: EMERGENCY MEDICINE

## 2023-11-27 PROCEDURE — 83880 ASSAY OF NATRIURETIC PEPTIDE: CPT | Performed by: EMERGENCY MEDICINE

## 2023-11-27 PROCEDURE — 80053 COMPREHEN METABOLIC PANEL: CPT | Performed by: EMERGENCY MEDICINE

## 2023-11-27 PROCEDURE — 82728 ASSAY OF FERRITIN: CPT

## 2023-11-27 PROCEDURE — C9113 INJ PANTOPRAZOLE SODIUM, VIA: HCPCS | Performed by: EMERGENCY MEDICINE

## 2023-11-27 PROCEDURE — 99291 CRITICAL CARE FIRST HOUR: CPT

## 2023-11-27 PROCEDURE — 87077 CULTURE AEROBIC IDENTIFY: CPT

## 2023-11-27 PROCEDURE — 86803 HEPATITIS C AB TEST: CPT | Performed by: PHYSICIAN ASSISTANT

## 2023-11-27 PROCEDURE — 99223 1ST HOSP IP/OBS HIGH 75: CPT | Mod: ,,, | Performed by: INTERNAL MEDICINE

## 2023-11-27 PROCEDURE — 82746 ASSAY OF FOLIC ACID SERUM: CPT

## 2023-11-27 PROCEDURE — 86850 RBC ANTIBODY SCREEN: CPT | Performed by: EMERGENCY MEDICINE

## 2023-11-27 PROCEDURE — 96374 THER/PROPH/DIAG INJ IV PUSH: CPT | Mod: 59

## 2023-11-27 PROCEDURE — 86920 COMPATIBILITY TEST SPIN: CPT | Performed by: EMERGENCY MEDICINE

## 2023-11-27 PROCEDURE — 84466 ASSAY OF TRANSFERRIN: CPT

## 2023-11-27 PROCEDURE — 20600001 HC STEP DOWN PRIVATE ROOM

## 2023-11-27 PROCEDURE — 99223 PR INITIAL HOSPITAL CARE,LEVL III: ICD-10-PCS | Mod: ,,, | Performed by: INTERNAL MEDICINE

## 2023-11-27 PROCEDURE — 36415 COLL VENOUS BLD VENIPUNCTURE: CPT

## 2023-11-27 PROCEDURE — 84484 ASSAY OF TROPONIN QUANT: CPT | Performed by: EMERGENCY MEDICINE

## 2023-11-27 RX ORDER — HYDROCODONE BITARTRATE AND ACETAMINOPHEN 500; 5 MG/1; MG/1
TABLET ORAL
Status: DISCONTINUED | OUTPATIENT
Start: 2023-11-27 | End: 2023-12-05

## 2023-11-27 RX ORDER — ACETAMINOPHEN 325 MG/1
650 TABLET ORAL EVERY 4 HOURS PRN
Status: DISCONTINUED | OUTPATIENT
Start: 2023-11-27 | End: 2023-12-08 | Stop reason: HOSPADM

## 2023-11-27 RX ORDER — PANTOPRAZOLE SODIUM 40 MG/10ML
80 INJECTION, POWDER, LYOPHILIZED, FOR SOLUTION INTRAVENOUS
Status: COMPLETED | OUTPATIENT
Start: 2023-11-27 | End: 2023-11-27

## 2023-11-27 RX ORDER — IBUPROFEN 200 MG
16 TABLET ORAL
Status: DISCONTINUED | OUTPATIENT
Start: 2023-11-27 | End: 2023-12-08 | Stop reason: HOSPADM

## 2023-11-27 RX ORDER — IBUPROFEN 200 MG
1 TABLET ORAL DAILY PRN
Status: DISCONTINUED | OUTPATIENT
Start: 2023-11-27 | End: 2023-12-08 | Stop reason: HOSPADM

## 2023-11-27 RX ORDER — ONDANSETRON 8 MG/1
8 TABLET, ORALLY DISINTEGRATING ORAL EVERY 8 HOURS PRN
Status: DISCONTINUED | OUTPATIENT
Start: 2023-11-27 | End: 2023-12-08 | Stop reason: HOSPADM

## 2023-11-27 RX ORDER — TALC
6 POWDER (GRAM) TOPICAL NIGHTLY PRN
Status: DISCONTINUED | OUTPATIENT
Start: 2023-11-27 | End: 2023-12-08 | Stop reason: HOSPADM

## 2023-11-27 RX ORDER — GLUCAGON 1 MG
1 KIT INJECTION
Status: DISCONTINUED | OUTPATIENT
Start: 2023-11-27 | End: 2023-12-08 | Stop reason: HOSPADM

## 2023-11-27 RX ORDER — PROMETHAZINE HYDROCHLORIDE 12.5 MG/1
25 TABLET ORAL EVERY 6 HOURS PRN
Status: DISCONTINUED | OUTPATIENT
Start: 2023-11-27 | End: 2023-12-08 | Stop reason: HOSPADM

## 2023-11-27 RX ORDER — BISACODYL 10 MG
10 SUPPOSITORY, RECTAL RECTAL DAILY PRN
Status: DISCONTINUED | OUTPATIENT
Start: 2023-11-27 | End: 2023-12-08 | Stop reason: HOSPADM

## 2023-11-27 RX ORDER — MAG HYDROX/ALUMINUM HYD/SIMETH 200-200-20
30 SUSPENSION, ORAL (FINAL DOSE FORM) ORAL
Status: DISCONTINUED | OUTPATIENT
Start: 2023-11-27 | End: 2023-11-27

## 2023-11-27 RX ORDER — IBUPROFEN 200 MG
24 TABLET ORAL
Status: DISCONTINUED | OUTPATIENT
Start: 2023-11-27 | End: 2023-12-08 | Stop reason: HOSPADM

## 2023-11-27 RX ORDER — SODIUM CHLORIDE 0.9 % (FLUSH) 0.9 %
10 SYRINGE (ML) INJECTION
Status: DISCONTINUED | OUTPATIENT
Start: 2023-11-27 | End: 2023-12-08 | Stop reason: HOSPADM

## 2023-11-27 RX ORDER — POLYETHYLENE GLYCOL 3350 17 G/17G
17 POWDER, FOR SOLUTION ORAL DAILY PRN
Status: DISCONTINUED | OUTPATIENT
Start: 2023-11-27 | End: 2023-12-08 | Stop reason: HOSPADM

## 2023-11-27 RX ADMIN — PANTOPRAZOLE SODIUM 80 MG: 40 INJECTION, POWDER, FOR SOLUTION INTRAVENOUS at 11:11

## 2023-11-27 RX ADMIN — PANTOPRAZOLE SODIUM 8 MG/HR: 40 INJECTION, POWDER, FOR SOLUTION INTRAVENOUS at 01:11

## 2023-11-27 RX ADMIN — SODIUM CHLORIDE 1000 ML: 9 INJECTION, SOLUTION INTRAVENOUS at 11:11

## 2023-11-27 RX ADMIN — POTASSIUM BICARBONATE 40 MEQ: 391 TABLET, EFFERVESCENT ORAL at 02:11

## 2023-11-27 RX ADMIN — PANTOPRAZOLE SODIUM 8 MG/HR: 40 INJECTION, POWDER, FOR SOLUTION INTRAVENOUS at 12:11

## 2023-11-27 NOTE — HPI
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 Pt noted that NH reported that her blood levels were low.  No reported change to medications. Pt continued on eliquis Eliquis. No noted black or tarry stools, gnawing epigastric pain, nausea, vomiting, chest pain arrest, swelling of the extremities, fever, cough. In ED pt noted to have anemia with Hgb at 4.3. Two units of pRBCs were transfused. Positive FOBT but negative for melena. Pt uncertain of current/past gastrointestinal bleeding episodes, however, she presented to ED in 11/18/23 for BRB found in diaper by Pt's daughter. GI bleed attributed to diverticulosis vs proctitis at that time. Pt was continued on eliquis on discharge for history of DVT. Colonoscopy on 11/22/23 significant for 5 mm benign tubular adenoma. GI consulted at this visit for blood loss anemia.

## 2023-11-27 NOTE — ASSESSMENT & PLAN NOTE
- hx noted  - holding Eliquis in setting of acute blood loss  - will restart when clinically indicated  - will place sequential compression devices

## 2023-11-27 NOTE — HPI
"Ana Vasquez is a 83 y.o. female with a hx of DVT and HTN presents to the ED from her NH for shortness of breath with exertion. Patient is a poor historian. Needing frequent redirection during the interview. Patient unable to tell me why she is here. When asked about her needing blood, she states "she always has low blood level." Per ED provider, " She reports someone in her nursing home told her that her blood levels were low but she always has "low blood levels". She denies change in recent medications and thinks she continues to take Eliquis.  She denies noting recent black or tarry stools, gnawing epigastric pain, nausea, vomiting, chest pain arrest, swelling of the extremities, fever, cough.  She denies knowledge of past gastrointestinal bleeding episodes.  She thinks she takes a multivitamin but is unsure if it may contain iron." Patient denies any rectal bleeding, hematuria and hemoptysis on my exam. Denies any further complaints. Endorses improvement of shortness of breath.     ED: AF, tachycardic and hypotensive on admission. Hgb 4.3. K 3.3. . CXR showed no acute process. 2 units pRBCs ordered in ED and transfused. GI consulted. Given IV protonix in ED.   "

## 2023-11-27 NOTE — CONSULTS
[facility] - Emergency Dept  Gastroenterology  Consult Note    Patient Name: Ana Vasquez  MRN: [id number]  Admission Date: 11/27/2023  Hospital Length of Stay: 0 days  Code Status: Full Code   Attending Provider: Josefa Carreon MD   Consulting Provider: Jose Manuel Berry MD  Primary Care Physician: Jennifer Schwartz MD  Principal Problem:Symptomatic anemia    Inpatient consult to Gastroenterology  Consult performed by: Jose Manuel Berry MD  Consult ordered by: Elijah Suero MD  Reason for consult: Acute symptomatic anemia in setting of positive FOBT concerning for GIB        Subjective:     HPI:  Ms Vasquez is an 82yo lady 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 Pt noted that NH reported that her blood levels were low.  No reported change to medications. Pt continued on eliquis Eliquis. No noted black or tarry stools, gnawing epigastric pain, nausea, vomiting, chest pain arrest, swelling of the extremities, fever, cough. In ED pt noted to have anemia with Hgb at 4.3. Two units of pRBCs were transfused. Positive FOBT but negative for melena. Pt uncertain of current/past gastrointestinal bleeding episodes, however, she presented to ED in 11/18/23 for BRB found in diaper by Pt's daughter. GI bleed attributed to diverticulosis vs proctitis at that time. Pt was continued on eliquis on discharge for history of DVT. Colonoscopy on 11/22/23 significant for 5 mm benign tubular adenoma. GI consulted at this visit for blood loss anemia.     Past Medical History:   Diagnosis Date    Anticoagulant long-term use        Past Surgical History:   Procedure Laterality Date    COLONOSCOPY N/A 11/22/2021    Procedure: COLONOSCOPY;  Surgeon: Charbel Crooks MD;  Location: [facility] ([address] [place] [address]);  Service: Endoscopy;  Laterality: N/A;    HYSTERECTOMY         Review of patient's allergies indicates:  No Known  Allergies  Family History       Problem Relation (Age of Onset)    Cancer Maternal Grandfather    Diabetes Mother, Father          Tobacco Use    Smoking status: Every Day     Current packs/day: 0.50     Average packs/day: 0.5 packs/day for 66.0 years (33.0 total pack years)     Types: Cigarettes    Smokeless tobacco: Never    Tobacco comments:     1 pack every 3 days   Substance and Sexual Activity    Alcohol use: Not Currently     Comment: occasionally    Drug use: Never    Sexual activity: Not Currently     Review of Systems   Constitutional:  Negative for activity change, appetite change, fatigue and fever.   HENT:  Negative for congestion.    Respiratory:  Positive for shortness of breath. Negative for cough, chest tightness and wheezing.    Cardiovascular:  Positive for leg swelling. Negative for chest pain.   Gastrointestinal:  Positive for abdominal pain. Negative for abdominal distention, anal bleeding, blood in stool, constipation, diarrhea, nausea, rectal pain and vomiting.   Musculoskeletal:  Negative for back pain.   Neurological:  Negative for headaches.   Hematological:  Negative for adenopathy.   Psychiatric/Behavioral:  Negative for agitation.      Objective:     Vital Signs (Most Recent):  Temp: 97.7 °F (36.5 °C) (11/27/23 1530)  Pulse: 87 (11/27/23 1530)  Resp: 20 (11/27/23 1530)  BP: (Abnormal) 104/51 (11/27/23 1530)  SpO2: 100 % (11/27/23 1530) Vital Signs (24h Range):  Temp:  [97.3 °F (36.3 °C)-97.8 °F (36.6 °C)] 97.7 °F (36.5 °C)  Pulse:  [] 87  Resp:  [16-27] 20  SpO2:  [97 %-100 %] 100 %  BP: ()/(36-81) 104/51     Weight: 79.4 kg (175 lb) (11/27/23 1041)  Body mass index is 29.12 kg/m².      Intake/Output Summary (Last 24 hours) at 11/27/2023 1542  Last data filed at 11/27/2023 1528  Gross per 24 hour   Intake 2082.5 ml   Output no documentation   Net 2082.5 ml       Lines/Drains/Airways       Peripheral Intravenous Line       Name Duration         Peripheral IV - Single Lumen  11/27/23 1131 18 G Anterior;Proximal;Right Forearm <1 day         Peripheral IV - Single Lumen 11/27/23 1141 18 G Anterior;Left Forearm <1 day                     Physical Exam  Vitals and nursing note reviewed.   Constitutional:       General: She is not in acute distress.     Appearance: Normal appearance. She is not ill-appearing.   HENT:      Head: Normocephalic and atraumatic.      Nose: No congestion.      Mouth/Throat:      Pharynx: No oropharyngeal exudate.   Cardiovascular:      Rate and Rhythm: Normal rate and regular rhythm.      Pulses: Normal pulses.      Heart sounds: No murmur heard.  Abdominal:      General: Bowel sounds are normal. There is no distension.      Palpations: Abdomen is soft. There is no shifting dullness, fluid wave or mass.      Tenderness: There is abdominal tenderness in the right upper quadrant and epigastric area. There is no right CVA tenderness, left CVA tenderness, guarding or rebound. Negative signs include Ng's sign and McBurney's sign.   Musculoskeletal:         General: No tenderness.   Skin:     General: Skin is warm.      Capillary Refill: Capillary refill takes less than 2 seconds.   Neurological:      General: No focal deficit present.      Mental Status: She is alert and oriented to person, place, and time.          Significant Labs:  All pertinent lab results from the last 24 hours have been reviewed.    Significant Imaging:  Imaging results within the past 24 hours have been reviewed.  Assessment/Plan:     GI  Lower GI bleed  Ms. Ana Vasquez presented with acute anemia in setting of positive FOBT and is being treated for lower GI Bleed likely associated with polyp vs diverticula vs proctitis. PMH significan for <5mm polyp on colonoscopy in 2021. CAMERON showed no melena/hematochezia with positive FOBT. Anticoagulants/antiplatelet treatment held in setting of bleed. NSAID therapy discontinued/held. IVF resuscitation and IV PPI bolused/started. Type and screen  ordered and CBC currently being trended. GI consulted, appreciate recs. Pt to remain NPO with ongoing bleed. May advance to clear liquids if stabilized.  --Repeat CBC  --Maintain IV access with at least 2 large IVs (18 gauge or larger)  --Continued IVF resuscitation as tolerated with attention to active co-morbidities.   --RBC transfusion as indicated if Hgb <7 g/dL or if Hgb <8 g/dL with ongoing bleed.   --PPI 80 mg IV bolus once, then 8 mg/hour infusion or IV PPI 40 BID  --Correct any coagulopathy with platelets and FFP to a goal of platelets >50K and INR <2.0.   --Timing of future Endoscopy to be determined GI Team   --Notify for significant change in patient's clinical status with concern for hemodynamic compromise in the setting of overt Lower GIB.   --Patient may warrant urgent EGD or CTA with possible IR intervention.     WBC (K/uL)   Date Value   11/27/2023 8.51   11/29/2021 7.07   11/28/2021 6.53     Hemoglobin (g/dL)   Date Value   11/27/2023 4.3 (LL)   11/29/2021 11.5 (L)   11/28/2021 11.3 (L)     Platelets (K/uL)   Date Value   11/27/2023 344   11/29/2021 172   11/28/2021 193              Thank you for your consult. I will follow-up with patient. Please contact us if you have any additional questions.    Jose Manuel Berry MD  Gastroenterology  [facility] - Emergency Dept

## 2023-11-27 NOTE — ASSESSMENT & PLAN NOTE
Ms. Ana Vasquez presented with acute anemia in setting of positive FOBT and is being treated for lower GI Bleed likely associated with polyp vs diverticula vs proctitis. PMH significan for <5mm polyp on colonoscopy in 2021. CAMERON showed no melena/hematochezia with positive FOBT. Anticoagulants/antiplatelet treatment held in setting of bleed. NSAID therapy discontinued/held. IVF resuscitation and IV PPI bolused/started. Type and screen ordered and CBC currently being trended. GI consulted, appreciate recs. Pt to remain NPO with ongoing bleed. May advance to clear liquids if stabilized.  --Repeat CBC  --Maintain IV access with at least 2 large IVs (18 gauge or larger)  --Continued IVF resuscitation as tolerated with attention to active co-morbidities.   --RBC transfusion as indicated if Hgb <7 g/dL or if Hgb <8 g/dL with ongoing bleed.   --PPI 80 mg IV bolus once, then 8 mg/hour infusion or IV PPI 40 BID  --Correct any coagulopathy with platelets and FFP to a goal of platelets >50K and INR <2.0.   --Timing of future Endoscopy to be determined GI Team   --Notify for significant change in patient's clinical status with concern for hemodynamic compromise in the setting of overt Lower GIB.   --Patient may warrant urgent EGD or CTA with possible IR intervention.     WBC (K/uL)   Date Value   11/27/2023 8.51   11/29/2021 7.07   11/28/2021 6.53     Hemoglobin (g/dL)   Date Value   11/27/2023 4.3 (LL)   11/29/2021 11.5 (L)   11/28/2021 11.3 (L)     Platelets (K/uL)   Date Value   11/27/2023 344   11/29/2021 172   11/28/2021 193

## 2023-11-27 NOTE — ED PROVIDER NOTES
"Encounter Date: 11/27/2023       History     Chief Complaint   Patient presents with    Abnormal Lab     Arrives via EMS with c/o abnormal labs - low H&H at 4.0 and 13.8 and hypokalemic at 2.7 +SOB      This patient is an 83-year-old female Past Medical History:  No date: Anticoagulant long-term use on Eliquis for past diagnosis of DVT, hysterectomy presenting with complaints of shortness of breath with minimal exertion over the last few days.  She additionally reports someone in her nursing home told her that her blood levels were low but she always has "low blood levels".  She denies change in recent medications and thinks she continues to take Eliquis.  She denies noting recent black or tarry stools, gnawing epigastric pain, nausea, vomiting, chest pain arrest, swelling of the extremities, fever, cough.  She denies knowledge of past gastrointestinal bleeding episodes.  She thinks she takes a multivitamin but is unsure if it may contain iron.        Review of patient's allergies indicates:  No Known Allergies  Past Medical History:   Diagnosis Date    Anticoagulant long-term use      Past Surgical History:   Procedure Laterality Date    COLONOSCOPY N/A 11/22/2021    Procedure: COLONOSCOPY;  Surgeon: Charbel Crooks MD;  Location: [address]);  Service: Endoscopy;  Laterality: N/A;    HYSTERECTOMY       Family History   Problem Relation Age of Onset    Diabetes Mother     Diabetes Father     Cancer Maternal Grandfather      Social History     Tobacco Use    Smoking status: Every Day     Current packs/day: 0.50     Average packs/day: 0.5 packs/day for 66.0 years (33.0 ttl pk-yrs)     Types: Cigarettes    Smokeless tobacco: Never    Tobacco comments:     1 pack every 3 days   Substance Use Topics    Alcohol use: Not Currently     Comment: occasionally    Drug use: Never     Review of Systems   Constitutional:  Positive for fatigue.   HENT:  Negative for congestion.    Respiratory:  Positive for shortness " of breath.    Cardiovascular:  Negative for chest pain.   Gastrointestinal:  Negative for abdominal pain.   Genitourinary:  Negative for dysuria.   Musculoskeletal:  Negative for joint swelling.   Skin:  Positive for pallor.   Neurological:  Positive for weakness.   Hematological:  Bruises/bleeds easily.   Psychiatric/Behavioral:  Negative for confusion.        Physical Exam     Initial Vitals   BP Pulse Resp Temp SpO2   11/27/23 1041 11/27/23 1041 11/27/23 1041 11/27/23 1309 11/27/23 1041   (!) 92/58 (!) 115 16 97.3 °F (36.3 °C) 99 %      MAP       --                Physical Exam    Nursing note and vitals reviewed.  Constitutional: Vital signs are normal. She appears well-nourished.   HENT:   Head: Normocephalic and atraumatic.   Eyes: Conjunctivae and EOM are normal.   Neck: Neck supple. No thyroid mass present.   Normal range of motion.  Cardiovascular:  Normal rate, regular rhythm and normal heart sounds.     Exam reveals no gallop and no friction rub.       No murmur heard.  Pulmonary/Chest: Breath sounds normal. She has no wheezes. She has no rhonchi. She has no rales.   Abdominal: Abdomen is soft. Bowel sounds are normal. There is no abdominal tenderness.   Genitourinary: Rectum:      Guaiac result positive.   Guaiac positive stool. : Acceptable.   Genitourinary Comments: Positive FOBT without javi melena     Musculoskeletal:         General: No tenderness.      Cervical back: Normal range of motion and neck supple.      Comments: Mild erythema over sacrum consistent with stage I     Neurological: She is alert and oriented to person, place, and time. She has normal strength. No cranial nerve deficit or sensory deficit. GCS score is 15. GCS eye subscore is 4. GCS verbal subscore is 5. GCS motor subscore is 6.   Skin: Skin is warm and dry. No rash noted. No erythema. There is pallor.   Psychiatric: She has a normal mood and affect. Her speech is normal. Thought content normal. Cognition and  memory are normal.         ED Course   Procedures  Labs Reviewed   CBC W/ AUTO DIFFERENTIAL - Abnormal; Notable for the following components:       Result Value    RBC 2.66 (*)     Hemoglobin 4.3 (*)     Hematocrit 16.6 (*)     MCV 62 (*)     MCH 16.2 (*)     MCHC 25.9 (*)     RDW 21.2 (*)     MPV 9.1 (*)     nRBC 1 (*)     All other components within normal limits    Narrative:     H&H   critical result(s) called and verbal readback obtained from   Rohini Diaz RN  by THOMAS 11/27/2023 13:39   COMPREHENSIVE METABOLIC PANEL - Abnormal; Notable for the following components:    Potassium 3.3 (*)     CO2 22 (*)     BUN 29 (*)     Calcium 8.0 (*)     Albumin 1.9 (*)     eGFR 55.9 (*)     All other components within normal limits   B-TYPE NATRIURETIC PEPTIDE - Abnormal; Notable for the following components:     (*)     All other components within normal limits   HEPATITIS C ANTIBODY    Narrative:     Release to patient->Immediate   TROPONIN I   MAGNESIUM   CBC W/ AUTO DIFFERENTIAL   FERRITIN   FOLATE   VITAMIN B12   IRON AND TIBC   TYPE & SCREEN   PREPARE RBC SOFT   PREPARE RBC SOFT   PREPARE WHOLE BLOOD SOFT        ECG Results              EKG 12-lead (Final result)  Result time 11/27/23 16:15:48      Final result by Interface, Lab In [facility] (11/27/23 16:15:48)                   Narrative:    Test Reason : R06.02,    Vent. Rate : 112 BPM     Atrial Rate : 000 BPM     P-R Int : 000 ms          QRS Dur : 082 ms      QT Int : 306 ms       P-R-T Axes : 000 042 162 degrees     QTc Int : 417 ms    Accelerated Junctional rhythm  Low voltage QRS  Abnormal ECG  When compared with ECG of 05-AUG-2021 13:55,  Significant changes have occurred  Confirmed by Lasha Alexander MD ([id number]) on 11/27/2023 4:15:36 PM    Referred By: [email or web address]   SELF           Confirmed By:Lasha Alexander MD                                  Imaging Results              X-Ray Chest AP Portable (Final result)  Result time 11/27/23 12:30:55      Final result  by Juwan Pickett MD (11/27/23 12:30:55)                   Impression:      See above      Electronically signed by: Juwan Pickett MD  Date:    11/27/2023  Time:    12:30               Narrative:    EXAMINATION:  XR CHEST AP PORTABLE    CLINICAL HISTORY:  Shortness of breath    TECHNIQUE:  Single frontal view of the chest was performed.    COMPARISON:  N 11/22/2021 one    FINDINGS:  Heart size normal.  The lungs are clear.  No pleural effusion                                       Medications   pantoprazole (PROTONIX) 40 mg in sodium chloride 0.9 % 100 mL IVPB (MB+) (0 mg/hr Intravenous Stopped 11/27/23 1509)   0.9%  NaCl infusion (for blood administration) (has no administration in time range)   0.9%  NaCl infusion (for blood administration) (has no administration in time range)   sodium chloride 0.9% flush 10 mL (has no administration in time range)   melatonin tablet 6 mg (has no administration in time range)   ondansetron disintegrating tablet 8 mg (has no administration in time range)   promethazine tablet 25 mg (has no administration in time range)   polyethylene glycol packet 17 g (has no administration in time range)   bisacodyL suppository 10 mg (has no administration in time range)   acetaminophen tablet 650 mg (has no administration in time range)   glucose chewable tablet 16 g (has no administration in time range)   glucose chewable tablet 24 g (has no administration in time range)   glucagon (human recombinant) injection 1 mg (has no administration in time range)   nicotine 14 mg/24 hr 1 patch (has no administration in time range)   dextrose 10% bolus 125 mL 125 mL (has no administration in time range)   dextrose 10% bolus 250 mL 250 mL (has no administration in time range)   sodium chloride 0.9% bolus 1,000 mL 1,000 mL (0 mLs Intravenous Stopped 11/27/23 1319)   pantoprazole injection 80 mg (80 mg Intravenous Given 11/27/23 1147)   potassium bicarbonate disintegrating tablet 40 mEq (40 mEq Oral Given  11/27/23 7395)     Medical Decision Making  Patient rapidly assessed shortly after arrival.  Vital signs significant for tachycardia and hypotension.  Accompanying reports indicate concern for hemoglobin of 4.4.  Type and screen obtained, IVF initiated.  Patient has a positive Hemoccult on rectal exam and will be started on Protonix bolus and infusion.  She will warrant admission for further transfusion, monitoring, probable GI consultation.    Labs late resulting in patient's blood pressure began to drop, requested emergency release of typo positive units which were rapidly transfused.  Patient has a history of Rh positivity.  There subsequent improvement in blood pressure.  Further observing in the emergency department did not reveal evidence of melena or other abnormal diarrhea.  Patient mental status remained at baseline per my initial assessment.  She does warrant admission further monitoring.  Routine GI consultation placed in the case discussed with on-call hospital medicine who did evaluate the patient.  Patient in agreement with plan of care.    Amount and/or Complexity of Data Reviewed  Labs: ordered.  Radiology: ordered.    Risk  Prescription drug management.  Decision regarding hospitalization.              Attending Attestation:         Attending Critical Care:   Critical Care Times:   Direct Patient Care (initial evaluation, reassessments, and time considering the case)................................................................35 minutes.   Additional History from reviewing old medical records or taking additional history from the family, EMS, PCP, etc.......................5 minutes.   Ordering, Reviewing, and Interpreting Diagnostic Studies...............................................................................................................5 minutes.    Documentation..................................................................................................................................................................................5 minutes.   Consultation with other Physicians. .................................................................................................................................................10 minutes.   Consultation with the patient's family directly relating to the patient's condition, care, and DNR status (when patient unable)......0 minutes.   Other..................................................................................................................................................................................................5 minutes.   ==============================================================  Total Critical Care Time - exclusive of procedural time: 65 minutes.  ==============================================================  Critical care was necessary to treat or prevent imminent or life-threatening deterioration of the following conditions: GI bleeding.   The following critical care procedures were done by me (see procedure notes): pulse oximetry.   Critical care was time spent personally by me on the following activities: obtaining history from patient or relative, examination of patient, review of old charts, ordering lab, x-rays, and/or EKG, development of treatment plan with patient or relative, ordering and performing treatments and interventions, discussions with primary provider, evaluation of patient's response to treatment, re-evaluation of patient's conition, end of life discussions and interpretation of cardiac measurements.   Critical Care Condition: life-threatening                                  Clinical Impression:  Final diagnoses:  [R06.02] Shortness of breath  [R06.02] SOB (shortness of breath)  [D64.9] Symptomatic anemia          ED Disposition Condition    Admit                  Elijah Suero MD  11/27/23 9814

## 2023-11-27 NOTE — SUBJECTIVE & OBJECTIVE
Past Medical History:   Diagnosis Date    Anticoagulant long-term use        Past Surgical History:   Procedure Laterality Date    COLONOSCOPY N/A 11/22/2021    Procedure: COLONOSCOPY;  Surgeon: Charbel Crooks MD;  Location: [facility] ([address] [place] [address]);  Service: Endoscopy;  Laterality: N/A;    HYSTERECTOMY         Review of patient's allergies indicates:  No Known Allergies  Family History       Problem Relation (Age of Onset)    Cancer Maternal Grandfather    Diabetes Mother, Father          Tobacco Use    Smoking status: Every Day     Current packs/day: 0.50     Average packs/day: 0.5 packs/day for 66.0 years (33.0 total pack years)     Types: Cigarettes    Smokeless tobacco: Never    Tobacco comments:     1 pack every 3 days   Substance and Sexual Activity    Alcohol use: Not Currently     Comment: occasionally    Drug use: Never    Sexual activity: Not Currently     Review of Systems   Constitutional:  Negative for activity change, appetite change, fatigue and fever.   HENT:  Negative for congestion.    Respiratory:  Positive for shortness of breath. Negative for cough, chest tightness and wheezing.    Cardiovascular:  Positive for leg swelling. Negative for chest pain.   Gastrointestinal:  Positive for abdominal pain. Negative for abdominal distention, anal bleeding, blood in stool, constipation, diarrhea, nausea, rectal pain and vomiting.   Musculoskeletal:  Negative for back pain.   Neurological:  Negative for headaches.   Hematological:  Negative for adenopathy.   Psychiatric/Behavioral:  Negative for agitation.      Objective:     Vital Signs (Most Recent):  Temp: 97.7 °F (36.5 °C) (11/27/23 1530)  Pulse: 87 (11/27/23 1530)  Resp: 20 (11/27/23 1530)  BP: (Abnormal) 104/51 (11/27/23 1530)  SpO2: 100 % (11/27/23 1530) Vital Signs (24h Range):  Temp:  [97.3 °F (36.3 °C)-97.8 °F (36.6 °C)] 97.7 °F (36.5 °C)  Pulse:  [] 87  Resp:  [16-27] 20  SpO2:  [97 %-100 %] 100 %  BP: ()/(36-81) 104/51     Weight:  79.4 kg (175 lb) (11/27/23 1041)  Body mass index is 29.12 kg/m².      Intake/Output Summary (Last 24 hours) at 11/27/2023 1542  Last data filed at 11/27/2023 1528  Gross per 24 hour   Intake 2082.5 ml   Output no documentation   Net 2082.5 ml       Lines/Drains/Airways       Peripheral Intravenous Line       Name Duration         Peripheral IV - Single Lumen 11/27/23 1131 18 G Anterior;Proximal;Right Forearm <1 day         Peripheral IV - Single Lumen 11/27/23 1141 18 G Anterior;Left Forearm <1 day                     Physical Exam  Vitals and nursing note reviewed.   Constitutional:       General: She is not in acute distress.     Appearance: Normal appearance. She is not ill-appearing.   HENT:      Head: Normocephalic and atraumatic.      Nose: No congestion.      Mouth/Throat:      Pharynx: No oropharyngeal exudate.   Cardiovascular:      Rate and Rhythm: Normal rate and regular rhythm.      Pulses: Normal pulses.      Heart sounds: No murmur heard.  Abdominal:      General: Bowel sounds are normal. There is no distension.      Palpations: Abdomen is soft. There is no shifting dullness, fluid wave or mass.      Tenderness: There is abdominal tenderness in the right upper quadrant and epigastric area. There is no right CVA tenderness, left CVA tenderness, guarding or rebound. Negative signs include Ng's sign and McBurney's sign.   Musculoskeletal:         General: No tenderness.   Skin:     General: Skin is warm.      Capillary Refill: Capillary refill takes less than 2 seconds.   Neurological:      General: No focal deficit present.      Mental Status: She is alert and oriented to person, place, and time.          Significant Labs:  All pertinent lab results from the last 24 hours have been reviewed.    Significant Imaging:  Imaging results within the past 24 hours have been reviewed.

## 2023-11-27 NOTE — ASSESSMENT & PLAN NOTE
- hx noted, hypotensive on admission  - monitor volume status/ hemodynamics closely with acute blood loss

## 2023-11-27 NOTE — ASSESSMENT & PLAN NOTE
- FOBT positive   - Hgb 4 on admit, baseline around ~11 2 years ago  - continue IV protonix infusion  - GI consulted, appreciate assistance  - Type and screen, blood consent obtained  - CBCq 8h.  Transfuse for Hemoglobin <7  - Coagulopathy goals:  Plt >50, INR <1.5  - remain NPO at this time- awaiting GI recs for scope

## 2023-11-27 NOTE — PROVIDER PROGRESS NOTES - EMERGENCY DEPT.
Encounter Date: 11/27/2023    ED Physician Progress Notes         EKG - STEMI Decision  Initial Reading: No STEMI present.  Response: No Action Needed.

## 2023-11-27 NOTE — ASSESSMENT & PLAN NOTE
Patient has hypokalemia which is Acute and currently controlled. Most recent potassium levels reviewed-   Lab Results   Component Value Date    K 3.5 11/28/2023   . Will continue potassium replacement per protocol and recheck repeat levels after replacement completed.

## 2023-11-27 NOTE — ASSESSMENT & PLAN NOTE
Acute blood loss anemia  Patient's anemia is currently controlled. Has received 2 units of PRBCs on 11/27 . Etiology likely d/t acute blood loss which was from unknown cause.  Current CBC reviewed-   Lab Results   Component Value Date    HGB 4.3 (LL) 11/27/2023    HCT 16.6 (LL) 11/27/2023     Monitor serial CBC and transfuse if patient becomes hemodynamically unstable, symptomatic or H/H drops below 7/21.  - last known Hgb was ~11 in 2021, pt has Eliquis on med list but she cannot say if she is taking it  - guaiac positive  - Hgb 4.6>> receiving 2 units in ED  - anemia panel pending  - CBC q8  - transfuse Hgb <7  - GI consulted, appreciate assistance   - low threshold for ICU care if becomes hemodynamically unstable

## 2023-11-27 NOTE — H&P
"  [facility] - Emergency Dept  [facility] Medicine  History & Physical    Patient Name: Ana Vasquez  MRN: [id number]  Patient Class: IP- Inpatient  Admission Date: 11/27/2023  Attending Physician: Josefa Carreon MD   Primary Care Provider: Jennifer Schwartz MD         Patient information was obtained from patient and ER records.     Subjective:     Principal Problem:Symptomatic anemia    Chief Complaint:   Chief Complaint   Patient presents with    Abnormal Lab     Arrives via EMS with c/o abnormal labs - low H&H at 4.0 and 13.8 and hypokalemic at 2.7 +SOB         HPI: Ana Vasquez is a 83 y.o. female with a hx of DVT and HTN presents to the ED from her NH for shortness of breath with exertion. Patient is a poor historian. Needing frequent redirection during the interview. Patient unable to tell me why she is here. When asked about her needing blood, she states "she always has low blood level." Per ED provider, " She reports someone in her nursing home told her that her blood levels were low but she always has "low blood levels". She denies change in recent medications and thinks she continues to take Eliquis.  She denies noting recent black or tarry stools, gnawing epigastric pain, nausea, vomiting, chest pain arrest, swelling of the extremities, fever, cough.  She denies knowledge of past gastrointestinal bleeding episodes.  She thinks she takes a multivitamin but is unsure if it may contain iron." Patient denies any rectal bleeding, hematuria and hemoptysis on my exam. Denies any further complaints. Endorses improvement of shortness of breath.     ED: AF, tachycardic and hypotensive on admission. Hgb 4.3. K 3.3. . CXR showed no acute process. 2 units pRBCs ordered in ED and transfused. GI consulted. Given IV protonix in ED.     Past Medical History:   Diagnosis Date    Anticoagulant long-term use        Past Surgical History:   Procedure Laterality Date    COLONOSCOPY N/A 11/22/2021    Procedure: " COLONOSCOPY;  Surgeon: Charbel Crooks MD;  Location: [facility] ([address] [place] [address]);  Service: Endoscopy;  Laterality: N/A;    HYSTERECTOMY         Review of patient's allergies indicates:  No Known Allergies    No current facility-administered medications on file prior to encounter.     Current Outpatient Medications on File Prior to Encounter   Medication Sig    apixaban (ELIQUIS) 5 mg Tab Take 1 tablet (5 mg total) by mouth 2 (two) times daily.    miconazole NITRATE 2 % (MICOTIN) 2 % top powder Apply topically 2 (two) times daily.    multivitamin Tab Take 1 tablet by mouth once daily.    pantoprazole (PROTONIX) 40 MG tablet Take 1 tablet (40 mg total) by mouth 2 (two) times daily.    vitamin D (VITAMIN D3) 1000 units Tab Take 1 tablet (1,000 Units total) by mouth once daily.     Family History       Problem Relation (Age of Onset)    Cancer Maternal Grandfather    Diabetes Mother, Father          Tobacco Use    Smoking status: Every Day     Current packs/day: 0.50     Average packs/day: 0.5 packs/day for 66.0 years (33.0 ttl pk-yrs)     Types: Cigarettes    Smokeless tobacco: Never    Tobacco comments:     1 pack every 3 days   Substance and Sexual Activity    Alcohol use: Not Currently     Comment: occasionally    Drug use: Never    Sexual activity: Not Currently     Review of Systems   Constitutional:  Negative for chills and fever.   Respiratory:  Positive for shortness of breath (improving). Negative for chest tightness.    Cardiovascular:  Negative for chest pain and leg swelling.   Gastrointestinal:  Negative for abdominal pain and nausea.   Neurological:  Negative for dizziness and weakness.     Objective:     Vital Signs (Most Recent):  Temp: 97.7 °F (36.5 °C) (11/27/23 1530)  Pulse: 87 (11/27/23 1530)  Resp: 20 (11/27/23 1530)  BP: (!) 104/51 (11/27/23 1530)  SpO2: 100 % (11/27/23 1530) Vital Signs (24h Range):  Temp:  [97.3 °F (36.3 °C)-97.8 °F (36.6 °C)] 97.7 °F (36.5 °C)  Pulse:  [] 87  Resp:  [16-27]  20  SpO2:  [97 %-100 %] 100 %  BP: ()/(36-81) 104/51     Weight: 79.4 kg (175 lb)  Body mass index is 29.12 kg/m².     Physical Exam  Vitals and nursing note reviewed.   Constitutional:       Appearance: She is well-developed.      Comments: Pleasant on exam, in no acute distress  Has to be redirected multiple times   HENT:      Head: Normocephalic and atraumatic.   Eyes:      Pupils: Pupils are equal, round, and reactive to light.   Cardiovascular:      Rate and Rhythm: Regular rhythm. Tachycardia present.   Pulmonary:      Effort: Pulmonary effort is normal.      Breath sounds: Normal breath sounds.   Abdominal:      Palpations: Abdomen is soft.      Tenderness: There is no abdominal tenderness.   Genitourinary:     Comments: Per ED: Positive FOBT without javi melena  Musculoskeletal:         General: No tenderness.   Skin:     General: Skin is warm and dry.   Neurological:      Mental Status: She is alert.      Comments: Oriented to person and place   Psychiatric:         Behavior: Behavior normal.              CRANIAL NERVES     CN III, IV, VI   Pupils are equal, round, and reactive to light.       Significant Labs: All pertinent labs within the past 24 hours have been reviewed.  BMP:   Recent Labs   Lab 11/27/23  1132         K 3.3*      CO2 22*   BUN 29*   CREATININE 1.0   CALCIUM 8.0*   MG 1.8     CBC:   Recent Labs   Lab 11/27/23  1132   WBC 8.51   HGB 4.3*   HCT 16.6*          Significant Imaging: I have reviewed all pertinent imaging results/findings within the past 24 hours.    Imaging Results              X-Ray Chest AP Portable (Final result)  Result time 11/27/23 12:30:55      Final result by Juwan Pickett MD (11/27/23 12:30:55)                   Impression:      See above      Electronically signed by: Juwan Pickett MD  Date:    11/27/2023  Time:    12:30               Narrative:    EXAMINATION:  XR CHEST AP PORTABLE    CLINICAL HISTORY:  Shortness of  breath    TECHNIQUE:  Single frontal view of the chest was performed.    COMPARISON:  N 11/22/2021 one    FINDINGS:  Heart size normal.  The lungs are clear.  No pleural effusion                                      Assessment/Plan:     * Symptomatic anemia  Acute blood loss anemia  Patient's anemia is currently controlled. Has received 2 units of PRBCs on 11/27 . Etiology likely d/t acute blood loss which was from unknown cause.  Current CBC reviewed-   Lab Results   Component Value Date    HGB 4.3 (LL) 11/27/2023    HCT 16.6 (LL) 11/27/2023     Monitor serial CBC and transfuse if patient becomes hemodynamically unstable, symptomatic or H/H drops below 7/21.  - last known Hgb was ~11 in 2021, pt has Eliquis on med list but she cannot say if she is taking it  - FOBT positive without melena  - Hgb 4.6>> receiving 2 units in ED  - anemia panel pending  - CBC q8  - transfuse Hgb <7  - GI consulted, appreciate assistance   - low threshold for ICU care if becomes hemodynamically unstable    Lower GI bleed  - FOBT positive   - Hgb 4 on admit, baseline around ~11 2 years ago  - continue IV protonix infusion  - GI consulted, appreciate assistance  - Type and screen, blood consent obtained  - CBCq 8h.  Transfuse for Hemoglobin <7  - Coagulopathy goals:  Plt >50, INR <1.5  - remain NPO at this time    Hypokalemia  Patient has hypokalemia which is Acute and currently controlled. Most recent potassium levels reviewed-   Lab Results   Component Value Date    K 3.3 (L) 11/27/2023   . Will continue potassium replacement per protocol and recheck repeat levels after replacement completed.     Chronic deep vein thrombosis (DVT) of proximal vein of right lower extremity  - hx noted  - holding Eliquis in setting of acute blood loss  - will restart when clinically indicated  - will place sequential compression devices       Essential hypertension  - hx noted, hypotensive on admission  - monitor volume status/ hemodynamics closely with  acute blood loss      VTE Risk Mitigation (From admission, onward)           Ordered     Place sequential compression device  Until discontinued         11/27/23 1608     IP VTE HIGH RISK PATIENT  Once         11/27/23 1412     Reason for No Pharmacological VTE Prophylaxis  Once        Question:  Reasons:  Answer:  Active Bleeding    11/27/23 1412                           Alisha Day PA-C  Department of Hospital Medicine  [facility] - Emergency Dept

## 2023-11-27 NOTE — SUBJECTIVE & OBJECTIVE
Past Medical History:   Diagnosis Date    Anticoagulant long-term use        Past Surgical History:   Procedure Laterality Date    COLONOSCOPY N/A 11/22/2021    Procedure: COLONOSCOPY;  Surgeon: Charbel Crooks MD;  Location: [facility] ([address] [place] [address]);  Service: Endoscopy;  Laterality: N/A;    HYSTERECTOMY         Review of patient's allergies indicates:  No Known Allergies    No current facility-administered medications on file prior to encounter.     Current Outpatient Medications on File Prior to Encounter   Medication Sig    apixaban (ELIQUIS) 5 mg Tab Take 1 tablet (5 mg total) by mouth 2 (two) times daily.    miconazole NITRATE 2 % (MICOTIN) 2 % top powder Apply topically 2 (two) times daily.    multivitamin Tab Take 1 tablet by mouth once daily.    pantoprazole (PROTONIX) 40 MG tablet Take 1 tablet (40 mg total) by mouth 2 (two) times daily.    vitamin D (VITAMIN D3) 1000 units Tab Take 1 tablet (1,000 Units total) by mouth once daily.     Family History       Problem Relation (Age of Onset)    Cancer Maternal Grandfather    Diabetes Mother, Father          Tobacco Use    Smoking status: Every Day     Current packs/day: 0.50     Average packs/day: 0.5 packs/day for 66.0 years (33.0 ttl pk-yrs)     Types: Cigarettes    Smokeless tobacco: Never    Tobacco comments:     1 pack every 3 days   Substance and Sexual Activity    Alcohol use: Not Currently     Comment: occasionally    Drug use: Never    Sexual activity: Not Currently     Review of Systems   Constitutional:  Negative for chills and fever.   Respiratory:  Positive for shortness of breath (improving). Negative for chest tightness.    Cardiovascular:  Negative for chest pain and leg swelling.   Gastrointestinal:  Negative for abdominal pain and nausea.   Neurological:  Negative for dizziness and weakness.     Objective:     Vital Signs (Most Recent):  Temp: 97.7 °F (36.5 °C) (11/27/23 1530)  Pulse: 87 (11/27/23 1530)  Resp: 20 (11/27/23 1530)  BP: (!)  104/51 (11/27/23 1530)  SpO2: 100 % (11/27/23 1530) Vital Signs (24h Range):  Temp:  [97.3 °F (36.3 °C)-97.8 °F (36.6 °C)] 97.7 °F (36.5 °C)  Pulse:  [] 87  Resp:  [16-27] 20  SpO2:  [97 %-100 %] 100 %  BP: ()/(36-81) 104/51     Weight: 79.4 kg (175 lb)  Body mass index is 29.12 kg/m².     Physical Exam  Vitals and nursing note reviewed.   Constitutional:       Appearance: She is well-developed.      Comments: Pleasant on exam, in no acute distress  Has to be redirected multiple times   HENT:      Head: Normocephalic and atraumatic.   Eyes:      Pupils: Pupils are equal, round, and reactive to light.   Cardiovascular:      Rate and Rhythm: Regular rhythm. Tachycardia present.   Pulmonary:      Effort: Pulmonary effort is normal.      Breath sounds: Normal breath sounds.   Abdominal:      Palpations: Abdomen is soft.      Tenderness: There is no abdominal tenderness.   Genitourinary:     Comments: Per ED: Positive FOBT without javi melena  Musculoskeletal:         General: No tenderness.   Skin:     General: Skin is warm and dry.   Neurological:      Mental Status: She is alert.      Comments: Oriented to person and place   Psychiatric:         Behavior: Behavior normal.              CRANIAL NERVES     CN III, IV, VI   Pupils are equal, round, and reactive to light.       Significant Labs: All pertinent labs within the past 24 hours have been reviewed.  BMP:   Recent Labs   Lab 11/27/23  1132         K 3.3*      CO2 22*   BUN 29*   CREATININE 1.0   CALCIUM 8.0*   MG 1.8     CBC:   Recent Labs   Lab 11/27/23  1132   WBC 8.51   HGB 4.3*   HCT 16.6*          Significant Imaging: I have reviewed all pertinent imaging results/findings within the past 24 hours.    Imaging Results              X-Ray Chest AP Portable (Final result)  Result time 11/27/23 12:30:55      Final result by Juwan Pickett MD (11/27/23 12:30:55)                   Impression:      See above      Electronically  signed by: Juwan Pickett MD  Date:    11/27/2023  Time:    12:30               Narrative:    EXAMINATION:  XR CHEST AP PORTABLE    CLINICAL HISTORY:  Shortness of breath    TECHNIQUE:  Single frontal view of the chest was performed.    COMPARISON:  N 11/22/2021 one    FINDINGS:  Heart size normal.  The lungs are clear.  No pleural effusion

## 2023-11-28 PROBLEM — N30.00 ACUTE CYSTITIS: Status: ACTIVE | Noted: 2023-11-28

## 2023-11-28 LAB
ALBUMIN SERPL BCP-MCNC: 1.9 G/DL (ref 3.5–5.2)
ALP SERPL-CCNC: 93 U/L (ref 55–135)
ALT SERPL W/O P-5'-P-CCNC: 11 U/L (ref 10–44)
ANION GAP SERPL CALC-SCNC: 7 MMOL/L (ref 8–16)
ANISOCYTOSIS BLD QL SMEAR: ABNORMAL
AST SERPL-CCNC: 21 U/L (ref 10–40)
BASO STIPL BLD QL SMEAR: ABNORMAL
BASOPHILS # BLD AUTO: 0.03 K/UL (ref 0–0.2)
BASOPHILS NFR BLD: 0 % (ref 0–1.9)
BASOPHILS NFR BLD: 0.3 % (ref 0–1.9)
BILIRUB SERPL-MCNC: 2.1 MG/DL (ref 0.1–1)
BUN SERPL-MCNC: 30 MG/DL (ref 8–23)
BURR CELLS BLD QL SMEAR: ABNORMAL
CALCIUM SERPL-MCNC: 7.7 MG/DL (ref 8.7–10.5)
CHLORIDE SERPL-SCNC: 108 MMOL/L (ref 95–110)
CO2 SERPL-SCNC: 23 MMOL/L (ref 23–29)
CREAT SERPL-MCNC: 0.9 MG/DL (ref 0.5–1.4)
DIFFERENTIAL METHOD: ABNORMAL
DIFFERENTIAL METHOD: ABNORMAL
DOHLE BOD BLD QL SMEAR: PRESENT
EOSINOPHIL # BLD AUTO: 0.3 K/UL (ref 0–0.5)
EOSINOPHIL NFR BLD: 0 % (ref 0–8)
EOSINOPHIL NFR BLD: 2.3 % (ref 0–8)
ERYTHROCYTE [DISTWIDTH] IN BLOOD BY AUTOMATED COUNT: 27.3 % (ref 11.5–14.5)
ERYTHROCYTE [DISTWIDTH] IN BLOOD BY AUTOMATED COUNT: ABNORMAL % (ref 11.5–14.5)
EST. GFR  (NO RACE VARIABLE): >60 ML/MIN/1.73 M^2
GLUCOSE SERPL-MCNC: 65 MG/DL (ref 70–110)
HCT VFR BLD AUTO: 27.6 % (ref 37–48.5)
HCT VFR BLD AUTO: 29.2 % (ref 37–48.5)
HGB BLD-MCNC: 8.8 G/DL (ref 12–16)
HGB BLD-MCNC: 9.1 G/DL (ref 12–16)
HYPOCHROMIA BLD QL SMEAR: ABNORMAL
IMM GRANULOCYTES # BLD AUTO: 0.06 K/UL (ref 0–0.04)
IMM GRANULOCYTES # BLD AUTO: ABNORMAL K/UL (ref 0–0.04)
IMM GRANULOCYTES NFR BLD AUTO: 0.6 % (ref 0–0.5)
IMM GRANULOCYTES NFR BLD AUTO: ABNORMAL % (ref 0–0.5)
INR PPP: 1.3 (ref 0.8–1.2)
LYMPHOCYTES # BLD AUTO: 2 K/UL (ref 1–4.8)
LYMPHOCYTES NFR BLD: 18.2 % (ref 18–48)
LYMPHOCYTES NFR BLD: 7 % (ref 18–48)
MAGNESIUM SERPL-MCNC: 1.8 MG/DL (ref 1.6–2.6)
MCH RBC QN AUTO: 23.5 PG (ref 27–31)
MCH RBC QN AUTO: 23.5 PG (ref 27–31)
MCHC RBC AUTO-ENTMCNC: 31.2 G/DL (ref 32–36)
MCHC RBC AUTO-ENTMCNC: 31.9 G/DL (ref 32–36)
MCV RBC AUTO: 74 FL (ref 82–98)
MCV RBC AUTO: 76 FL (ref 82–98)
MONOCYTES # BLD AUTO: 0.8 K/UL (ref 0.3–1)
MONOCYTES NFR BLD: 4 % (ref 4–15)
MONOCYTES NFR BLD: 7.4 % (ref 4–15)
NEUTROPHILS # BLD AUTO: 7.7 K/UL (ref 1.8–7.7)
NEUTROPHILS NFR BLD: 71.2 % (ref 38–73)
NEUTROPHILS NFR BLD: 84 % (ref 38–73)
NEUTS BAND NFR BLD MANUAL: 5 %
NRBC BLD-RTO: 0 /100 WBC
NRBC BLD-RTO: 1 /100 WBC
OVALOCYTES BLD QL SMEAR: ABNORMAL
PHOSPHATE SERPL-MCNC: 2.2 MG/DL (ref 2.7–4.5)
PLATELET # BLD AUTO: 248 K/UL (ref 150–450)
PLATELET # BLD AUTO: 302 K/UL (ref 150–450)
PLATELET BLD QL SMEAR: ABNORMAL
PMV BLD AUTO: 9 FL (ref 9.2–12.9)
PMV BLD AUTO: 9.1 FL (ref 9.2–12.9)
POIKILOCYTOSIS BLD QL SMEAR: ABNORMAL
POLYCHROMASIA BLD QL SMEAR: ABNORMAL
POTASSIUM SERPL-SCNC: 3.5 MMOL/L (ref 3.5–5.1)
PROT SERPL-MCNC: 5.4 G/DL (ref 6–8.4)
PROTHROMBIN TIME: 13.4 SEC (ref 9–12.5)
RBC # BLD AUTO: 3.74 M/UL (ref 4–5.4)
RBC # BLD AUTO: 3.87 M/UL (ref 4–5.4)
SODIUM SERPL-SCNC: 138 MMOL/L (ref 136–145)
SPHEROCYTES BLD QL SMEAR: ABNORMAL
TARGETS BLD QL SMEAR: ABNORMAL
TOXIC GRANULES BLD QL SMEAR: PRESENT
WBC # BLD AUTO: 10.74 K/UL (ref 3.9–12.7)
WBC # BLD AUTO: 17.6 K/UL (ref 3.9–12.7)

## 2023-11-28 PROCEDURE — 20600001 HC STEP DOWN PRIVATE ROOM

## 2023-11-28 PROCEDURE — 85025 COMPLETE CBC W/AUTO DIFF WBC: CPT

## 2023-11-28 PROCEDURE — 80053 COMPREHEN METABOLIC PANEL: CPT

## 2023-11-28 PROCEDURE — 83735 ASSAY OF MAGNESIUM: CPT

## 2023-11-28 PROCEDURE — 99223 PR INITIAL HOSPITAL CARE,LEVL III: ICD-10-PCS | Mod: ,,, | Performed by: INTERNAL MEDICINE

## 2023-11-28 PROCEDURE — 25000003 PHARM REV CODE 250: Performed by: STUDENT IN AN ORGANIZED HEALTH CARE EDUCATION/TRAINING PROGRAM

## 2023-11-28 PROCEDURE — 99223 1ST HOSP IP/OBS HIGH 75: CPT | Mod: ,,, | Performed by: INTERNAL MEDICINE

## 2023-11-28 PROCEDURE — 85610 PROTHROMBIN TIME: CPT | Performed by: STUDENT IN AN ORGANIZED HEALTH CARE EDUCATION/TRAINING PROGRAM

## 2023-11-28 PROCEDURE — 36415 COLL VENOUS BLD VENIPUNCTURE: CPT

## 2023-11-28 PROCEDURE — 84100 ASSAY OF PHOSPHORUS: CPT

## 2023-11-28 PROCEDURE — C9113 INJ PANTOPRAZOLE SODIUM, VIA: HCPCS | Performed by: EMERGENCY MEDICINE

## 2023-11-28 PROCEDURE — 25000003 PHARM REV CODE 250: Performed by: EMERGENCY MEDICINE

## 2023-11-28 PROCEDURE — 63600175 PHARM REV CODE 636 W HCPCS: Performed by: STUDENT IN AN ORGANIZED HEALTH CARE EDUCATION/TRAINING PROGRAM

## 2023-11-28 PROCEDURE — 63600175 PHARM REV CODE 636 W HCPCS: Performed by: EMERGENCY MEDICINE

## 2023-11-28 PROCEDURE — 36415 COLL VENOUS BLD VENIPUNCTURE: CPT | Performed by: STUDENT IN AN ORGANIZED HEALTH CARE EDUCATION/TRAINING PROGRAM

## 2023-11-28 RX ORDER — HYDROCHLOROTHIAZIDE 12.5 MG/1
12.5 TABLET ORAL DAILY
COMMUNITY

## 2023-11-28 RX ORDER — ASCORBIC ACID 500 MG
500 TABLET ORAL 2 TIMES DAILY
COMMUNITY

## 2023-11-28 RX ADMIN — PANTOPRAZOLE SODIUM 8 MG/HR: 40 INJECTION, POWDER, FOR SOLUTION INTRAVENOUS at 02:11

## 2023-11-28 RX ADMIN — PANTOPRAZOLE SODIUM 8 MG/HR: 40 INJECTION, POWDER, FOR SOLUTION INTRAVENOUS at 08:11

## 2023-11-28 RX ADMIN — PANTOPRAZOLE SODIUM 8 MG/HR: 40 INJECTION, POWDER, FOR SOLUTION INTRAVENOUS at 09:11

## 2023-11-28 RX ADMIN — PANTOPRAZOLE SODIUM 8 MG/HR: 40 INJECTION, POWDER, FOR SOLUTION INTRAVENOUS at 04:11

## 2023-11-28 RX ADMIN — CEFTRIAXONE 1 G: 1 INJECTION, POWDER, FOR SOLUTION INTRAMUSCULAR; INTRAVENOUS at 09:11

## 2023-11-28 NOTE — PLAN OF CARE
[facility] ([place] [id number])  Initial Discharge Assessment       Primary Care Provider: Jennifer Schwartz MD    Admission Diagnosis: Shortness of breath [R06.02]  SOB (shortness of breath) [R06.02]  Chest pain [R07.9]  Symptomatic anemia [D64.9]    Admission Date: 11/27/2023  Expected Discharge Date: 12/3/2023    Transition of Care Barriers: None    Payor: MEDICARE / Plan: MEDICARE PART A & B / Product Type: Government /     Extended Emergency Contact Information  Primary Emergency Contact: Brianna Oneil  Mobile Phone: [phone]  Relation: Daughter  Preferred language: English   needed? No  Secondary Emergency Contact: BlasAlyssia  Mobile Phone: [phone]  Relation: Grandchild   needed? No    Discharge Plan A: Return to nursing home ([facility] Phone: [phone])  Discharge Plan B: Return to Nursing Home ([facility] Phone: [phone])      [id number] - [place] - [address]  [address]  [place] [address]  Phone: [phone] Fax: [phone]      Initial Assessment (most recent)       Adult Discharge Assessment - 11/28/23 0845          Discharge Assessment    Assessment Type Discharge Planning Assessment     Confirmed/corrected address, phone number and insurance Yes     Confirmed Demographics Contacted registration to update     Source of Information facility verbal report;health record     When was your last doctors appointment? 11/20/23     Reason For Admission Symptomatic anemia     People in Home facility resident     Facility Arrived From: [facility] Phone: [phone]     Prior to hospitilization cognitive status: Not Oriented to Place;Not Oriented to Time     Current cognitive status: Not Oriented to Place;Not Oriented to Time     Equipment Currently Used at Home wheelchair     Readmission within 30 days? No     Patient currently being followed by outpatient  case management? No     Do you currently have service(s) that help you manage your care at home? No     Do you take prescription medications? Yes     Do you have prescription coverage? Yes     Coverage : MEDICARE - MEDICARE PART A & B -     Do you have any problems affording any of your prescribed medications? No     Is the patient taking medications as prescribed? yes     [email or web address] Phone: [phone]     How do you get to doctors appointments? health plan transportation     Are you on dialysis? No     Do you take coumadin? No     DME Needed Upon Discharge  other (see comments)   TBD    Discharge Plan discussed with: Patient     Transition of Care Barriers None     Discharge Plan A [facility] nursing home   [email or web address] Phone: [phone]    Discharge Plan B [email or web address] Phone: [phone]       Physical Activity    On average, how many days per week do you engage in moderate to strenuous exercise (like a brisk walk)? 0 days     On average, how many minutes do you engage in exercise at this level? 0 min        Financial Resource Strain    How hard is it for you to pay for the very basics like food, housing, medical care, and heating? Not hard at all        Housing Stability    In the last 12 months, was there a time when you were not able to pay the mortgage or rent on time? No     In the last 12 months, was there a time when you did not have a steady place to sleep or slept in a shelter (including now)? No        Transportation Needs    In the past 12 months, has lack of transportation kept you from medical appointments or from getting medications? No     In the past 12 months, has lack of transportation kept you from meetings, work, or from getting things needed for daily living? No        Food Insecurity    Within the past 12 months, you worried that your food would run  out before you got the money to buy more. Never true     Within the past 12 months, the food you bought just didn't last and you didn't have money to get more. Never true        Stress    Do you feel stress - tense, restless, nervous, or anxious, or unable to sleep at night because your mind is troubled all the time - these days? Only a little        Social Connections    In a typical week, how many times do you talk on the phone with family, friends, or neighbors? Never     How often do you get together with friends or relatives? Never     How often do you attend Anabaptist or Adventist services? Never     Do you belong to any clubs or organizations such as Anabaptist groups, unions, fraternal or athletic groups, or school groups? No     How often do you attend meetings of the clubs or organizations you belong to? Never     Are you , , , , never , or living with a partner?         Alcohol Use    Q1: How often do you have a drink containing alcohol? Never     Q2: How many drinks containing alcohol do you have on a typical day when you are drinking? Patient does not drink     Q3: How often do you have six or more drinks on one occasion? Never        OTHER    [facility] Phone: [phone]                          CM met with patient at bedside to complete discharge planning assessment.  Patient alert and oriented xs 1-2  Patient verified all demographic information on facesheet is correct.  Patient PCP is NH physician.  Patients primary health insurance is Medicare A/B and  [employer]. . Patient  is agreeable with bedside medication delivery.   Patient is not  active with  home health and has listed DME.  Patient with NO POA or Living Will.  Patient not on dialysis or medication coumadin.  Patient with no 30 day admission.  Patient with no financial issues at this time.  Patient will need  transportation upon discharge  from facility.  Patient will have assistance from staff at [facility] Phone: [phone]   upon discharge Patient  dependent with ADLs,and resides at [facility] .  All questions answered regarding Case Management Discharge Planning, patient verbalized understanding.  Discharge booklet with CM's contact information given to patient.    Kira Russell RN  Case Management  [facility]  [phone]

## 2023-11-28 NOTE — HOSPITAL COURSE
"Patient admitted for anemia 2/2 possible GIB as patient on eliquis at home. AC held. Transfused 3 units,  trending H/H. GI consulted. No melena or bleeding noted. UA infectious and started on CTX, Ucx GNR awaiting speciation. Unable to contact family, contact numbers on chart are out of service, contacted NH who have the same numbers on file. Attempting to contact family for consent for scope as patient with underlying cognitive decline and unable to consent herself per GI. If unable to contact family, plan for scope outpt. H/H stable and no further bleeding. Risk benefit for restarting eliquis, for prior DVT in 2021. LE u/s ordered to eval DVT.     Patient is on Eliquis at home, and currently the patient is high-risk for bleeding as she presented with a hemoglobin of only 4. Therefore we require ethics consult as were not able to obtain a consent for an endoscopy vs. Stopping her Eliquis. There is worsening of her lower extremity edema.  Plan to start her on furosemide and obtain an echocardiogram.     Had a lengthy discussion this morning with the patient.  Patient reports she has had heavy periods when she was younger.  She feels that she has a tendency to easily bleed at times.  Discussed with patient that she is taking a blood thinner to stop her from forming a clot and it is risky to stop it as she may form another clot.  She is also risky to give it to her as she may bleed.  Patient points at her hands and reported she has had multiple sticks and she is tired of them.  Reports that she had significant pain during childbirth "however I ended up with the baby in my hand which was very rewarding, I am going through this much pain and I do not feel there is a reward at the end.  That has been said open to whatever you suggest at this point.  Would prefer a pill anticoagulant." Discussed with the patient that heparin which is the IV form can be reversed in the case of an emergency and would like to start with that " "if she is agreeable.  Patient thought about it for a few seconds and then looked at me and then said "I am okay with IV form of anticoagulant".  I asked the patient if she understands the risks of it.. "Yeah yeah..  Causes bleeding." She has awareness of the situation. Most of my conversation with the patient have been logical. However, I understand the GI fellow's concerns given her mental status therefore we are proceeding with caution regarding anticoagulation and consulting with ethics regarding discharge home with Eliquis vs. endoscopy. Ethics did not contact me or reach out. Therefore I consulted psych. Who eval pt and said that she has capacity. DVT lower extremity was discovered on ultrasound.  Starting on treatment dose of Eliquis. Discussed this with GI.  GI not intervening with a any procedure unless the patient can consent or the family can be contacted to consent. Appreciate Psychiatry assistance in evaluating patient. Patient has capacity per psych. GI consulted for evaluation for the need of endoscopy (again). She were not able to tolerate the prep. Back to NH.  "

## 2023-11-28 NOTE — SUBJECTIVE & OBJECTIVE
Interval History: Patient reports no concerns other than being thirsty.   H/H stable after transfusion.   Nurse denies seeing any blood in stool.     Ucx in process started on CTX.     Review of Systems   Constitutional:  Negative for fever.   Gastrointestinal:  Negative for abdominal pain, blood in stool, nausea and vomiting.     Objective:     Vital Signs (Most Recent):  Temp: 97.9 °F (36.6 °C) (11/28/23 0800)  Pulse: 79 (11/28/23 1115)  Resp: 19 (11/28/23 0800)  BP: (!) 118/56 (11/28/23 0800)  SpO2: 97 % (11/28/23 0800) Vital Signs (24h Range):  Temp:  [97.3 °F (36.3 °C)-98.6 °F (37 °C)] 97.9 °F (36.6 °C)  Pulse:  [] 79  Resp:  [16-31] 19  SpO2:  [96 %-100 %] 97 %  BP: ()/(36-81) 118/56     Weight: 79.4 kg (175 lb 0.7 oz)  Body mass index is 29.13 kg/m².    Intake/Output Summary (Last 24 hours) at 11/28/2023 1138  Last data filed at 11/28/2023 0518  Gross per 24 hour   Intake 2982.5 ml   Output --   Net 2982.5 ml         Physical Exam  Vitals and nursing note reviewed.   Constitutional:       General: She is not in acute distress.     Appearance: She is well-developed. She is obese.   HENT:      Head: Normocephalic and atraumatic.      Nose: Nose normal.      Mouth/Throat:      Mouth: Mucous membranes are dry.      Pharynx: Oropharynx is clear.   Eyes:      Extraocular Movements: Extraocular movements intact.      Pupils: Pupils are equal, round, and reactive to light.   Cardiovascular:      Rate and Rhythm: Normal rate and regular rhythm.   Pulmonary:      Effort: Pulmonary effort is normal.      Breath sounds: Normal breath sounds.   Abdominal:      General: Abdomen is flat. Bowel sounds are normal. There is no distension.      Palpations: Abdomen is soft.      Tenderness: There is no abdominal tenderness.   Genitourinary:     Comments: No melena or blood reported from nursing  Musculoskeletal:         General: No tenderness.   Skin:     General: Skin is warm and dry.   Neurological:      Mental  Status: She is alert.      Comments: Oriented to person and place   Psychiatric:         Behavior: Behavior normal.             Significant Labs: All pertinent labs within the past 24 hours have been reviewed.    Significant Imaging: I have reviewed all pertinent imaging results/findings within the past 24 hours.

## 2023-11-28 NOTE — ASSESSMENT & PLAN NOTE
Acute blood loss anemia  Patient's anemia is currently controlled. Has received 2 units of PRBCs on 11/27 . Etiology likely d/t acute blood loss which was from concern for GIB  Current CBC reviewed-   Lab Results   Component Value Date    HGB 8.8 (L) 11/28/2023    HCT 27.6 (L) 11/28/2023     Monitor serial CBC and transfuse if patient becomes hemodynamically unstable, symptomatic or H/H drops below 7/21.  - last known Hgb was ~11 in 2021, pt has Eliquis on med list but she cannot say if she is taking it  - guaiac positive  - Hgb 4.6>> receiving 2 units in ED  - anemia panel neg  - CBC q8  - transfuse Hgb <7  - GI consulted, appreciate assistance   - low threshold for ICU care if becomes hemodynamically unstable

## 2023-11-28 NOTE — ASSESSMENT & PLAN NOTE
Patient's anemia is currently controlled. Has received 2 units of PRBCs on 11/27 . Etiology likely d/t acute blood loss concern for GIB  Current CBC reviewed-   Lab Results   Component Value Date    HGB 8.8 (L) 11/28/2023    HCT 27.6 (L) 11/28/2023     Monitor serial CBC and transfuse if patient becomes hemodynamically unstable, symptomatic or H/H drops below 7/21.

## 2023-11-28 NOTE — PROGRESS NOTES
"[facility] ([address] Medicine  Progress Note    Patient Name: Ana Vasquez  MRN: [id number]  Patient Class: IP- Inpatient   Admission Date: 11/27/2023  Length of Stay: 1 days  Attending Physician: Josefa Carreon MD  Primary Care Provider: Jennifer Schwartz MD        Subjective:     Principal Problem:Symptomatic anemia        HPI:  Ana Vasquez is a 83 y.o. female with a hx of DVT and HTN presents to the ED from her NH for shortness of breath with exertion. Patient is a poor historian. Needing frequent redirection during the interview. Patient unable to tell me why she is here. When asked about her needing blood, she states "she always has low blood level." Per ED provider, " She reports someone in her nursing home told her that her blood levels were low but she always has "low blood levels". She denies change in recent medications and thinks she continues to take Eliquis.  She denies noting recent black or tarry stools, gnawing epigastric pain, nausea, vomiting, chest pain arrest, swelling of the extremities, fever, cough.  She denies knowledge of past gastrointestinal bleeding episodes.  She thinks she takes a multivitamin but is unsure if it may contain iron." Patient denies any rectal bleeding, hematuria and hemoptysis on my exam. Denies any further complaints. Endorses improvement of shortness of breath.     ED: AF, tachycardic and hypotensive on admission. Hgb 4.3. K 3.3. . CXR showed no acute process. 2 units pRBCs ordered in ED and transfused. GI consulted. Given IV protonix in ED.     Overview/Hospital Course:  Patient admitted for anemia 2/2 possible GIB as patient on eliquis at home. AC held. Transfused 3 units,  trending H/H. GI consulted. No melena or bleeding noted. UA infectious and started on CTX, Ucx in process.     Interval History: Patient reports no concerns other than being thirsty.   H/H stable after transfusion.   Nurse denies seeing any blood in " stool.     Ucx in process started on CTX.     Review of Systems   Constitutional:  Negative for fever.   Gastrointestinal:  Negative for abdominal pain, blood in stool, nausea and vomiting.     Objective:     Vital Signs (Most Recent):  Temp: 97.9 °F (36.6 °C) (11/28/23 0800)  Pulse: 79 (11/28/23 1115)  Resp: 19 (11/28/23 0800)  BP: (!) 118/56 (11/28/23 0800)  SpO2: 97 % (11/28/23 0800) Vital Signs (24h Range):  Temp:  [97.3 °F (36.3 °C)-98.6 °F (37 °C)] 97.9 °F (36.6 °C)  Pulse:  [] 79  Resp:  [16-31] 19  SpO2:  [96 %-100 %] 97 %  BP: ()/(36-81) 118/56     Weight: 79.4 kg (175 lb 0.7 oz)  Body mass index is 29.13 kg/m².    Intake/Output Summary (Last 24 hours) at 11/28/2023 1138  Last data filed at 11/28/2023 0518  Gross per 24 hour   Intake 2982.5 ml   Output --   Net 2982.5 ml         Physical Exam  Vitals and nursing note reviewed.   Constitutional:       General: She is not in acute distress.     Appearance: She is well-developed. She is obese.   HENT:      Head: Normocephalic and atraumatic.      Nose: Nose normal.      Mouth/Throat:      Mouth: Mucous membranes are dry.      Pharynx: Oropharynx is clear.   Eyes:      Extraocular Movements: Extraocular movements intact.      Pupils: Pupils are equal, round, and reactive to light.   Cardiovascular:      Rate and Rhythm: Normal rate and regular rhythm.   Pulmonary:      Effort: Pulmonary effort is normal.      Breath sounds: Normal breath sounds.   Abdominal:      General: Abdomen is flat. Bowel sounds are normal. There is no distension.      Palpations: Abdomen is soft.      Tenderness: There is no abdominal tenderness.   Genitourinary:     Comments: No melena or blood reported from nursing  Musculoskeletal:         General: No tenderness.   Skin:     General: Skin is warm and dry.   Neurological:      Mental Status: She is alert.      Comments: Oriented to person and place   Psychiatric:         Behavior: Behavior normal.             Significant  Labs: All pertinent labs within the past 24 hours have been reviewed.    Significant Imaging: I have reviewed all pertinent imaging results/findings within the past 24 hours.    Assessment/Plan:      * Symptomatic anemia  Acute blood loss anemia  Patient's anemia is currently controlled. Has received 3 units of PRBCs on 11/27 . Etiology likely d/t acute blood loss which was from concern for GIB  Current CBC reviewed-   Lab Results   Component Value Date    HGB 8.8 (L) 11/28/2023    HCT 27.6 (L) 11/28/2023     Monitor serial CBC and transfuse if patient becomes hemodynamically unstable, symptomatic or H/H drops below 7/21.  - last known Hgb was ~11 in 2021, pt has Eliquis on med list but she cannot say if she is taking it  - guaiac positive  - Hgb 4.6>> receiving 2 units in ED, additional unit on floor  - anemia panel neg  - CBC q8  - transfuse Hgb <7  - GI consulted, appreciate assistance   - low threshold for ICU care if becomes hemodynamically unstable    Acute cystitis  UA infectious, foul smelling  - Ucx in process  - CTX    Hypokalemia  Patient has hypokalemia which is Acute and currently controlled. Most recent potassium levels reviewed-   Lab Results   Component Value Date    K 3.5 11/28/2023   . Will continue potassium replacement per protocol and recheck repeat levels after replacement completed.     Lower GI bleed  - FOBT positive   - Hgb 4 on admit, baseline around ~11 2 years ago  - continue IV protonix infusion  - GI consulted, appreciate assistance  - Type and screen, blood consent obtained  - CBCq 8h.  Transfuse for Hemoglobin <7  - Coagulopathy goals:  Plt >50, INR <1.5  - remain NPO at this time- awaiting GI recs for scope     Acute blood loss anemia  Patient's anemia is currently controlled. Has received 3 units of PRBCs on 11/27 . Etiology likely d/t acute blood loss concern for GIB  Current CBC reviewed-   Lab Results   Component Value Date    HGB 8.8 (L) 11/28/2023    HCT 27.6 (L) 11/28/2023      Monitor serial CBC and transfuse if patient becomes hemodynamically unstable, symptomatic or H/H drops below 7/21.    Chronic deep vein thrombosis (DVT) of proximal vein of right lower extremity  - hx noted  - holding Eliquis in setting of acute blood loss  - will restart when clinically indicated  - will place sequential compression devices       Essential hypertension  - hx noted, hypotensive on admission  - monitor volume status/ hemodynamics closely with acute blood loss  - BP improved, monitor and adjust regimen as needed      VTE Risk Mitigation (From admission, onward)           Ordered     Place sequential compression device  Until discontinued         11/27/23 1608     IP VTE HIGH RISK PATIENT  Once         11/27/23 1412     Reason for No Pharmacological VTE Prophylaxis  Once        Question:  Reasons:  Answer:  Active Bleeding    11/27/23 1412                    Discharge Planning   BUBBA: 11/29/2023     Code Status: Full Code   Is the patient medically ready for discharge?: No    Reason for patient still in hospital (select all that apply): Patient trending condition and Consult recommendations                     Josefa Carreon MD  Department of Hospital Medicine   [facility] - Intensive Care ([place]

## 2023-11-28 NOTE — ASSESSMENT & PLAN NOTE
- hx noted, hypotensive on admission  - monitor volume status/ hemodynamics closely with acute blood loss  - BP improved, monitor and adjust regimen as needed

## 2023-11-28 NOTE — CONSULTS
Hospital Medicine Pharmacy Consult Note    PharmD Consult Received For:     Pharmacy to perform an admission medication history and reconciliation  Medication history completed. Please see the pharmacy med rec note documented on 11/28/23 for the updated medication list.      Pharmacy will sign-off, please re-consult as needed    Thank you for the consult,  Ginette Gracia, PharmD  Extension [phone]    **Note: Consults are reviewed Monday-Friday 7:00am-3:30pm. The above recommendations are only suggested. The recommendations should be considered in conjunction with all patient factors.**

## 2023-11-28 NOTE — ED NOTES
Telemetry Verification   Patient placed on Telemetry Box  Verified with War Room  Box #    Monitor Tech    Rate 80   Rhythm Normal sinsu

## 2023-11-28 NOTE — NURSING
Nurses Note -- 4 Eyes      11/28/2023   11:06 AM      Skin assessed during: Admit      [] No Altered Skin Integrity Present    []Prevention Measures Documented      [x] Yes- Altered Skin Integrity Present or Discovered   [x] LDA Added if Not in Epic (Describe Wound)   [x] New Altered Skin Integrity was Present on Admit and Documented in LDA   [] Wound Image Taken    Wound Care Consulted? Yes      Attending Nurse:  Malini SIDHU RN    Second RN/Staff Member:  Jeanie MCCLAIN RN    Patient has a skin tear on her vagina, and redness noted to her perineum and buttocks.

## 2023-11-28 NOTE — PHARMACY MED REC
"Admission Medication History     The home medication history was taken by Reece Grullon.    You may go to "Admission" then "Reconcile Home Medications" tabs to review and/or act upon these items.     The home medication list has been updated by the Pharmacy department.   Please read ALL comments highlighted in yellow.   Please address this information as you see fit.    Feel free to contact us if you have any questions or require assistance.      The medications listed below were removed from the home medication list. Please reorder if appropriate:  Patient reports no longer taking the following medication(s):  MICONAZOLE NITRATE 2 % TOPICAL POWDER  PANTOPRAZOLE 40 MG TABLET      Medications listed below were obtained from: Nursing home  PTA Medications   Medication Sig    apixaban (ELIQUIS) 5 mg Tab     Give 1 tablet (5 mg total) by mouth 2 (two) times daily.    ascorbic acid, vitamin C, (VITAMIN C) 500 MG tablet   Give 500 mg by mouth 2 (two) times daily.    hydroCHLOROthiazide (HYDRODIURIL) 12.5 MG Tab   Give 12.5 mg by mouth once daily.    multivitamin Tab   Give 1 tablet by mouth once daily.    vitamin D (VITAMIN D3) 1000 units Tab   Give 1 tablet (1,000 Units total) by mouth once daily.         Reece Grullon  EXT [id number]                  .          "

## 2023-11-29 LAB
ALBUMIN SERPL BCP-MCNC: 1.8 G/DL (ref 3.5–5.2)
ALP SERPL-CCNC: 88 U/L (ref 55–135)
ALT SERPL W/O P-5'-P-CCNC: 10 U/L (ref 10–44)
ANION GAP SERPL CALC-SCNC: 9 MMOL/L (ref 8–16)
AST SERPL-CCNC: 19 U/L (ref 10–40)
BASOPHILS # BLD AUTO: 0.05 K/UL (ref 0–0.2)
BASOPHILS NFR BLD: 0.6 % (ref 0–1.9)
BILIRUB SERPL-MCNC: 0.9 MG/DL (ref 0.1–1)
BLD PROD TYP BPU: NORMAL
BLD PROD TYP BPU: NORMAL
BLOOD UNIT EXPIRATION DATE: NORMAL
BLOOD UNIT EXPIRATION DATE: NORMAL
BLOOD UNIT TYPE CODE: 6200
BLOOD UNIT TYPE CODE: 6200
BLOOD UNIT TYPE: NORMAL
BLOOD UNIT TYPE: NORMAL
BUN SERPL-MCNC: 26 MG/DL (ref 8–23)
CALCIUM SERPL-MCNC: 7.5 MG/DL (ref 8.7–10.5)
CHLORIDE SERPL-SCNC: 106 MMOL/L (ref 95–110)
CO2 SERPL-SCNC: 20 MMOL/L (ref 23–29)
CODING SYSTEM: NORMAL
CODING SYSTEM: NORMAL
CREAT SERPL-MCNC: 0.9 MG/DL (ref 0.5–1.4)
CROSSMATCH INTERPRETATION: NORMAL
CROSSMATCH INTERPRETATION: NORMAL
DIFFERENTIAL METHOD: ABNORMAL
DISPENSE STATUS: NORMAL
DISPENSE STATUS: NORMAL
EOSINOPHIL # BLD AUTO: 0.3 K/UL (ref 0–0.5)
EOSINOPHIL NFR BLD: 3.7 % (ref 0–8)
ERYTHROCYTE [DISTWIDTH] IN BLOOD BY AUTOMATED COUNT: ABNORMAL % (ref 11.5–14.5)
EST. GFR  (NO RACE VARIABLE): >60 ML/MIN/1.73 M^2
GLUCOSE SERPL-MCNC: 58 MG/DL (ref 70–110)
HCT VFR BLD AUTO: 27.4 % (ref 37–48.5)
HGB BLD-MCNC: 8.5 G/DL (ref 12–16)
IMM GRANULOCYTES # BLD AUTO: 0.03 K/UL (ref 0–0.04)
IMM GRANULOCYTES NFR BLD AUTO: 0.4 % (ref 0–0.5)
INR PPP: 1.3 (ref 0.8–1.2)
LYMPHOCYTES # BLD AUTO: 1.6 K/UL (ref 1–4.8)
LYMPHOCYTES NFR BLD: 18.6 % (ref 18–48)
MAGNESIUM SERPL-MCNC: 1.8 MG/DL (ref 1.6–2.6)
MCH RBC QN AUTO: 23.4 PG (ref 27–31)
MCHC RBC AUTO-ENTMCNC: 31 G/DL (ref 32–36)
MCV RBC AUTO: 75 FL (ref 82–98)
MONOCYTES # BLD AUTO: 0.7 K/UL (ref 0.3–1)
MONOCYTES NFR BLD: 8.4 % (ref 4–15)
NEUTROPHILS # BLD AUTO: 5.7 K/UL (ref 1.8–7.7)
NEUTROPHILS NFR BLD: 68.3 % (ref 38–73)
NRBC BLD-RTO: 1 /100 WBC
PHOSPHATE SERPL-MCNC: 1.8 MG/DL (ref 2.7–4.5)
PLATELET # BLD AUTO: 216 K/UL (ref 150–450)
PMV BLD AUTO: 8.5 FL (ref 9.2–12.9)
POCT GLUCOSE: 89 MG/DL (ref 70–110)
POTASSIUM SERPL-SCNC: 3 MMOL/L (ref 3.5–5.1)
PROT SERPL-MCNC: 5.4 G/DL (ref 6–8.4)
PROTHROMBIN TIME: 13.9 SEC (ref 9–12.5)
RBC # BLD AUTO: 3.64 M/UL (ref 4–5.4)
SODIUM SERPL-SCNC: 135 MMOL/L (ref 136–145)
TRANS ERYTHROCYTES VOL PATIENT: NORMAL ML
TRANS ERYTHROCYTES VOL PATIENT: NORMAL ML
WBC # BLD AUTO: 8.35 K/UL (ref 3.9–12.7)

## 2023-11-29 PROCEDURE — 80053 COMPREHEN METABOLIC PANEL: CPT

## 2023-11-29 PROCEDURE — 85610 PROTHROMBIN TIME: CPT | Performed by: STUDENT IN AN ORGANIZED HEALTH CARE EDUCATION/TRAINING PROGRAM

## 2023-11-29 PROCEDURE — 83735 ASSAY OF MAGNESIUM: CPT

## 2023-11-29 PROCEDURE — 36415 COLL VENOUS BLD VENIPUNCTURE: CPT

## 2023-11-29 PROCEDURE — 63600175 PHARM REV CODE 636 W HCPCS: Performed by: STUDENT IN AN ORGANIZED HEALTH CARE EDUCATION/TRAINING PROGRAM

## 2023-11-29 PROCEDURE — 63600175 PHARM REV CODE 636 W HCPCS: Performed by: EMERGENCY MEDICINE

## 2023-11-29 PROCEDURE — 36415 COLL VENOUS BLD VENIPUNCTURE: CPT | Performed by: STUDENT IN AN ORGANIZED HEALTH CARE EDUCATION/TRAINING PROGRAM

## 2023-11-29 PROCEDURE — 25000003 PHARM REV CODE 250: Performed by: STUDENT IN AN ORGANIZED HEALTH CARE EDUCATION/TRAINING PROGRAM

## 2023-11-29 PROCEDURE — 25000003 PHARM REV CODE 250: Performed by: EMERGENCY MEDICINE

## 2023-11-29 PROCEDURE — 99232 SBSQ HOSP IP/OBS MODERATE 35: CPT | Mod: ,,,

## 2023-11-29 PROCEDURE — 20600001 HC STEP DOWN PRIVATE ROOM

## 2023-11-29 PROCEDURE — C9113 INJ PANTOPRAZOLE SODIUM, VIA: HCPCS | Performed by: STUDENT IN AN ORGANIZED HEALTH CARE EDUCATION/TRAINING PROGRAM

## 2023-11-29 PROCEDURE — 99232 PR SUBSEQUENT HOSPITAL CARE,LEVL II: ICD-10-PCS | Mod: ,,,

## 2023-11-29 PROCEDURE — 85025 COMPLETE CBC W/AUTO DIFF WBC: CPT | Performed by: STUDENT IN AN ORGANIZED HEALTH CARE EDUCATION/TRAINING PROGRAM

## 2023-11-29 PROCEDURE — C9113 INJ PANTOPRAZOLE SODIUM, VIA: HCPCS | Performed by: EMERGENCY MEDICINE

## 2023-11-29 PROCEDURE — 84100 ASSAY OF PHOSPHORUS: CPT

## 2023-11-29 RX ORDER — POTASSIUM CHLORIDE 20 MEQ/1
40 TABLET, EXTENDED RELEASE ORAL ONCE
Status: COMPLETED | OUTPATIENT
Start: 2023-11-29 | End: 2023-11-29

## 2023-11-29 RX ORDER — PANTOPRAZOLE SODIUM 40 MG/1
40 TABLET, DELAYED RELEASE ORAL DAILY
Status: DISCONTINUED | OUTPATIENT
Start: 2023-11-29 | End: 2023-12-08 | Stop reason: HOSPADM

## 2023-11-29 RX ORDER — NICOTINE 7MG/24HR
1 PATCH, TRANSDERMAL 24 HOURS TRANSDERMAL DAILY
Qty: 30 PATCH | Refills: 3 | Status: SHIPPED | OUTPATIENT
Start: 2023-11-29

## 2023-11-29 RX ORDER — PANTOPRAZOLE SODIUM 40 MG/10ML
80 INJECTION, POWDER, LYOPHILIZED, FOR SOLUTION INTRAVENOUS ONCE
Status: COMPLETED | OUTPATIENT
Start: 2023-11-29 | End: 2023-11-29

## 2023-11-29 RX ORDER — PANTOPRAZOLE SODIUM 40 MG/10ML
40 INJECTION, POWDER, LYOPHILIZED, FOR SOLUTION INTRAVENOUS 2 TIMES DAILY
Status: DISCONTINUED | OUTPATIENT
Start: 2023-11-29 | End: 2023-11-29

## 2023-11-29 RX ADMIN — POTASSIUM PHOSPHATE, MONOBASIC POTASSIUM PHOSPHATE, DIBASIC 15 MMOL: 224; 236 INJECTION, SOLUTION, CONCENTRATE INTRAVENOUS at 09:11

## 2023-11-29 RX ADMIN — POTASSIUM CHLORIDE 40 MEQ: 1500 TABLET, EXTENDED RELEASE ORAL at 09:11

## 2023-11-29 RX ADMIN — PANTOPRAZOLE SODIUM 40 MG: 40 TABLET, DELAYED RELEASE ORAL at 03:11

## 2023-11-29 RX ADMIN — PANTOPRAZOLE SODIUM 8 MG/HR: 40 INJECTION, POWDER, FOR SOLUTION INTRAVENOUS at 06:11

## 2023-11-29 RX ADMIN — CEFTRIAXONE 1 G: 1 INJECTION, POWDER, FOR SOLUTION INTRAMUSCULAR; INTRAVENOUS at 08:11

## 2023-11-29 RX ADMIN — PANTOPRAZOLE SODIUM 80 MG: 40 INJECTION, POWDER, FOR SOLUTION INTRAVENOUS at 08:11

## 2023-11-29 NOTE — PROGRESS NOTES
[facility] ([place] [address])  Gastroenterology  Progress Note    Patient Name: Ana Vasquez  MRN: [id number]  Admission Date: 11/27/2023  Hospital Length of Stay: 2 days  Code Status: Full Code   Attending Provider: Josefa Carreon MD  Consulting Provider: Franca Collazo NP  Primary Care Physician: Jennifer Schwartz MD  Principal Problem: Symptomatic anemia    Subjective:     Interval History: Followed up with patient for concerns of anemia without overt signs of bleeding since hospitalization. GI consulted for consideration of EGD/Colonoscopy for further evaluation of anemia while inpatient. Patient continued on longterm anticoagulation for DVTs (Eliquis). This has since been held. Received 3U pRBC with adequate response to transfusion and counts have remained stable. Patient noted to be confused on assessment.     Review of Systems   Unable to perform ROS: Mental status change     Objective:     Vital Signs (Most Recent):  Temp: 97.8 °F (36.6 °C) (11/29/23 0910)  Pulse: 64 (11/29/23 0910)  Resp: 18 (11/29/23 0910)  BP: (!) 104/51 (11/29/23 0910)  SpO2: 96 % (11/29/23 0910) Vital Signs (24h Range):  Temp:  [97.4 °F (36.3 °C)-98.2 °F (36.8 °C)] 97.8 °F (36.6 °C)  Pulse:  [64-94] 64  Resp:  [17-19] 18  SpO2:  [93 %-99 %] 96 %  BP: ()/(49-65) 104/51     Weight: 79.4 kg (175 lb 0.7 oz) (11/28/23 0518)  Body mass index is 29.13 kg/m².      Intake/Output Summary (Last 24 hours) at 11/29/2023 1010  Last data filed at 11/29/2023 0520  Gross per 24 hour   Intake 500 ml   Output --   Net 500 ml       Lines/Drains/Airways       Peripheral Intravenous Line  Duration                  Peripheral IV - Single Lumen 11/27/23 1131 18 G Anterior;Proximal;Right Forearm 1 day         Peripheral IV - Single Lumen 11/27/23 1141 18 G Anterior;Left Forearm 1 day                     Physical Exam  Vitals and nursing note reviewed.   Constitutional:       Appearance: She is not ill-appearing.   HENT:       Mouth/Throat:      Mouth: Mucous membranes are moist.      Pharynx: Oropharynx is clear.   Eyes:      General: No scleral icterus.  Abdominal:      General: Bowel sounds are normal. There is no distension.      Palpations: Abdomen is soft. There is no mass.   Skin:     General: Skin is warm and dry.      Capillary Refill: Capillary refill takes less than 2 seconds.      Coloration: Skin is not jaundiced or pale.   Neurological:      Mental Status: She is alert. She is disoriented.          Significant Labs:  CBC:   Recent Labs   Lab 11/27/23  2329 11/28/23  0812 11/29/23  0846   WBC 17.60* 10.74 8.35   HGB 9.1* 8.8* 8.5*   HCT 29.2* 27.6* 27.4*    248 216     CMP:   Recent Labs   Lab 11/29/23  0618   GLU 58*   CALCIUM 7.5*   ALBUMIN 1.8*   PROT 5.4*   *   K 3.0*   CO2 20*      BUN 26*   CREATININE 0.9   ALKPHOS 88   ALT 10   AST 19   BILITOT 0.9     Coagulation:   Recent Labs   Lab 11/29/23  0846   INR 1.3*         Significant Imaging:  Imaging results within the past 24 hours have been reviewed.  Assessment/Plan:     GI  Lower GI bleed  Ms. Ana Vasquez presented with acute anemia in setting of positive FOBT and is being treated for lower GI Bleed likely associated with polyp vs diverticula vs proctitis. PMH significan for <5mm polyp on colonoscopy in 2021. CAMERON showed no melena/hematochezia with positive FOBT. Anticoagulants/antiplatelet treatment held in setting of bleed. NSAID therapy discontinued/held. IVF resuscitation and IV PPI bolused/started. Type and screen ordered and CBC currently being trended with stable blood counts noted. No overt signs of GI bleeding noted since admission. Patient confused and multiple attempts to contact family to discuss further workup have not been successful. Multiple providers as well as  have attempted to contact NOK-daughter. NH contacted to try to obtain contact information which is the same information that we have on our records.      --Due to inability to contact NOK and patient without capacity to make medical decisions at this time; will defer procedure. Patient without overt signs of GI bleeding at this time.   --Will arrange for outpatient EGD/Colonoscopy at a later date if aligns with the [facility] with the family once able to be reached.   --Decision to restart anticoagulation to be determined by primary team. Would assess risk vs. Benefit of continuing Eliquis in the setting of possible GI bleed without identification of source.  --RBC transfusion as indicated if Hgb <7 g/dL or if Hgb <8 g/dL with ongoing bleed.   --PPI 40mg PO daily empirically since was previously on NSAIDs; please hold all NSAIDs  --Notify for significant change in patient's clinical status with concern for hemodynamic compromise in the setting of overt Lower GIB.   --If patient has large volume hematochezia with worsening hemodynamics; patient may warrant urgent CTA with possible IR intervention.     WBC (K/uL)   Date Value   11/29/2023 8.35   11/28/2023 10.74   11/27/2023 17.60 (H)     Hemoglobin (g/dL)   Date Value   11/29/2023 8.5 (L)   11/28/2023 8.8 (L)   11/27/2023 9.1 (L)     Platelets (K/uL)   Date Value   11/29/2023 216   11/28/2023 248   11/27/2023 302              Thank you for your consult. After careful review of labs and patient current status with the GI staff and fellow, it has been decided that I will sign off. Please contact us if you have any additional questions.    Franca Collazo NP  Gastroenterology  [facility] ([place]

## 2023-11-29 NOTE — PLAN OF CARE
11/29/23 0910   Post-Acute Status   Post-Acute Authorization Placement   Post-Acute Placement Status Set-up Complete/Auth obtained   Coverage MEDICARE - MEDICARE PART A & B -   Discharge Plan   Discharge Plan A Return to nursing home  ([facility] Phone: [phone])     CM updated and patient is intermediate with [facility].   was provided a contact number for the grand daughter [phone] and number is not working.  Will update Dr Lauri Russell RN  Case Management  [facility]  [phone]

## 2023-11-29 NOTE — SUBJECTIVE & OBJECTIVE
Subjective:     Interval History: Followed up with patient for concerns of anemia without overt signs of bleeding since hospitalization. GI consulted for consideration of EGD/Colonoscopy for further evaluation of anemia while inpatient. Patient continued on longterm anticoagulation for DVTs (Eliquis). This has since been held. Received 3U pRBC with adequate response to transfusion and counts have remained stable. Patient noted to be confused on assessment.     Review of Systems   Unable to perform ROS: Mental status change     Objective:     Vital Signs (Most Recent):  Temp: 97.8 °F (36.6 °C) (11/29/23 0910)  Pulse: 64 (11/29/23 0910)  Resp: 18 (11/29/23 0910)  BP: (!) 104/51 (11/29/23 0910)  SpO2: 96 % (11/29/23 0910) Vital Signs (24h Range):  Temp:  [97.4 °F (36.3 °C)-98.2 °F (36.8 °C)] 97.8 °F (36.6 °C)  Pulse:  [64-94] 64  Resp:  [17-19] 18  SpO2:  [93 %-99 %] 96 %  BP: ()/(49-65) 104/51     Weight: 79.4 kg (175 lb 0.7 oz) (11/28/23 0518)  Body mass index is 29.13 kg/m².      Intake/Output Summary (Last 24 hours) at 11/29/2023 1010  Last data filed at 11/29/2023 0520  Gross per 24 hour   Intake 500 ml   Output --   Net 500 ml       Lines/Drains/Airways       Peripheral Intravenous Line  Duration                  Peripheral IV - Single Lumen 11/27/23 1131 18 G Anterior;Proximal;Right Forearm 1 day         Peripheral IV - Single Lumen 11/27/23 1141 18 G Anterior;Left Forearm 1 day                     Physical Exam  Vitals and nursing note reviewed.   Constitutional:       Appearance: She is not ill-appearing.   HENT:      Mouth/Throat:      Mouth: Mucous membranes are moist.      Pharynx: Oropharynx is clear.   Eyes:      General: No scleral icterus.  Abdominal:      General: Bowel sounds are normal. There is no distension.      Palpations: Abdomen is soft. There is no mass.   Skin:     General: Skin is warm and dry.      Capillary Refill: Capillary refill takes less than 2 seconds.      Coloration: Skin is  not jaundiced or pale.   Neurological:      Mental Status: She is alert. She is disoriented.          Significant Labs:  CBC:   Recent Labs   Lab 11/27/23  2329 11/28/23  0812 11/29/23  0846   WBC 17.60* 10.74 8.35   HGB 9.1* 8.8* 8.5*   HCT 29.2* 27.6* 27.4*    248 216     CMP:   Recent Labs   Lab 11/29/23  0618   GLU 58*   CALCIUM 7.5*   ALBUMIN 1.8*   PROT 5.4*   *   K 3.0*   CO2 20*      BUN 26*   CREATININE 0.9   ALKPHOS 88   ALT 10   AST 19   BILITOT 0.9     Coagulation:   Recent Labs   Lab 11/29/23  0846   INR 1.3*         Significant Imaging:  Imaging results within the past 24 hours have been reviewed.

## 2023-11-29 NOTE — ASSESSMENT & PLAN NOTE
- FOBT positive   - Hgb 4 on admit, baseline around ~11 2 years ago  - continue IV protonix infusion  - GI consulted, appreciate assistance  - Type and screen, blood consent obtained  - CBCq 8h.  Transfuse for Hemoglobin <7  - Coagulopathy goals:  Plt >50, INR <1.5  - GI recommend scope, but unable to contact family for consent, given this recommend outpt scope at later time   - H/H stable post transfusion and no blood in BM noted by nursing, however H/H slowly decreasing   - cont PO PPI  - refer to GI outpt for scope in near future once family able to be contacted

## 2023-11-29 NOTE — PLAN OF CARE
Problem: Adult Inpatient Plan of Care  Goal: Readiness for Transition of Care  Outcome: Ongoing, Progressing     Problem: Infection  Goal: Absence of Infection Signs and Symptoms  Intervention: Prevent or Manage Infection  Flowsheets (Taken 11/29/2023 3894)  Fever Reduction/Comfort Measures:   lightweight clothing   lightweight bedding   fluid intake increased  Infection Management: aseptic technique maintained  Isolation Precautions: precautions maintained

## 2023-11-29 NOTE — ASSESSMENT & PLAN NOTE
Patient's anemia is currently controlled. Has received 3 units of PRBCs on 11/27 . Etiology likely d/t acute blood loss concern for GIB  Current CBC reviewed-   Lab Results   Component Value Date    HGB 8.5 (L) 11/29/2023    HCT 27.4 (L) 11/29/2023     Monitor serial CBC and transfuse if patient becomes hemodynamically unstable, symptomatic or H/H drops below 7/21.

## 2023-11-29 NOTE — SUBJECTIVE & OBJECTIVE
Interval History: Patient reports no concerns. Unable to contact family for consent for scope. Contacted NH who have the same numbers on file for family, which are nonworking numbers. Patient reports keeping emergency numbers in her cigarette case, at the NH. Will re attempt calling NH to get family numbers.     H/H stable >7, but slowly declining after transfusion.   Nurse denies seeing any blood in stool.     Ucx GNR, awaiting speciation, cont CTX pending cultures.     Risk/benefit for restarting eliquis, repeat u/s to eval clot burden.     Review of Systems   Constitutional:  Negative for fever.   Gastrointestinal:  Negative for abdominal pain, blood in stool, nausea and vomiting.     Objective:     Vital Signs (Most Recent):  Temp: 98 °F (36.7 °C) (11/29/23 1237)  Pulse: 98 (11/29/23 1237)  Resp: 18 (11/29/23 1237)  BP: (!) 95/53 (11/29/23 1237)  SpO2: 96 % (11/29/23 0910) Vital Signs (24h Range):  Temp:  [97.5 °F (36.4 °C)-98.2 °F (36.8 °C)] 98 °F (36.7 °C)  Pulse:  [64-98] 98  Resp:  [17-18] 18  SpO2:  [93 %-99 %] 96 %  BP: ()/(49-64) 95/53     Weight: 79.4 kg (175 lb 0.7 oz)  Body mass index is 29.13 kg/m².    Intake/Output Summary (Last 24 hours) at 11/29/2023 1359  Last data filed at 11/29/2023 0520  Gross per 24 hour   Intake 500 ml   Output --   Net 500 ml           Physical Exam  Vitals and nursing note reviewed.   Constitutional:       General: She is not in acute distress.     Appearance: She is well-developed. She is obese.   HENT:      Head: Normocephalic and atraumatic.      Nose: Nose normal.      Mouth/Throat:      Mouth: Mucous membranes are moist.      Pharynx: Oropharynx is clear.   Eyes:      Extraocular Movements: Extraocular movements intact.      Pupils: Pupils are equal, round, and reactive to light.   Cardiovascular:      Rate and Rhythm: Normal rate and regular rhythm.   Pulmonary:      Effort: Pulmonary effort is normal.      Breath sounds: Normal breath sounds.   Abdominal:       General: Abdomen is flat. Bowel sounds are normal. There is no distension.      Palpations: Abdomen is soft.      Tenderness: There is no abdominal tenderness.   Genitourinary:     Comments: No melena or blood reported from nursing  Musculoskeletal:         General: No tenderness.   Skin:     General: Skin is warm and dry.   Neurological:      Mental Status: She is alert.      Comments: Oriented to person and place, reports she doesn't keep up with the date anymore, unable to provide daughter's numbers, little insight into condition    Psychiatric:         Behavior: Behavior normal.             Significant Labs: All pertinent labs within the past 24 hours have been reviewed.    Significant Imaging: I have reviewed all pertinent imaging results/findings within the past 24 hours.

## 2023-11-29 NOTE — ASSESSMENT & PLAN NOTE
Acute blood loss anemia  Patient's anemia is currently controlled. Has received 3 units of PRBCs on 11/27 . Etiology likely d/t acute blood loss which was from concern for GIB  Current CBC reviewed-   Lab Results   Component Value Date    HGB 8.5 (L) 11/29/2023    HCT 27.4 (L) 11/29/2023     Monitor serial CBC and transfuse if patient becomes hemodynamically unstable, symptomatic or H/H drops below 7/21.  - last known Hgb was ~11 in 2021, pt has Eliquis on med list but she cannot say if she is taking it  - guaiac positive  - Hgb 4.6>> receiving 2 units in ED, another unit on floor  - anemia panel neg  - CBC q8  - transfuse Hgb <7  - GI consulted, appreciate assistance   - low threshold for ICU care if becomes hemodynamically unstable    - GI recommend scope but given patient unable to consent herself and unable to contact family elected for outpt scope   - cont PPI   - will cont to try and get family contact, emergency contact numbers on file are nonworking. Unfortunately no family has come by and NH has the same nonworking numbers on file. However patient reports she keeps numbers in her purse at the NH so will re attempt to contact NH to get family contact

## 2023-11-29 NOTE — ASSESSMENT & PLAN NOTE
Patient has hypokalemia which is Acute and currently controlled. Most recent potassium levels reviewed-   Lab Results   Component Value Date    K 3.0 (L) 11/29/2023   . Will continue potassium replacement per protocol and recheck repeat levels after replacement completed.

## 2023-11-29 NOTE — PROGRESS NOTES
"[facility] ([address] Medicine  Progress Note    Patient Name: Ana Vasquez  MRN: [id number]  Patient Class: IP- Inpatient   Admission Date: 11/27/2023  Length of Stay: 2 days  Attending Physician: Josefa Carreon MD  Primary Care Provider: Jennifer Schwartz MD        Subjective:     Principal Problem:Symptomatic anemia        HPI:  Ana Vasquez is a 83 y.o. female with a hx of DVT and HTN presents to the ED from her NH for shortness of breath with exertion. Patient is a poor historian. Needing frequent redirection during the interview. Patient unable to tell me why she is here. When asked about her needing blood, she states "she always has low blood level." Per ED provider, " She reports someone in her nursing home told her that her blood levels were low but she always has "low blood levels". She denies change in recent medications and thinks she continues to take Eliquis.  She denies noting recent black or tarry stools, gnawing epigastric pain, nausea, vomiting, chest pain arrest, swelling of the extremities, fever, cough.  She denies knowledge of past gastrointestinal bleeding episodes.  She thinks she takes a multivitamin but is unsure if it may contain iron." Patient denies any rectal bleeding, hematuria and hemoptysis on my exam. Denies any further complaints. Endorses improvement of shortness of breath.     ED: AF, tachycardic and hypotensive on admission. Hgb 4.3. K 3.3. . CXR showed no acute process. 2 units pRBCs ordered in ED and transfused. GI consulted. Given IV protonix in ED.     Overview/Hospital Course:  Patient admitted for anemia 2/2 possible GIB as patient on eliquis at home. AC held. Transfused 3 units,  trending H/H. GI consulted. No melena or bleeding noted. UA infectious and started on CTX, Ucx GNR awaiting speciation. Unable to contact family, contact numbers on chart are out of service, contacted NH who have the same numbers on file. Attempting to " contact family for consent for scope as patient with underlying cognitive decline and unable to consent herself per GI. If unable to contact family, plan for scope outpt. H/H stable and no further bleeding. Risk benefit for restarting eliquis, for prior DVT in 2021. LE u/s ordered to eval DVT.     Interval History: Patient reports no concerns. Unable to contact family for consent for scope. Contacted NH who have the same numbers on file for family, which are nonworking numbers. Patient reports keeping emergency numbers in her cigarette case, at the NH. Will re attempt calling NH to get family numbers.     H/H stable >7, but slowly declining after transfusion.   Nurse denies seeing any blood in stool.     Ucx GNR, awaiting speciation, cont CTX pending cultures.     Risk/benefit for restarting eliquis, repeat u/s to eval clot burden.     Review of Systems   Constitutional:  Negative for fever.   Gastrointestinal:  Negative for abdominal pain, blood in stool, nausea and vomiting.     Objective:     Vital Signs (Most Recent):  Temp: 98 °F (36.7 °C) (11/29/23 1237)  Pulse: 98 (11/29/23 1237)  Resp: 18 (11/29/23 1237)  BP: (!) 95/53 (11/29/23 1237)  SpO2: 96 % (11/29/23 0910) Vital Signs (24h Range):  Temp:  [97.5 °F (36.4 °C)-98.2 °F (36.8 °C)] 98 °F (36.7 °C)  Pulse:  [64-98] 98  Resp:  [17-18] 18  SpO2:  [93 %-99 %] 96 %  BP: ()/(49-64) 95/53     Weight: 79.4 kg (175 lb 0.7 oz)  Body mass index is 29.13 kg/m².    Intake/Output Summary (Last 24 hours) at 11/29/2023 1359  Last data filed at 11/29/2023 0520  Gross per 24 hour   Intake 500 ml   Output --   Net 500 ml           Physical Exam  Vitals and nursing note reviewed.   Constitutional:       General: She is not in acute distress.     Appearance: She is well-developed. She is obese.   HENT:      Head: Normocephalic and atraumatic.      Nose: Nose normal.      Mouth/Throat:      Mouth: Mucous membranes are moist.      Pharynx: Oropharynx is clear.   Eyes:       Extraocular Movements: Extraocular movements intact.      Pupils: Pupils are equal, round, and reactive to light.   Cardiovascular:      Rate and Rhythm: Normal rate and regular rhythm.   Pulmonary:      Effort: Pulmonary effort is normal.      Breath sounds: Normal breath sounds.   Abdominal:      General: Abdomen is flat. Bowel sounds are normal. There is no distension.      Palpations: Abdomen is soft.      Tenderness: There is no abdominal tenderness.   Genitourinary:     Comments: No melena or blood reported from nursing  Musculoskeletal:         General: No tenderness.   Skin:     General: Skin is warm and dry.   Neurological:      Mental Status: She is alert.      Comments: Oriented to person and place, reports she doesn't keep up with the date anymore, unable to provide daughter's numbers, little insight into condition    Psychiatric:         Behavior: Behavior normal.             Significant Labs: All pertinent labs within the past 24 hours have been reviewed.    Significant Imaging: I have reviewed all pertinent imaging results/findings within the past 24 hours.    Assessment/Plan:      * Symptomatic anemia  Acute blood loss anemia  Patient's anemia is currently controlled. Has received 3 units of PRBCs on 11/27 . Etiology likely d/t acute blood loss which was from concern for GIB  Current CBC reviewed-   Lab Results   Component Value Date    HGB 8.5 (L) 11/29/2023    HCT 27.4 (L) 11/29/2023     Monitor serial CBC and transfuse if patient becomes hemodynamically unstable, symptomatic or H/H drops below 7/21.  - last known Hgb was ~11 in 2021, pt has Eliquis on med list but she cannot say if she is taking it  - guaiac positive  - Hgb 4.6>> receiving 2 units in ED, another unit on floor  - anemia panel neg  - CBC q8  - transfuse Hgb <7  - GI consulted, appreciate assistance   - low threshold for ICU care if becomes hemodynamically unstable    - GI recommend scope but given patient unable to consent herself  and unable to contact family elected for outpt scope   - cont PPI   - will cont to try and get family contact, emergency contact numbers on file are nonworking. Unfortunately no family has come by and NH has the same nonworking numbers on file. However patient reports she keeps numbers in her purse at the NH so will re attempt to contact NH to get family contact     Acute cystitis  UA infectious, foul smelling  - Ucx GNR  - CTX pending cultures    Hypokalemia  Patient has hypokalemia which is Acute and currently controlled. Most recent potassium levels reviewed-   Lab Results   Component Value Date    K 3.0 (L) 11/29/2023   . Will continue potassium replacement per protocol and recheck repeat levels after replacement completed.     Lower GI bleed  - FOBT positive   - Hgb 4 on admit, baseline around ~11 2 years ago  - continue IV protonix infusion  - GI consulted, appreciate assistance  - Type and screen, blood consent obtained  - CBCq 8h.  Transfuse for Hemoglobin <7  - Coagulopathy goals:  Plt >50, INR <1.5  - GI recommend scope, but unable to contact family for consent, given this recommend outpt scope at later time   - H/H stable post transfusion and no blood in BM noted by nursing, however H/H slowly decreasing   - cont PO PPI  - refer to GI outpt for scope in near future once family able to be contacted    Acute blood loss anemia  Patient's anemia is currently controlled. Has received 3 units of PRBCs on 11/27 . Etiology likely d/t acute blood loss concern for GIB  Current CBC reviewed-   Lab Results   Component Value Date    HGB 8.5 (L) 11/29/2023    HCT 27.4 (L) 11/29/2023     Monitor serial CBC and transfuse if patient becomes hemodynamically unstable, symptomatic or H/H drops below 7/21.    Chronic deep vein thrombosis (DVT) of proximal vein of right lower extremity  - hx noted  - holding Eliquis in setting of acute blood loss  - will restart when clinically indicated  - will place sequential compression  devices     - LE u/s ordered to reeval clot to assist in risk benefit of restarting eliquis      Essential hypertension  - hx noted, hypotensive on admission  - monitor volume status/ hemodynamics closely with acute blood loss  - BP improved, monitor and adjust regimen as needed      VTE Risk Mitigation (From admission, onward)           Ordered     Place sequential compression device  Until discontinued         11/27/23 1608     IP VTE HIGH RISK PATIENT  Once         11/27/23 1412     Reason for No Pharmacological VTE Prophylaxis  Once        Question:  Reasons:  Answer:  Active Bleeding    11/27/23 1412                    Discharge Planning   BUBBA: 11/30/2023     Code Status: Full Code   Is the patient medically ready for discharge?: No    Reason for patient still in hospital (select all that apply): Patient trending condition and imaging   Discharge Plan A: [facility] Phone: [phone])                  Josefa Carreon MD  Department of Hospital Medicine   [facility] - Intensive Care ([employer]

## 2023-11-29 NOTE — TREATMENT PLAN
Gastroenterology Treatment Plan    Ana Vasquez is a 83 y.o. female admitted to hospital 11/27/2023 (Hospital Day: 2) due to Symptomatic anemia.     Interval History  Hgb stable. Brown stool in bed today.     Objective  Temp:  [97.4 °F (36.3 °C)-98.6 °F (37 °C)] 97.5 °F (36.4 °C) (11/28 1601)  Pulse:  [65-94] 85 (11/28 1626)  BP: ()/(42-68) 113/58 (11/28 1604)  Resp:  [17-21] 17 (11/28 1601)  SpO2:  [96 %-99 %] 97 % (11/28 1601)    Laboratory    Lab Results   Component Value Date    WBC 10.74 11/28/2023    HGB 8.8 (L) 11/28/2023    HCT 27.6 (L) 11/28/2023    MCV 74 (L) 11/28/2023     11/28/2023       Lab Results   Component Value Date     11/28/2023    K 3.5 11/28/2023     11/28/2023    CO2 23 11/28/2023    BUN 30 (H) 11/28/2023    CREATININE 0.9 11/28/2023    CALCIUM 7.7 (L) 11/28/2023       Lab Results   Component Value Date    ALBUMIN 1.9 (L) 11/28/2023    ALT 11 11/28/2023    AST 21 11/28/2023    ALKPHOS 93 11/28/2023    BILITOT 2.1 (H) 11/28/2023       Lab Results   Component Value Date    INR 1.3 (H) 11/28/2023    INR 1.2 07/21/2021       Assessment    Symptomatic Anemia   Ms. Ana Vasquez presented with Hgb of 4.3. CAMERON with no melena/hematochezia, but with positive FOBT. Anticoagulants/antiplatelet therapies held in setting of symptomatic anemia. GI consulted for endoscopic evaluation.     - S/p 3 units of prbc's with rise in Hgb from 4.3 -> 9.1 on 11/27  - Repeat Hgb on 11/28 at 8.8   - Brown stool on rectal, no overt signs of GIB per nursing   - Given profound anemia on admit, does warrant endoscopic evaluation- inpatient vs outpatient EGD/colonoscopy   - Patient is not able to meaningfully corroborate history or participate in decision making, message relayed to primary team, attempted to call daughter and grad daughter as noted in the chart, both numbers do not work. Relayed to primary team for assistance.   - Endoscopic plan pending discussion with family   - Trend Hgb  and transfuse for goal Hgb > 7, unless otherwise indicated  - Maintain IV access with 2 large bore IV's  - Intravascular resuscitation/support with IVF's   - Hold all NSAIDs and anticoagulants, unless contraindicated  - Bolus IV pantoprazole 80 mg followed by 40 mg BID  - Please correct any coagulopathy with platelets and FFP for goal of platelets >50K and INR <2.0  - Please notify GI team if there is significant change in patient's clinical status    Thank you for involving us in the care of Ana Vasquez. Please call with any additional concerns or questions.    Tommy Peña DO   Gastroenterology Fellow PGY- IV  [facility]

## 2023-11-29 NOTE — ASSESSMENT & PLAN NOTE
Ms. Ana Vasquez presented with acute anemia in setting of positive FOBT and is being treated for lower GI Bleed likely associated with polyp vs diverticula vs proctitis. PMH significan for <5mm polyp on colonoscopy in 2021. CAMERON showed no melena/hematochezia with positive FOBT. Anticoagulants/antiplatelet treatment held in setting of bleed. NSAID therapy discontinued/held. IVF resuscitation and IV PPI bolused/started. Type and screen ordered and CBC currently being trended with stable blood counts noted. No overt signs of GI bleeding noted since admission. Patient confused and multiple attempts to contact family to discuss further workup have not been successful. Multiple providers as well as  have attempted to contact NOK-daughter. NH contacted to try to obtain contact information which is the same information that we have on our records.     --Due to inability to contact NOK and patient without capacity to make medical decisions at this time; will defer procedure. Patient without overt signs of GI bleeding at this time.   --Will arrange for outpatient EGD/Colonoscopy at a later date if aligns with the GOC with the family once able to be reached.   --Decision to restart anticoagulation to be determined by primary team. Would assess risk vs. Benefit of continuing Eliquis in the setting of possible GI bleed without identification of source.  --RBC transfusion as indicated if Hgb <7 g/dL or if Hgb <8 g/dL with ongoing bleed.   --PPI 40mg PO daily empirically since was previously on NSAIDs; please hold all NSAIDs  --Notify for significant change in patient's clinical status with concern for hemodynamic compromise in the setting of overt Lower GIB.   --If patient has large volume hematochezia with worsening hemodynamics; patient may warrant urgent CTA with possible IR intervention.     WBC (K/uL)   Date Value   11/29/2023 8.35   11/28/2023 10.74   11/27/2023 17.60 (H)     Hemoglobin (g/dL)   Date Value    11/29/2023 8.5 (L)   11/28/2023 8.8 (L)   11/27/2023 9.1 (L)     Platelets (K/uL)   Date Value   11/29/2023 216   11/28/2023 248   11/27/2023 302

## 2023-11-29 NOTE — ASSESSMENT & PLAN NOTE
- hx noted  - holding Eliquis in setting of acute blood loss  - will restart when clinically indicated  - will place sequential compression devices     - LE u/s ordered to reeval clot to assist in risk benefit of restarting eliquis

## 2023-11-30 LAB
ALBUMIN SERPL BCP-MCNC: 1.5 G/DL (ref 3.5–5.2)
ALP SERPL-CCNC: 76 U/L (ref 55–135)
ALT SERPL W/O P-5'-P-CCNC: 8 U/L (ref 10–44)
ANION GAP SERPL CALC-SCNC: 7 MMOL/L (ref 8–16)
ANISOCYTOSIS BLD QL SMEAR: ABNORMAL
AST SERPL-CCNC: 18 U/L (ref 10–40)
BACTERIA UR CULT: ABNORMAL
BASOPHILS # BLD AUTO: 0.04 K/UL (ref 0–0.2)
BASOPHILS # BLD AUTO: 0.05 K/UL (ref 0–0.2)
BASOPHILS NFR BLD: 0.5 % (ref 0–1.9)
BASOPHILS NFR BLD: 0.6 % (ref 0–1.9)
BILIRUB SERPL-MCNC: 0.6 MG/DL (ref 0.1–1)
BUN SERPL-MCNC: 24 MG/DL (ref 8–23)
CALCIUM SERPL-MCNC: 7 MG/DL (ref 8.7–10.5)
CHLORIDE SERPL-SCNC: 108 MMOL/L (ref 95–110)
CO2 SERPL-SCNC: 19 MMOL/L (ref 23–29)
CREAT SERPL-MCNC: 0.9 MG/DL (ref 0.5–1.4)
DIFFERENTIAL METHOD: ABNORMAL
DIFFERENTIAL METHOD: ABNORMAL
EOSINOPHIL # BLD AUTO: 0.4 K/UL (ref 0–0.5)
EOSINOPHIL # BLD AUTO: 0.4 K/UL (ref 0–0.5)
EOSINOPHIL NFR BLD: 4.5 % (ref 0–8)
EOSINOPHIL NFR BLD: 4.5 % (ref 0–8)
ERYTHROCYTE [DISTWIDTH] IN BLOOD BY AUTOMATED COUNT: ABNORMAL % (ref 11.5–14.5)
ERYTHROCYTE [DISTWIDTH] IN BLOOD BY AUTOMATED COUNT: ABNORMAL % (ref 11.5–14.5)
EST. GFR  (NO RACE VARIABLE): >60 ML/MIN/1.73 M^2
GLUCOSE SERPL-MCNC: 76 MG/DL (ref 70–110)
HCT VFR BLD AUTO: 27.4 % (ref 37–48.5)
HCT VFR BLD AUTO: 28.3 % (ref 37–48.5)
HGB BLD-MCNC: 8.3 G/DL (ref 12–16)
HGB BLD-MCNC: 8.4 G/DL (ref 12–16)
HYPOCHROMIA BLD QL SMEAR: ABNORMAL
IMM GRANULOCYTES # BLD AUTO: 0.02 K/UL (ref 0–0.04)
IMM GRANULOCYTES # BLD AUTO: 0.03 K/UL (ref 0–0.04)
IMM GRANULOCYTES NFR BLD AUTO: 0.2 % (ref 0–0.5)
IMM GRANULOCYTES NFR BLD AUTO: 0.4 % (ref 0–0.5)
INR PPP: 1.4 (ref 0.8–1.2)
LYMPHOCYTES # BLD AUTO: 1.8 K/UL (ref 1–4.8)
LYMPHOCYTES # BLD AUTO: 2 K/UL (ref 1–4.8)
LYMPHOCYTES NFR BLD: 20.3 % (ref 18–48)
LYMPHOCYTES NFR BLD: 25.6 % (ref 18–48)
MAGNESIUM SERPL-MCNC: 1.7 MG/DL (ref 1.6–2.6)
MCH RBC QN AUTO: 23.2 PG (ref 27–31)
MCH RBC QN AUTO: 23.3 PG (ref 27–31)
MCHC RBC AUTO-ENTMCNC: 29.7 G/DL (ref 32–36)
MCHC RBC AUTO-ENTMCNC: 30.3 G/DL (ref 32–36)
MCV RBC AUTO: 77 FL (ref 82–98)
MCV RBC AUTO: 78 FL (ref 82–98)
MONOCYTES # BLD AUTO: 0.8 K/UL (ref 0.3–1)
MONOCYTES # BLD AUTO: 0.9 K/UL (ref 0.3–1)
MONOCYTES NFR BLD: 10.8 % (ref 4–15)
MONOCYTES NFR BLD: 9.7 % (ref 4–15)
NEUTROPHILS # BLD AUTO: 4.5 K/UL (ref 1.8–7.7)
NEUTROPHILS # BLD AUTO: 5.8 K/UL (ref 1.8–7.7)
NEUTROPHILS NFR BLD: 58.2 % (ref 38–73)
NEUTROPHILS NFR BLD: 64.7 % (ref 38–73)
NRBC BLD-RTO: 0 /100 WBC
NRBC BLD-RTO: 0 /100 WBC
OVALOCYTES BLD QL SMEAR: ABNORMAL
PHOSPHATE SERPL-MCNC: 2.3 MG/DL (ref 2.7–4.5)
PLATELET # BLD AUTO: 203 K/UL (ref 150–450)
PLATELET # BLD AUTO: 219 K/UL (ref 150–450)
PMV BLD AUTO: 8.6 FL (ref 9.2–12.9)
PMV BLD AUTO: 9.1 FL (ref 9.2–12.9)
POIKILOCYTOSIS BLD QL SMEAR: ABNORMAL
POLYCHROMASIA BLD QL SMEAR: ABNORMAL
POTASSIUM SERPL-SCNC: 3.6 MMOL/L (ref 3.5–5.1)
PROT SERPL-MCNC: 4.7 G/DL (ref 6–8.4)
PROTHROMBIN TIME: 14.3 SEC (ref 9–12.5)
RBC # BLD AUTO: 3.57 M/UL (ref 4–5.4)
RBC # BLD AUTO: 3.61 M/UL (ref 4–5.4)
SCHISTOCYTES BLD QL SMEAR: ABNORMAL
SCHISTOCYTES BLD QL SMEAR: PRESENT
SODIUM SERPL-SCNC: 134 MMOL/L (ref 136–145)
SPHEROCYTES BLD QL SMEAR: ABNORMAL
WBC # BLD AUTO: 7.72 K/UL (ref 3.9–12.7)
WBC # BLD AUTO: 8.95 K/UL (ref 3.9–12.7)

## 2023-11-30 PROCEDURE — 80053 COMPREHEN METABOLIC PANEL: CPT

## 2023-11-30 PROCEDURE — 85610 PROTHROMBIN TIME: CPT | Performed by: STUDENT IN AN ORGANIZED HEALTH CARE EDUCATION/TRAINING PROGRAM

## 2023-11-30 PROCEDURE — 36415 COLL VENOUS BLD VENIPUNCTURE: CPT | Performed by: STUDENT IN AN ORGANIZED HEALTH CARE EDUCATION/TRAINING PROGRAM

## 2023-11-30 PROCEDURE — 63600175 PHARM REV CODE 636 W HCPCS: Performed by: STUDENT IN AN ORGANIZED HEALTH CARE EDUCATION/TRAINING PROGRAM

## 2023-11-30 PROCEDURE — 85025 COMPLETE CBC W/AUTO DIFF WBC: CPT | Mod: 91 | Performed by: STUDENT IN AN ORGANIZED HEALTH CARE EDUCATION/TRAINING PROGRAM

## 2023-11-30 PROCEDURE — 83735 ASSAY OF MAGNESIUM: CPT

## 2023-11-30 PROCEDURE — 25000003 PHARM REV CODE 250: Performed by: STUDENT IN AN ORGANIZED HEALTH CARE EDUCATION/TRAINING PROGRAM

## 2023-11-30 PROCEDURE — 20600001 HC STEP DOWN PRIVATE ROOM

## 2023-11-30 PROCEDURE — 84100 ASSAY OF PHOSPHORUS: CPT

## 2023-11-30 PROCEDURE — 85025 COMPLETE CBC W/AUTO DIFF WBC: CPT | Performed by: STUDENT IN AN ORGANIZED HEALTH CARE EDUCATION/TRAINING PROGRAM

## 2023-11-30 RX ADMIN — PANTOPRAZOLE SODIUM 40 MG: 40 TABLET, DELAYED RELEASE ORAL at 08:11

## 2023-11-30 RX ADMIN — CEFTRIAXONE 1 G: 1 INJECTION, POWDER, FOR SOLUTION INTRAMUSCULAR; INTRAVENOUS at 10:11

## 2023-11-30 NOTE — SUBJECTIVE & OBJECTIVE
Interval History: see above      Objective:     Vital Signs (Most Recent):  Temp: 98.6 °F (37 °C) (11/30/23 1146)  Pulse: 97 (11/30/23 1158)  Resp: 17 (11/30/23 1146)  BP: (!) 99/53 (11/30/23 1158)  SpO2: 98 % (11/30/23 1146) Vital Signs (24h Range):  Temp:  [97.5 °F (36.4 °C)-98.7 °F (37.1 °C)] 98.6 °F (37 °C)  Pulse:  [] 97  Resp:  [16-18] 17  SpO2:  [98 %-100 %] 98 %  BP: ()/(48-56) 99/53     Weight: 79.4 kg (175 lb 0.7 oz)  Body mass index is 29.13 kg/m².    Intake/Output Summary (Last 24 hours) at 11/30/2023 1345  Last data filed at 11/29/2023 1543  Gross per 24 hour   Intake --   Output 1 ml   Net -1 ml         Physical Exam  Vitals and nursing note reviewed.   Constitutional:       General: She is not in acute distress.     Appearance: She is well-developed. She is obese.   HENT:      Head: Normocephalic and atraumatic.      Nose: Nose normal.      Mouth/Throat:      Mouth: Mucous membranes are moist.      Pharynx: Oropharynx is clear.   Eyes:      Extraocular Movements: Extraocular movements intact.      Pupils: Pupils are equal, round, and reactive to light.   Cardiovascular:      Rate and Rhythm: Normal rate and regular rhythm.   Pulmonary:      Effort: Pulmonary effort is normal.      Breath sounds: Normal breath sounds.   Abdominal:      General: Abdomen is flat. Bowel sounds are normal. There is no distension.      Palpations: Abdomen is soft.      Tenderness: There is no abdominal tenderness.   Genitourinary:     Comments: Maroon colored stool reported by nursing  Musculoskeletal:         General: No tenderness.   Skin:     General: Skin is warm and dry.   Neurological:      Mental Status: She is alert.      Comments: Oriented to person, place, and year. Reports she doesn't keep up with the date anymore, unable to provide daughter's numbers.    Psychiatric:         Behavior: Behavior normal.             Significant Labs: All pertinent labs within the past 24 hours have been  reviewed.    Significant Imaging: I have reviewed all pertinent imaging results/findings within the past 24 hours.

## 2023-11-30 NOTE — ASSESSMENT & PLAN NOTE
Patient's anemia is currently controlled. Has received 3 units of PRBCs on 11/27 . Etiology likely d/t acute blood loss concern for GIB  Current CBC reviewed-   Lab Results   Component Value Date    HGB 8.3 (L) 11/30/2023    HCT 27.4 (L) 11/30/2023     Monitor serial CBC and transfuse if patient becomes hemodynamically unstable, symptomatic or H/H drops below 7/21.    Trending down slowly. Maroon colored stool. Reaching out to GI

## 2023-11-30 NOTE — CONSULTS
[facility] - [facility] ([place] [address])  Wound Care    Patient Name:  Ana Vasquez   MRN:  [id number]  Date: 11/30/2023  Diagnosis: Symptomatic anemia    History:     Past Medical History:   Diagnosis Date    Anticoagulant long-term use        Social History     Socioeconomic History    Marital status:     Number of children: 4   Occupational History     Comment: Former      Tobacco Use    Smoking status: Every Day     Current packs/day: 0.50     Average packs/day: 0.5 packs/day for 66.0 years (33.0 ttl pk-yrs)     Types: Cigarettes    Smokeless tobacco: Never    Tobacco comments:     1 pack every 3 days   Substance and Sexual Activity    Alcohol use: Not Currently     Comment: occasionally    Drug use: Never    Sexual activity: Not Currently     Social Determinants of Health     Financial Resource Strain: Low Risk  (11/28/2023)    Overall Financial Resource Strain (CARDIA)     Difficulty of Paying Living Expenses: Not hard at all   Food Insecurity: No Food Insecurity (11/28/2023)    Hunger Vital Sign     Worried About Running Out of Food in the Last Year: Never true     Ran Out of Food in the Last Year: Never true   Transportation Needs: No Transportation Needs (11/28/2023)    PRAPARE - Transportation     Lack of Transportation (Medical): No     Lack of Transportation (Non-Medical): No   Physical Activity: Inactive (11/28/2023)    Exercise Vital Sign     Days of Exercise per Week: 0 days     Minutes of Exercise per Session: 0 min   Stress: No Stress Concern Present (11/28/2023)    [place] [employer] - Occupational Stress Questionnaire     Feeling of Stress : Only a little   Social Connections: Socially Isolated (11/28/2023)    Social Connection and Isolation Panel [NHANES]     Frequency of Communication with Friends and Family: Never     Frequency of Social Gatherings with Friends and Family: Never     Attends Church Services: Never     Active Member of Clubs or  Organizations: No     Attends Club or Organization Meetings: Never     Marital Status:    Housing Stability: Unknown (11/28/2023)    Housing Stability Vital Sign     Unable to Pay for Housing in the Last Year: No     Unstable Housing in the Last Year: No       Precautions:     Allergies as of 11/27/2023    (No Known Allergies)       [facility] Assessment Details/Treatment   Patient seen for wound care consultation.   Reviewed chart for this encounter.   See Flow Sheet for findings.    Pt sitting up in bed, agreed to assessment. Pt had loose large BM, bedside RN present and assisted in cleaning pt using purple bath wipes. No open wounds noted. Pt stated she is incontinent of stool and urine.     RECOMMENDATIONS:  Keep clean and dry   Applied Triad for moisture barrier     Discussed POC with patient and primary nurse.   See EMR for orders & patient education.    Discussed nutrition and the role of protein in wound healing with the patient. Instructed patient to optimize protein for wound healing.    Nursing to continue care & monitoring.  Nursing to maintain pressure injury prevention interventions.    Orders placed  WC sign off -- no open wound noted   11/30/23 1000   WOCN Assessment   WOCN Total Time (mins) 30   Visit Date 11/30/23   Visit Time 1000   Consult Type New   WOCN Speciality Wound   Intervention assessed;changed;chart review   Teaching on-going        Altered Skin Integrity 11/28/23 1108 Left Vagina Skin Tear Partial thickness tissue loss. Shallow open ulcer with a red or pink wound bed, without slough. Intact or Open/Ruptured Serum-filled blister.   Date First Assessed/Time First Assessed: 11/28/23 1108   Altered Skin Integrity Present on Admission - Did Patient arrive to the hospital with altered skin?: suspected hospital acquired  Side: Left  Location: Vagina  Primary Wound Type: Skin Tear  Brett...   Wound Image    Wound Length (cm) 0 cm   Wound Width (cm) 0 cm   Wound Depth (cm) 0 cm   Wound Volume  (cm^3) 0 cm^3   Wound Surface Area (cm^2) 0 cm^2   Care Applied:;Skin Barrier   Dressing Change Due 11/30/23

## 2023-11-30 NOTE — CARE UPDATE
"Gastroenterology Care Update Note:     Endoscopy was deferred on 11/29 as the patient was altered and there were no ongoing signs of overt GI bleeding in the setting of a stable Hgb. Additionally, unable to make contact with family to provide consent for non-urgent procedure. Plan for expedited outpatient EGD/colon.     Contacted by primary given concerns for ongoing active GI bleeding with Hgb trend from 8.5 -> 8.3 in the last 24 hours. Nursing noted concerns for GI bleeding with hematin in liquid stool "with the smell of blood"; picture uploaded to media tab.     On my evaluation today, the patient has orange colored, strongly malodorous stool. There does not appear to be overt blood in that there is no evidence of bright red blood, maroon colored stool or blood clots present. It is possible that there is hematin mixed in her stool.     The patient does warrant an endoscopic evaluation. However, in an attempt to consent her, she remains disoriented and not meaningfully able to participate in the consent process.     Would recommend the primary team make continued efforts to reach MPOA as this will be the rate limiting step at this time.     Tommy Peña, DO  Gastroenterology PGY-4     "

## 2023-11-30 NOTE — PHYSICIAN QUERY
PT Name: Ana Vasquez  MR #: [id number]  DOCUMENTATION CLARIFICATION      CDS/: Faith Yu RN BSN              Contact information: [email or web address]    This form is a permanent document in the medical record.      Query Date: November 30, 2023    By submitting this query, we are merely seeking further clarification of documentation. Please utilize your independent clinical judgment when addressing the question(s) below.    The Medical Record contains the following:   Indicators  Supporting Clinical Findings Location in Medical Record   x PT        INR        PTT  11/28/23 05:16 11/29/23 08:46 11/30/23 04:30   Protime 13.4 (H) 13.9 (H) 14.3 (H)   INR 1.3 (H) 1.3 (H) 1.4 (H)      LAB    Platelets      Coagulopathy or Coagulation Defect documented     x Acute/Chronic Illness hx of DVT and HTN    Lower GI bleed  Chronic deep vein thrombosis (DVT) of proximal vein of right lower extremity  Symptomatic anemia  Acute blood loss anemia  Hypokalemia      Anticoagulant long-term use     H&P, 11/27                  GI consult, 11/27   x Treatment - holding Eliquis in setting of acute blood loss     - Hgb 4 on admit, baseline around ~11 2 years ago  - continue IV protonix infusion  - GI consulted, appreciate assistance  - Type and screen, blood consent obtained  - CBCq 8h.  Transfuse for Hemoglobin <7  - Coagulopathy goals:  Plt >50, INR <1.5  - remain NPO at this time    --Correct any coagulopathy with platelets and FFP to a goal of platelets >50K and INR <2.0.     - LE u/s ordered to reeval clot to assist in risk benefit of restarting eliquis    H&P, 11/27                    GI consult, 11/27    HM, PN, 11/29   x Other - last known Hgb was ~11 in 2021     presented to ED in 11/18/23 for BRB found in diaper by Pt's daughter. GI bleed attributed to diverticulosis vs proctitis at that time. Pt was continued on eliquis on discharge for history of DVT.     admitted for anemia 2/2 possible GIB as patient on eliquis  at home. AC held    H&P, 11/27    GI consult, 11/27      HM, PN, 11/28     Provider, please specify diagnosis or diagnoses associated with above clinical findings.  [   ] Coagulation disorder Secondary to Anticoagulation Therapy     [   ] Coagulation deficiency unspecified     [   ] Other hematological diagnosis (please specify):_______   [ x ] Clinically Undetermined           Please document in your progress notes daily for the duration of treatment until resolved, and include in your discharge summary.    Form No. [id number]

## 2023-11-30 NOTE — PROGRESS NOTES
"[facility] ([address] Medicine  Progress Note    Patient Name: Ana Vasquez  MRN: [id number]  Patient Class: IP- Inpatient   Admission Date: 11/27/2023  Length of Stay: 3 days  Attending Physician: Madison Tubbs DO  Primary Care Provider: Jennifer Schwartz MD        Subjective:     Principal Problem:Symptomatic anemia        HPI:  Ana Vasquez is a 83 y.o. female with a hx of DVT and HTN presents to the ED from her NH for shortness of breath with exertion. Patient is a poor historian. Needing frequent redirection during the interview. Patient unable to tell me why she is here. When asked about her needing blood, she states "she always has low blood level." Per ED provider, " She reports someone in her nursing home told her that her blood levels were low but she always has "low blood levels". She denies change in recent medications and thinks she continues to take Eliquis.  She denies noting recent black or tarry stools, gnawing epigastric pain, nausea, vomiting, chest pain arrest, swelling of the extremities, fever, cough.  She denies knowledge of past gastrointestinal bleeding episodes.  She thinks she takes a multivitamin but is unsure if it may contain iron." Patient denies any rectal bleeding, hematuria and hemoptysis on my exam. Denies any further complaints. Endorses improvement of shortness of breath.     ED: AF, tachycardic and hypotensive on admission. Hgb 4.3. K 3.3. . CXR showed no acute process. 2 units pRBCs ordered in ED and transfused. GI consulted. Given IV protonix in ED.     Overview/Hospital Course:  Patient admitted for anemia 2/2 possible GIB as patient on eliquis at home. AC held. Transfused 3 units,  trending H/H. GI consulted. No melena or bleeding noted. UA infectious and started on CTX, Ucx GNR awaiting speciation. Unable to contact family, contact numbers on chart are out of service, contacted NH who have the same numbers on file. Attempting " to contact family for consent for scope as patient with underlying cognitive decline and unable to consent herself per GI. If unable to contact family, plan for scope outpt. H/H stable and no further bleeding. Risk benefit for restarting eliquis, for prior DVT in 2021. LE u/s ordered to eval DVT.     Pt is pleasant and was alert, oriented x3. She continues to report that she was living with her daughter prior to coming her, but records indicate living at NH. Otherwise, she is alert and oriented and is able to reason answering questions. Was in bed. Family not at bedside. All questions and concerns answered. RN reported maroon colored stool with smell typically associated with GI bleed. Reached out to GI.       Interval History: see above      Objective:     Vital Signs (Most Recent):  Temp: 98.6 °F (37 °C) (11/30/23 1146)  Pulse: 97 (11/30/23 1158)  Resp: 17 (11/30/23 1146)  BP: (!) 99/53 (11/30/23 1158)  SpO2: 98 % (11/30/23 1146) Vital Signs (24h Range):  Temp:  [97.5 °F (36.4 °C)-98.7 °F (37.1 °C)] 98.6 °F (37 °C)  Pulse:  [] 97  Resp:  [16-18] 17  SpO2:  [98 %-100 %] 98 %  BP: ()/(48-56) 99/53     Weight: 79.4 kg (175 lb 0.7 oz)  Body mass index is 29.13 kg/m².    Intake/Output Summary (Last 24 hours) at 11/30/2023 1345  Last data filed at 11/29/2023 1543  Gross per 24 hour   Intake --   Output 1 ml   Net -1 ml         Physical Exam  Vitals and nursing note reviewed.   Constitutional:       General: She is not in acute distress.     Appearance: She is well-developed. She is obese.   HENT:      Head: Normocephalic and atraumatic.      Nose: Nose normal.      Mouth/Throat:      Mouth: Mucous membranes are moist.      Pharynx: Oropharynx is clear.   Eyes:      Extraocular Movements: Extraocular movements intact.      Pupils: Pupils are equal, round, and reactive to light.   Cardiovascular:      Rate and Rhythm: Normal rate and regular rhythm.   Pulmonary:      Effort: Pulmonary effort is normal.       Breath sounds: Normal breath sounds.   Abdominal:      General: Abdomen is flat. Bowel sounds are normal. There is no distension.      Palpations: Abdomen is soft.      Tenderness: There is no abdominal tenderness.   Genitourinary:     Comments: Maroon colored stool reported by nursing  Musculoskeletal:         General: No tenderness.   Skin:     General: Skin is warm and dry.   Neurological:      Mental Status: She is alert.      Comments: Oriented to person, place, and year. Reports she doesn't keep up with the date anymore, unable to provide daughter's numbers.    Psychiatric:         Behavior: Behavior normal.             Significant Labs: All pertinent labs within the past 24 hours have been reviewed.    Significant Imaging: I have reviewed all pertinent imaging results/findings within the past 24 hours.    Assessment/Plan:      * Symptomatic anemia  Acute blood loss anemia  Patient's anemia is currently controlled. Has received 3 units of PRBCs on 11/27 . Etiology likely d/t acute blood loss which was from concern for GIB  Current CBC reviewed-   Lab Results   Component Value Date    HGB 8.5 (L) 11/29/2023    HCT 27.4 (L) 11/29/2023     Monitor serial CBC and transfuse if patient becomes hemodynamically unstable, symptomatic or H/H drops below 7/21.  - last known Hgb was ~11 in 2021, pt has Eliquis on med list but she cannot say if she is taking it  - guaiac positive  - Hgb 4.6>> receiving 2 units in ED, another unit on floor  - anemia panel neg  - CBC q8  - transfuse Hgb <7  - GI consulted, appreciate assistance   - low threshold for ICU care if becomes hemodynamically unstable    - GI recommend scope but given patient unable to consent herself and unable to contact family elected for outpt scope   - cont PPI   - will cont to try and get family contact, emergency contact numbers on file are nonworking. Unfortunately no family has come by and NH has the same nonworking numbers on file. However patient reports  she keeps numbers in her purse at the NH so will re attempt to contact NH to get family contact     Acute cystitis  UA infectious, foul smelling  - Ucx GNR  - CTX pending cultures    Hypokalemia  Patient has hypokalemia which is Acute and currently controlled. Most recent potassium levels reviewed-   Lab Results   Component Value Date    K 3.0 (L) 11/29/2023   . Will continue potassium replacement per protocol and recheck repeat levels after replacement completed.     Lower GI bleed  - FOBT positive   - Hgb 4 on admit, baseline around ~11 2 years ago  - continue IV protonix infusion  - GI consulted, appreciate assistance  - Type and screen, blood consent obtained  - CBCq 8h.  Transfuse for Hemoglobin <7  - Coagulopathy goals:  Plt >50, INR <1.5  - GI recommend scope, but unable to contact family for consent, given this recommend outpt scope at later time   - H/H stable post transfusion and no blood in BM noted by nursing, however H/H slowly decreasing   - cont PO PPI  - refer to GI outpt for scope in near future once family able to be contacted    Acute blood loss anemia  Patient's anemia is currently controlled. Has received 3 units of PRBCs on 11/27 . Etiology likely d/t acute blood loss concern for GIB  Current CBC reviewed-   Lab Results   Component Value Date    HGB 8.3 (L) 11/30/2023    HCT 27.4 (L) 11/30/2023     Monitor serial CBC and transfuse if patient becomes hemodynamically unstable, symptomatic or H/H drops below 7/21.    Trending down slowly. Maroon colored stool. Reaching out to GI     Chronic deep vein thrombosis (DVT) of proximal vein of right lower extremity  - hx noted  - holding Eliquis in setting of acute blood loss  - will restart when clinically indicated  - will place sequential compression devices     - LE u/s ordered to reeval clot to assist in risk benefit of restarting eliquis      Essential hypertension  - hx noted, hypotensive on admission  - monitor volume status/ hemodynamics closely  with acute blood loss  - BP improved, monitor and adjust regimen as needed      VTE Risk Mitigation (From admission, onward)           Ordered     Place sequential compression device  Until discontinued         11/27/23 1608     IP VTE HIGH RISK PATIENT  Once         11/27/23 1412     Reason for No Pharmacological VTE Prophylaxis  Once        Question:  Reasons:  Answer:  Active Bleeding    11/27/23 1412                    Discharge Planning   BUBBA: 12/5/2023     Code Status: Full Code   Is the patient medically ready for discharge?: No    Reason for patient still in hospital (select all that apply): Patient trending condition, Treatment, and Consult recommendations  Discharge Plan A: [facility] Phone: [phone])   Discharge Delays: None known at this time              Madison Tubbs DO  Department of Hospital Medicine   [facility] - Intensive Care ([place])

## 2023-11-30 NOTE — PLAN OF CARE
11/30/23 0846   Discharge Reassessment   Assessment Type Discharge Planning Reassessment   Did the patient's condition or plan change since previous assessment? No   Discharge Plan discussed with: Patient   Communicated BUBBA with patient/caregiver Yes   Discharge Plan A Return to nursing home  ([facility] Phone: [phone])   Discharge Plan B Return to Nursing Home  ([facility] Phone: [phone])   DME Needed Upon Discharge  none   Transition of Care Barriers None   Post-Acute Status   Post-Acute Authorization Placement   Post-Acute Placement Status Set-up Complete/Auth obtained  ([facility] Phone: [phone])   Coverage MEDICARE - MEDICARE PART A & B -   Hospital Resources/Appts/Education Provided Appointments scheduled and added to AVS   Discharge Delays None known at this time     CM met with patient/family to discuss any changes in discharge planning.  Patients plan is to  [facility] Phone: [phone]     No changes in DC plans. BUBBA:  12/05/23    CM will secure chat the medical physician for NH orders and patient does not need a COVID and will set up transport after orders are reviewed per NH.  Will provide the contact number    3:45 pm  CM called [facility] and spoke to a  , who provided the following information:     Brianna Oneil (Daughter)  [phone] (Mobile)     The above number is out of service.  CM received a phone number for the grand daughter Alyssia [phone] and this is not a working number. CM call the facility to speak with the patients assigned SW at [facility]: Lul.  The patient states  she up to date numbers located in a cigarette case in her room at the NH.     3:57 pm  CM sent an email to the admission coordinator to forward CM information to the patients assigned NH SW    Kira Russell RN  Case Management  [facility]  [phone]

## 2023-12-01 LAB
ALBUMIN SERPL BCP-MCNC: 1.5 G/DL (ref 3.5–5.2)
ALP SERPL-CCNC: 85 U/L (ref 55–135)
ALT SERPL W/O P-5'-P-CCNC: 10 U/L (ref 10–44)
ANION GAP SERPL CALC-SCNC: 5 MMOL/L (ref 8–16)
AST SERPL-CCNC: 21 U/L (ref 10–40)
BASOPHILS # BLD AUTO: 0.05 K/UL (ref 0–0.2)
BASOPHILS # BLD AUTO: 0.07 K/UL (ref 0–0.2)
BASOPHILS NFR BLD: 0.7 % (ref 0–1.9)
BASOPHILS NFR BLD: 0.9 % (ref 0–1.9)
BILIRUB SERPL-MCNC: 0.2 MG/DL (ref 0.1–1)
BUN SERPL-MCNC: 18 MG/DL (ref 8–23)
CALCIUM SERPL-MCNC: 7.2 MG/DL (ref 8.7–10.5)
CHLORIDE SERPL-SCNC: 109 MMOL/L (ref 95–110)
CO2 SERPL-SCNC: 20 MMOL/L (ref 23–29)
CREAT SERPL-MCNC: 0.9 MG/DL (ref 0.5–1.4)
DIFFERENTIAL METHOD: ABNORMAL
DIFFERENTIAL METHOD: ABNORMAL
EOSINOPHIL # BLD AUTO: 0.4 K/UL (ref 0–0.5)
EOSINOPHIL # BLD AUTO: 0.4 K/UL (ref 0–0.5)
EOSINOPHIL NFR BLD: 5.5 % (ref 0–8)
EOSINOPHIL NFR BLD: 6.1 % (ref 0–8)
ERYTHROCYTE [DISTWIDTH] IN BLOOD BY AUTOMATED COUNT: ABNORMAL % (ref 11.5–14.5)
ERYTHROCYTE [DISTWIDTH] IN BLOOD BY AUTOMATED COUNT: ABNORMAL % (ref 11.5–14.5)
EST. GFR  (NO RACE VARIABLE): >60 ML/MIN/1.73 M^2
GLUCOSE SERPL-MCNC: 87 MG/DL (ref 70–110)
HCT VFR BLD AUTO: 27.8 % (ref 37–48.5)
HCT VFR BLD AUTO: 28 % (ref 37–48.5)
HGB BLD-MCNC: 8.4 G/DL (ref 12–16)
HGB BLD-MCNC: 8.6 G/DL (ref 12–16)
IMM GRANULOCYTES # BLD AUTO: 0.03 K/UL (ref 0–0.04)
IMM GRANULOCYTES # BLD AUTO: 0.04 K/UL (ref 0–0.04)
IMM GRANULOCYTES NFR BLD AUTO: 0.4 % (ref 0–0.5)
IMM GRANULOCYTES NFR BLD AUTO: 0.5 % (ref 0–0.5)
INR PPP: 1.3 (ref 0.8–1.2)
LYMPHOCYTES # BLD AUTO: 1.9 K/UL (ref 1–4.8)
LYMPHOCYTES # BLD AUTO: 2 K/UL (ref 1–4.8)
LYMPHOCYTES NFR BLD: 24.9 % (ref 18–48)
LYMPHOCYTES NFR BLD: 26.8 % (ref 18–48)
MCH RBC QN AUTO: 24.1 PG (ref 27–31)
MCH RBC QN AUTO: 24.4 PG (ref 27–31)
MCHC RBC AUTO-ENTMCNC: 30.2 G/DL (ref 32–36)
MCHC RBC AUTO-ENTMCNC: 30.7 G/DL (ref 32–36)
MCV RBC AUTO: 79 FL (ref 82–98)
MCV RBC AUTO: 80 FL (ref 82–98)
MONOCYTES # BLD AUTO: 0.7 K/UL (ref 0.3–1)
MONOCYTES # BLD AUTO: 0.8 K/UL (ref 0.3–1)
MONOCYTES NFR BLD: 10.3 % (ref 4–15)
MONOCYTES NFR BLD: 9.9 % (ref 4–15)
NEUTROPHILS # BLD AUTO: 4 K/UL (ref 1.8–7.7)
NEUTROPHILS # BLD AUTO: 4.6 K/UL (ref 1.8–7.7)
NEUTROPHILS NFR BLD: 55.7 % (ref 38–73)
NEUTROPHILS NFR BLD: 58.3 % (ref 38–73)
NRBC BLD-RTO: 0 /100 WBC
NRBC BLD-RTO: 0 /100 WBC
PLATELET # BLD AUTO: 169 K/UL (ref 150–450)
PLATELET # BLD AUTO: 179 K/UL (ref 150–450)
PMV BLD AUTO: 8.6 FL (ref 9.2–12.9)
PMV BLD AUTO: 9.1 FL (ref 9.2–12.9)
POTASSIUM SERPL-SCNC: 3.4 MMOL/L (ref 3.5–5.1)
PROT SERPL-MCNC: 4.8 G/DL (ref 6–8.4)
PROTHROMBIN TIME: 13.9 SEC (ref 9–12.5)
RBC # BLD AUTO: 3.49 M/UL (ref 4–5.4)
RBC # BLD AUTO: 3.53 M/UL (ref 4–5.4)
SODIUM SERPL-SCNC: 134 MMOL/L (ref 136–145)
WBC # BLD AUTO: 7.09 K/UL (ref 3.9–12.7)
WBC # BLD AUTO: 7.87 K/UL (ref 3.9–12.7)

## 2023-12-01 PROCEDURE — 85610 PROTHROMBIN TIME: CPT | Performed by: STUDENT IN AN ORGANIZED HEALTH CARE EDUCATION/TRAINING PROGRAM

## 2023-12-01 PROCEDURE — 76937 US GUIDE VASCULAR ACCESS: CPT

## 2023-12-01 PROCEDURE — 63600175 PHARM REV CODE 636 W HCPCS: Performed by: STUDENT IN AN ORGANIZED HEALTH CARE EDUCATION/TRAINING PROGRAM

## 2023-12-01 PROCEDURE — 36415 COLL VENOUS BLD VENIPUNCTURE: CPT | Performed by: STUDENT IN AN ORGANIZED HEALTH CARE EDUCATION/TRAINING PROGRAM

## 2023-12-01 PROCEDURE — 85025 COMPLETE CBC W/AUTO DIFF WBC: CPT | Performed by: STUDENT IN AN ORGANIZED HEALTH CARE EDUCATION/TRAINING PROGRAM

## 2023-12-01 PROCEDURE — C1751 CATH, INF, PER/CENT/MIDLINE: HCPCS

## 2023-12-01 PROCEDURE — 25000003 PHARM REV CODE 250: Performed by: STUDENT IN AN ORGANIZED HEALTH CARE EDUCATION/TRAINING PROGRAM

## 2023-12-01 PROCEDURE — 36410 VNPNXR 3YR/> PHY/QHP DX/THER: CPT

## 2023-12-01 PROCEDURE — 80053 COMPREHEN METABOLIC PANEL: CPT | Performed by: STUDENT IN AN ORGANIZED HEALTH CARE EDUCATION/TRAINING PROGRAM

## 2023-12-01 PROCEDURE — 20600001 HC STEP DOWN PRIVATE ROOM

## 2023-12-01 RX ADMIN — PANTOPRAZOLE SODIUM 40 MG: 40 TABLET, DELAYED RELEASE ORAL at 08:12

## 2023-12-01 RX ADMIN — CEFTRIAXONE 1 G: 1 INJECTION, POWDER, FOR SOLUTION INTRAMUSCULAR; INTRAVENOUS at 08:12

## 2023-12-01 NOTE — CARE UPDATE
"Gastroenterology Care Update Note:     Endoscopy was deferred on 11/29 as the patient was altered and there were no ongoing signs of overt GI bleeding in the setting of a stable Hgb. Additionally, unable to make contact with family to provide consent for non-urgent procedure. Plan for expedited outpatient EGD/colon.     Contacted by primary given concerns for ongoing active GI bleeding with Hgb trend from 8.5 -> 8.3 in the last 24 hours. Nursing noted concerns for GI bleeding with hematin in liquid stool "with the smell of blood"; picture uploaded to media tab.     On my evaluation today, the patient has orange colored, strongly malodorous stool. There does not appear to be overt blood in that there is no evidence of bright red blood, maroon colored stool or blood clots present. It is possible that there is hematin mixed in her stool.     The patient does warrant an endoscopic evaluation. However, in an attempt to consent her, she remains disoriented and not meaningfully able to participate in the consent process.     Would recommend the primary team make continued efforts to reach MPOA as this will be the rate limiting step at this time.     12/1  Patient with stable Hgb and remains without overt signs of GI bleeding.     If patient remains inpatient, we can plan for an endoscopic  evaluation tentatively on Monday if primary/CM is able to sort out MPOA to provide consent as the patient is unable to.     If the patient is discharged, can place referral to ambulatory endo schedulers for outpatient EGD/Colon.     Tommy Peña, DO  Gastroenterology PGY-4     "

## 2023-12-01 NOTE — SUBJECTIVE & OBJECTIVE
Interval History: see above      Objective:     Vital Signs (Most Recent):  Temp: 98.3 °F (36.8 °C) (12/01/23 1535)  Pulse: 91 (12/01/23 1535)  Resp: 18 (12/01/23 1535)  BP: (!) 108/54 (12/01/23 1535)  SpO2: 97 % (12/01/23 1535) Vital Signs (24h Range):  Temp:  [97.9 °F (36.6 °C)-98.3 °F (36.8 °C)] 98.3 °F (36.8 °C)  Pulse:  [82-97] 91  Resp:  [17-19] 18  SpO2:  [97 %-98 %] 97 %  BP: (108-131)/(54-63) 108/54     Weight: 79.4 kg (175 lb 0.7 oz)  Body mass index is 29.13 kg/m².  No intake or output data in the 24 hours ending 12/01/23 1700      Physical Exam  Vitals and nursing note reviewed.   Constitutional:       General: She is not in acute distress.     Appearance: She is well-developed. She is obese.   HENT:      Head: Normocephalic and atraumatic.      Nose: Nose normal.      Mouth/Throat:      Mouth: Mucous membranes are moist.      Pharynx: Oropharynx is clear.   Eyes:      Extraocular Movements: Extraocular movements intact.      Pupils: Pupils are equal, round, and reactive to light.   Cardiovascular:      Rate and Rhythm: Normal rate and regular rhythm.   Pulmonary:      Effort: Pulmonary effort is normal.      Breath sounds: Normal breath sounds.   Abdominal:      General: Abdomen is flat. Bowel sounds are normal. There is no distension.      Palpations: Abdomen is soft.      Tenderness: There is no abdominal tenderness.   Genitourinary:     Comments: Maroon colored stool reported by nursing  Musculoskeletal:         General: No tenderness.   Skin:     General: Skin is warm and dry.   Neurological:      Mental Status: She is alert.      Comments: Oriented to person, place, and year. Reports she doesn't keep up with the date anymore, unable to provide daughter's numbers.    Psychiatric:         Behavior: Behavior normal.             Significant Labs: All pertinent labs within the past 24 hours have been reviewed.    Significant Imaging: I have reviewed all pertinent imaging results/findings within the past  24 hours.

## 2023-12-01 NOTE — PLAN OF CARE
Problem: Infection  Goal: Absence of Infection Signs and Symptoms  Outcome: Ongoing, Progressing     Problem: Skin Injury Risk Increased  Goal: Skin Health and Integrity  Outcome: Ongoing, Progressing     Problem: Adult Inpatient Plan of Care  Goal: Optimal Comfort and Wellbeing  Outcome: Ongoing, Progressing

## 2023-12-01 NOTE — PLAN OF CARE
CM spoke with SW from [facility] and could not find any contact numbers for the patients daughter or grand daughter and CM updated the medical team    Kira Russell RN  Case Management  [facility]  [phone]

## 2023-12-01 NOTE — PROGRESS NOTES
"[facility] ([address] Medicine  Progress Note    Patient Name: Ana Vasquez  MRN: [id number]  Patient Class: IP- Inpatient   Admission Date: 11/27/2023  Length of Stay: 4 days  Attending Physician: Madison Tubbs DO  Primary Care Provider: Jennifer Schwartz MD        Subjective:     Principal Problem:Symptomatic anemia        HPI:  Ana Vasquez is a 83 y.o. female with a hx of DVT and HTN presents to the ED from her NH for shortness of breath with exertion. Patient is a poor historian. Needing frequent redirection during the interview. Patient unable to tell me why she is here. When asked about her needing blood, she states "she always has low blood level." Per ED provider, " She reports someone in her nursing home told her that her blood levels were low but she always has "low blood levels". She denies change in recent medications and thinks she continues to take Eliquis.  She denies noting recent black or tarry stools, gnawing epigastric pain, nausea, vomiting, chest pain arrest, swelling of the extremities, fever, cough.  She denies knowledge of past gastrointestinal bleeding episodes.  She thinks she takes a multivitamin but is unsure if it may contain iron." Patient denies any rectal bleeding, hematuria and hemoptysis on my exam. Denies any further complaints. Endorses improvement of shortness of breath.     ED: AF, tachycardic and hypotensive on admission. Hgb 4.3. K 3.3. . CXR showed no acute process. 2 units pRBCs ordered in ED and transfused. GI consulted. Given IV protonix in ED.     Overview/Hospital Course:  Patient admitted for anemia 2/2 possible GIB as patient on eliquis at home. AC held. Transfused 3 units,  trending H/H. GI consulted. No melena or bleeding noted. UA infectious and started on CTX, Ucx GNR awaiting speciation. Unable to contact family, contact numbers on chart are out of service, contacted NH who have the same numbers on file. Attempting " to contact family for consent for scope as patient with underlying cognitive decline and unable to consent herself per GI. If unable to contact family, plan for scope outpt. H/H stable and no further bleeding. Risk benefit for restarting eliquis, for prior DVT in 2021. LE u/s ordered to eval DVT.     Discussed case with Gi, who is comfortable with DC and follow up outpatient.  has attempted to reach out to family without success. GI aware of this issue. Patient denies any complaints. RN reports that her stool is less maroon today. GI does not believe that stool is indicative of GI bleed and does not smell like GI bleed either, which is assuring. Appreciate assistance.       Interval History: see above      Objective:     Vital Signs (Most Recent):  Temp: 98.3 °F (36.8 °C) (12/01/23 1535)  Pulse: 91 (12/01/23 1535)  Resp: 18 (12/01/23 1535)  BP: (!) 108/54 (12/01/23 1535)  SpO2: 97 % (12/01/23 1535) Vital Signs (24h Range):  Temp:  [97.9 °F (36.6 °C)-98.3 °F (36.8 °C)] 98.3 °F (36.8 °C)  Pulse:  [82-97] 91  Resp:  [17-19] 18  SpO2:  [97 %-98 %] 97 %  BP: (108-131)/(54-63) 108/54     Weight: 79.4 kg (175 lb 0.7 oz)  Body mass index is 29.13 kg/m².  No intake or output data in the 24 hours ending 12/01/23 1700      Physical Exam  Vitals and nursing note reviewed.   Constitutional:       General: She is not in acute distress.     Appearance: She is well-developed. She is obese.   HENT:      Head: Normocephalic and atraumatic.      Nose: Nose normal.      Mouth/Throat:      Mouth: Mucous membranes are moist.      Pharynx: Oropharynx is clear.   Eyes:      Extraocular Movements: Extraocular movements intact.      Pupils: Pupils are equal, round, and reactive to light.   Cardiovascular:      Rate and Rhythm: Normal rate and regular rhythm.   Pulmonary:      Effort: Pulmonary effort is normal.      Breath sounds: Normal breath sounds.   Abdominal:      General: Abdomen is flat. Bowel sounds are normal. There is  no distension.      Palpations: Abdomen is soft.      Tenderness: There is no abdominal tenderness.   Genitourinary:     Comments: Maroon colored stool reported by nursing  Musculoskeletal:         General: No tenderness.   Skin:     General: Skin is warm and dry.   Neurological:      Mental Status: She is alert.      Comments: Oriented to person, place, and year. Reports she doesn't keep up with the date anymore, unable to provide daughter's numbers.    Psychiatric:         Behavior: Behavior normal.             Significant Labs: All pertinent labs within the past 24 hours have been reviewed.    Significant Imaging: I have reviewed all pertinent imaging results/findings within the past 24 hours.    Assessment/Plan:      * Symptomatic anemia  Acute blood loss anemia  Patient's anemia is currently controlled. Has received 3 units of PRBCs on 11/27 . Etiology likely d/t acute blood loss which was from concern for GIB  Current CBC reviewed-   Lab Results   Component Value Date    HGB 8.5 (L) 11/29/2023    HCT 27.4 (L) 11/29/2023     Monitor serial CBC and transfuse if patient becomes hemodynamically unstable, symptomatic or H/H drops below 7/21.  - last known Hgb was ~11 in 2021, pt has Eliquis on med list but she cannot say if she is taking it  - guaiac positive  - Hgb 4.6>> receiving 2 units in ED, another unit on floor  - anemia panel neg  - CBC q8  - transfuse Hgb <7  - GI consulted, appreciate assistance   - low threshold for ICU care if becomes hemodynamically unstable    - GI recommend scope but given patient unable to consent herself and unable to contact family elected for outpt scope   - cont PPI   - will cont to try and get family contact, emergency contact numbers on file are nonworking. Unfortunately no family has come by and NH has the same nonworking numbers on file. However patient reports she keeps numbers in her purse at the NH so will re attempt to contact NH to get family contact     Acute  cystitis  UA infectious, foul smelling  - Ucx GNR  - CTX pending cultures    Hypokalemia  Patient has hypokalemia which is Acute and currently controlled. Most recent potassium levels reviewed-   Lab Results   Component Value Date    K 3.0 (L) 11/29/2023   . Will continue potassium replacement per protocol and recheck repeat levels after replacement completed.     Lower GI bleed  - FOBT positive   - Hgb 4 on admit, baseline around ~11 2 years ago  - continue IV protonix infusion  - GI consulted, appreciate assistance  - Type and screen, blood consent obtained  - CBCq 8h.  Transfuse for Hemoglobin <7  - Coagulopathy goals:  Plt >50, INR <1.5  - GI recommend scope, but unable to contact family for consent, given this recommend outpt scope at later time   - H/H stable post transfusion and no blood in BM noted by nursing, however H/H slowly decreasing   - cont PO PPI  - refer to GI outpt for scope in near future once family able to be contacted    Acute blood loss anemia  Patient's anemia is currently controlled. Has received 3 units of PRBCs on 11/27 . Etiology likely d/t acute blood loss concern for GIB  Current CBC reviewed-   Lab Results   Component Value Date    HGB 8.3 (L) 11/30/2023    HCT 27.4 (L) 11/30/2023     Monitor serial CBC and transfuse if patient becomes hemodynamically unstable, symptomatic or H/H drops below 7/21.    Trending down slowly. Maroon colored stool. Reaching out to GI     Chronic deep vein thrombosis (DVT) of proximal vein of right lower extremity  - hx noted  - holding Eliquis in setting of acute blood loss  - will restart when clinically indicated  - will place sequential compression devices     - LE u/s ordered to reeval clot to assist in risk benefit of restarting eliquis      Essential hypertension  - hx noted, hypotensive on admission  - monitor volume status/ hemodynamics closely with acute blood loss  - BP improved, monitor and adjust regimen as needed      VTE Risk Mitigation (From  admission, onward)           Ordered     Place sequential compression device  Until discontinued         11/27/23 1608     IP VTE HIGH RISK PATIENT  Once         11/27/23 1412     Reason for No Pharmacological VTE Prophylaxis  Once        Question:  Reasons:  Answer:  Active Bleeding    11/27/23 1412                    Discharge Planning   BUBBA: 12/5/2023     Code Status: Full Code   Is the patient medically ready for discharge?: No    Reason for patient still in hospital (select all that apply): Patient trending condition and Treatment  Discharge Plan A: Return to long term ([facility] Phone: [phone])   Discharge Delays: None known at this time              Madison Tubbs DO  Department of Hospital Medicine   [facility] - Intensive Care ([place]-14)

## 2023-12-01 NOTE — PROCEDURES
RAPID RESPONSE VASCULAR ACCESS NOTE       Single lumen 20G, 10CM midline placed in the left cephalic vein. Needle advanced into the vessel under real time ultrasound guidance.    Max dwell date: 12/31/23   Lot number: [id number]

## 2023-12-02 PROBLEM — R60.0 LOWER EXTREMITY EDEMA: Status: ACTIVE | Noted: 2023-12-02

## 2023-12-02 LAB
ANISOCYTOSIS BLD QL SMEAR: ABNORMAL
BASOPHILS # BLD AUTO: 0.04 K/UL (ref 0–0.2)
BASOPHILS NFR BLD: 0.5 % (ref 0–1.9)
DIFFERENTIAL METHOD: ABNORMAL
EOSINOPHIL # BLD AUTO: 0.5 K/UL (ref 0–0.5)
EOSINOPHIL NFR BLD: 6 % (ref 0–8)
ERYTHROCYTE [DISTWIDTH] IN BLOOD BY AUTOMATED COUNT: ABNORMAL % (ref 11.5–14.5)
HCT VFR BLD AUTO: 30.9 % (ref 37–48.5)
HGB BLD-MCNC: 9.5 G/DL (ref 12–16)
HYPOCHROMIA BLD QL SMEAR: ABNORMAL
IMM GRANULOCYTES # BLD AUTO: 0.03 K/UL (ref 0–0.04)
IMM GRANULOCYTES NFR BLD AUTO: 0.4 % (ref 0–0.5)
LYMPHOCYTES # BLD AUTO: 1.7 K/UL (ref 1–4.8)
LYMPHOCYTES NFR BLD: 22 % (ref 18–48)
MCH RBC QN AUTO: 24.2 PG (ref 27–31)
MCHC RBC AUTO-ENTMCNC: 30.7 G/DL (ref 32–36)
MCV RBC AUTO: 79 FL (ref 82–98)
MONOCYTES # BLD AUTO: 0.7 K/UL (ref 0.3–1)
MONOCYTES NFR BLD: 8.5 % (ref 4–15)
NEUTROPHILS # BLD AUTO: 4.9 K/UL (ref 1.8–7.7)
NEUTROPHILS NFR BLD: 62.6 % (ref 38–73)
NRBC BLD-RTO: 0 /100 WBC
OVALOCYTES BLD QL SMEAR: ABNORMAL
PLATELET # BLD AUTO: 197 K/UL (ref 150–450)
PLATELET BLD QL SMEAR: ABNORMAL
PMV BLD AUTO: 9 FL (ref 9.2–12.9)
POIKILOCYTOSIS BLD QL SMEAR: SLIGHT
POLYCHROMASIA BLD QL SMEAR: ABNORMAL
RBC # BLD AUTO: 3.93 M/UL (ref 4–5.4)
SPHEROCYTES BLD QL SMEAR: ABNORMAL
TARGETS BLD QL SMEAR: ABNORMAL
WBC # BLD AUTO: 7.81 K/UL (ref 3.9–12.7)

## 2023-12-02 PROCEDURE — 20600001 HC STEP DOWN PRIVATE ROOM

## 2023-12-02 PROCEDURE — 36415 COLL VENOUS BLD VENIPUNCTURE: CPT | Performed by: STUDENT IN AN ORGANIZED HEALTH CARE EDUCATION/TRAINING PROGRAM

## 2023-12-02 PROCEDURE — 85025 COMPLETE CBC W/AUTO DIFF WBC: CPT | Performed by: STUDENT IN AN ORGANIZED HEALTH CARE EDUCATION/TRAINING PROGRAM

## 2023-12-02 PROCEDURE — 25000003 PHARM REV CODE 250: Performed by: STUDENT IN AN ORGANIZED HEALTH CARE EDUCATION/TRAINING PROGRAM

## 2023-12-02 RX ORDER — FUROSEMIDE 40 MG/1
40 TABLET ORAL 2 TIMES DAILY
Status: DISCONTINUED | OUTPATIENT
Start: 2023-12-02 | End: 2023-12-02

## 2023-12-02 RX ORDER — NITROFURANTOIN 25; 75 MG/1; MG/1
100 CAPSULE ORAL EVERY 12 HOURS
Status: COMPLETED | OUTPATIENT
Start: 2023-12-02 | End: 2023-12-05

## 2023-12-02 RX ORDER — FUROSEMIDE 40 MG/1
40 TABLET ORAL 2 TIMES DAILY
Status: COMPLETED | OUTPATIENT
Start: 2023-12-02 | End: 2023-12-04

## 2023-12-02 RX ADMIN — PANTOPRAZOLE SODIUM 40 MG: 40 TABLET, DELAYED RELEASE ORAL at 08:12

## 2023-12-02 RX ADMIN — NITROFURANTOIN MONOHYDRATE/MACROCRYSTALS 100 MG: 25; 75 CAPSULE ORAL at 08:12

## 2023-12-02 RX ADMIN — FUROSEMIDE 40 MG: 40 TABLET ORAL at 04:12

## 2023-12-02 NOTE — SUBJECTIVE & OBJECTIVE
Interval History: see above      Objective:     Vital Signs (Most Recent):  Temp: 98 °F (36.7 °C) (12/02/23 1655)  Pulse: 93 (12/02/23 1655)  Resp: 16 (12/02/23 1655)  BP: (!) 104/51 (12/02/23 1655)  SpO2: 97 % (12/02/23 1655) Vital Signs (24h Range):  Temp:  [97.9 °F (36.6 °C)-98.7 °F (37.1 °C)] 98 °F (36.7 °C)  Pulse:  [79-93] 93  Resp:  [16-19] 16  SpO2:  [96 %-99 %] 97 %  BP: ()/(51-58) 104/51     Weight: 79.4 kg (175 lb 0.7 oz)  Body mass index is 29.13 kg/m².    Intake/Output Summary (Last 24 hours) at 12/2/2023 1705  Last data filed at 12/2/2023 1703  Gross per 24 hour   Intake 240 ml   Output 350 ml   Net -110 ml         Physical Exam  Vitals and nursing note reviewed.   Constitutional:       General: She is not in acute distress.     Appearance: She is well-developed. She is obese.   HENT:      Head: Normocephalic and atraumatic.      Nose: Nose normal.      Mouth/Throat:      Mouth: Mucous membranes are moist.      Pharynx: Oropharynx is clear.   Eyes:      Extraocular Movements: Extraocular movements intact.      Pupils: Pupils are equal, round, and reactive to light.   Cardiovascular:      Rate and Rhythm: Normal rate and regular rhythm.   Pulmonary:      Effort: Pulmonary effort is normal.      Breath sounds: Normal breath sounds.   Abdominal:      General: Abdomen is flat. Bowel sounds are normal. There is no distension.      Palpations: Abdomen is soft.      Tenderness: There is no abdominal tenderness.   Genitourinary:     Comments: Maroon colored stool reported by nursing  Musculoskeletal:         General: No tenderness.   Skin:     General: Skin is warm and dry.   Neurological:      Mental Status: She is alert.      Comments: Oriented to person, place, and year. Reports she doesn't keep up with the date anymore, unable to provide daughter's numbers.    Psychiatric:         Behavior: Behavior normal.             Significant Labs: All pertinent labs within the past 24 hours have been  reviewed.    Significant Imaging: I have reviewed all pertinent imaging results/findings within the past 24 hours.

## 2023-12-02 NOTE — PLAN OF CARE
Plan of Care Note  Dx symptomatic anemia     Shift Events no change    Goals of Care: safety , monitor HGB    Neuro: Aox2-3, forgetful    Vital Signs: VSS    Respiratory: room air    Diet: regular    Is patient tolerating current diet? yes    GTTS: none at present    Urine Output/Bowel Movement: WNL    Drains/Tubes/Tube Feeds (include total output/shift): n/a    Lines: midline left upper arm      Accuchecks:no      Fall Risk Score: see flowsheet, precautions maintained    Activity level? In bed    Any scheduled procedures? No, possible egd pending family contact and consent    Any safety concerns? Fall risk, low hgb, bleeding risk , dvt prevention    Other: n/a    Problem: Adult Inpatient Plan of Care  Goal: Plan of Care Review  Outcome: Ongoing, Progressing  Goal: Patient-Specific Goal (Individualized)  Outcome: Ongoing, Progressing  Goal: Absence of Hospital-Acquired Illness or Injury  Outcome: Ongoing, Progressing  Goal: Optimal Comfort and Wellbeing  Outcome: Ongoing, Progressing  Goal: Readiness for Transition of Care  Outcome: Ongoing, Progressing     Problem: Infection  Goal: Absence of Infection Signs and Symptoms  Outcome: Ongoing, Progressing     Problem: Impaired Wound Healing  Goal: Optimal Wound Healing  Outcome: Ongoing, Progressing     Problem: Skin Injury Risk Increased  Goal: Skin Health and Integrity  Outcome: Ongoing, Progressing

## 2023-12-02 NOTE — PROGRESS NOTES
"[facility] - [facility] ([address] Medicine  Progress Note    Patient Name: Ana Vasquez  MRN: [id number]  Patient Class: IP- Inpatient   Admission Date: 11/27/2023  Length of Stay: 5 days  Attending Physician: Madison Tubbs DO  Primary Care Provider: Jennifer Schwartz MD        Subjective:     Principal Problem:Symptomatic anemia        HPI:  Ana Vasquez is a 83 y.o. female with a hx of DVT and HTN presents to the ED from her NH for shortness of breath with exertion. Patient is a poor historian. Needing frequent redirection during the interview. Patient unable to tell me why she is here. When asked about her needing blood, she states "she always has low blood level." Per ED provider, " She reports someone in her nursing home told her that her blood levels were low but she always has "low blood levels". She denies change in recent medications and thinks she continues to take Eliquis.  She denies noting recent black or tarry stools, gnawing epigastric pain, nausea, vomiting, chest pain arrest, swelling of the extremities, fever, cough.  She denies knowledge of past gastrointestinal bleeding episodes.  She thinks she takes a multivitamin but is unsure if it may contain iron." Patient denies any rectal bleeding, hematuria and hemoptysis on my exam. Denies any further complaints. Endorses improvement of shortness of breath.     ED: AF, tachycardic and hypotensive on admission. Hgb 4.3. K 3.3. . CXR showed no acute process. 2 units pRBCs ordered in ED and transfused. GI consulted. Given IV protonix in ED.     Overview/Hospital Course:  Patient admitted for anemia 2/2 possible GIB as patient on eliquis at home. AC held. Transfused 3 units,  trending H/H. GI consulted. No melena or bleeding noted. UA infectious and started on CTX, Ucx GNR awaiting speciation. Unable to contact family, contact numbers on chart are out of service, contacted NH who have the same numbers on file. Attempting " to contact family for consent for scope as patient with underlying cognitive decline and unable to consent herself per GI. If unable to contact family, plan for scope outpt. H/H stable and no further bleeding. Risk benefit for restarting eliquis, for prior DVT in 2021. CARLITOS u/s ordered to eval DVT.     Discussed the case with case management this morning.  Patient is on Eliquis at home, and currently the patient is high-risk for bleeding as she presented with a hemoglobin of only 4.  Therefore we require ethics consult as were not able to obtain a consent for an endoscopy vs.  Stopping her Eliquis.  At this point, the patient is high risk of bleeding and can not be started on anticoagulation.  She is pleasant, denies any complaints.  There is worsening of her lower extremity edema.  Plan to start her on furosemide and obtain an echocardiogram.       Interval History: see above      Objective:     Vital Signs (Most Recent):  Temp: 98 °F (36.7 °C) (12/02/23 1655)  Pulse: 93 (12/02/23 1655)  Resp: 16 (12/02/23 1655)  BP: (!) 104/51 (12/02/23 1655)  SpO2: 97 % (12/02/23 1655) Vital Signs (24h Range):  Temp:  [97.9 °F (36.6 °C)-98.7 °F (37.1 °C)] 98 °F (36.7 °C)  Pulse:  [79-93] 93  Resp:  [16-19] 16  SpO2:  [96 %-99 %] 97 %  BP: ()/(51-58) 104/51     Weight: 79.4 kg (175 lb 0.7 oz)  Body mass index is 29.13 kg/m².    Intake/Output Summary (Last 24 hours) at 12/2/2023 1705  Last data filed at 12/2/2023 1703  Gross per 24 hour   Intake 240 ml   Output 350 ml   Net -110 ml         Physical Exam  Vitals and nursing note reviewed.   Constitutional:       General: She is not in acute distress.     Appearance: She is well-developed. She is obese.   HENT:      Head: Normocephalic and atraumatic.      Nose: Nose normal.      Mouth/Throat:      Mouth: Mucous membranes are moist.      Pharynx: Oropharynx is clear.   Eyes:      Extraocular Movements: Extraocular movements intact.      Pupils: Pupils are equal, round, and  reactive to light.   Cardiovascular:      Rate and Rhythm: Normal rate and regular rhythm.   Pulmonary:      Effort: Pulmonary effort is normal.      Breath sounds: Normal breath sounds.   Abdominal:      General: Abdomen is flat. Bowel sounds are normal. There is no distension.      Palpations: Abdomen is soft.      Tenderness: There is no abdominal tenderness.   Genitourinary:     Comments: Maroon colored stool reported by nursing  Musculoskeletal:         General: No tenderness.   Skin:     General: Skin is warm and dry.   Neurological:      Mental Status: She is alert.      Comments: Oriented to person, place, and year. Reports she doesn't keep up with the date anymore, unable to provide daughter's numbers.    Psychiatric:         Behavior: Behavior normal.             Significant Labs: All pertinent labs within the past 24 hours have been reviewed.    Significant Imaging: I have reviewed all pertinent imaging results/findings within the past 24 hours.    Assessment/Plan:      * Symptomatic anemia  Acute blood loss anemia  Patient's anemia is currently controlled. Has received 3 units of PRBCs on 11/27 . Etiology likely d/t acute blood loss which was from concern for GIB  Current CBC reviewed-   Lab Results   Component Value Date    HGB 8.5 (L) 11/29/2023    HCT 27.4 (L) 11/29/2023     Monitor serial CBC and transfuse if patient becomes hemodynamically unstable, symptomatic or H/H drops below 7/21.  - last known Hgb was ~11 in 2021, pt has Eliquis on med list but she cannot say if she is taking it  - guaiac positive  - Hgb 4.6>> receiving 2 units in ED, another unit on floor  - anemia panel neg  - CBC q8  - transfuse Hgb <7  - GI consulted, appreciate assistance   - low threshold for ICU care if becomes hemodynamically unstable    - GI recommend scope but given patient unable to consent herself and unable to contact family elected for outpt scope   - cont PPI   - will cont to try and get family contact,  emergency contact numbers on file are nonworking. Unfortunately no family has come by and NH has the same nonworking numbers on file. However patient reports she keeps numbers in her purse at the NH so will re attempt to contact NH to get family contact     Lower extremity edema  -- start with furosemide 40 mg b.i.d. p.o..  -- strict I&O  -- echocardiogram, as none on file.      Acute cystitis  UA infectious, foul smelling  - Ucx GNR  - CTX pending cultures  -- sensitive to Macrobid, patient refused an IV medicine at this point.  It is okay to switch to Macrobid    Hypokalemia  Patient has hypokalemia which is Acute and currently controlled. Most recent potassium levels reviewed-   Lab Results   Component Value Date    K 3.0 (L) 11/29/2023   . Will continue potassium replacement per protocol and recheck repeat levels after replacement completed.     Lower GI bleed  - FOBT positive   - Hgb 4 on admit, baseline around ~11 2 years ago  - continue IV protonix infusion  - GI consulted, appreciate assistance  - Type and screen, blood consent obtained  - CBCq 8h.  Transfuse for Hemoglobin <7  - Coagulopathy goals:  Plt >50, INR <1.5  - GI recommend scope, but unable to contact family for consent, given this recommend outpt scope at later time   - H/H stable post transfusion and no blood in BM noted by nursing, however H/H slowly decreasing   - cont PO PPI  - refer to GI outpt for scope in near future once family able to be contacted    Acute blood loss anemia  Patient's anemia is currently controlled. Has received 3 units of PRBCs on 11/27 . Etiology likely d/t acute blood loss concern for GIB  Current CBC reviewed-   Lab Results   Component Value Date    HGB 8.3 (L) 11/30/2023    HCT 27.4 (L) 11/30/2023     Monitor serial CBC and transfuse if patient becomes hemodynamically unstable, symptomatic or H/H drops below 7/21.    Trending down slowly. Maroon colored stool. Reaching out to GI     Chronic deep vein thrombosis  (DVT) of proximal vein of right lower extremity  - hx noted  - holding Eliquis in setting of acute blood loss  - will restart when clinically indicated  - will place sequential compression devices     - LE u/s ordered to reeval clot to assist in risk benefit of restarting eliquis      Essential hypertension  - hx noted, hypotensive on admission  - monitor volume status/ hemodynamics closely with acute blood loss  - BP improved, monitor and adjust regimen as needed      VTE Risk Mitigation (From admission, onward)           Ordered     Place sequential compression device  Until discontinued         11/27/23 1608     IP VTE HIGH RISK PATIENT  Once         11/27/23 1412     Reason for No Pharmacological VTE Prophylaxis  Once        Question:  Reasons:  Answer:  Active Bleeding    11/27/23 1412                    Discharge Planning   BUBBA: 12/5/2023     Code Status: Full Code   Is the patient medically ready for discharge?: No    Reason for patient still in hospital (select all that apply): Patient new problem, Patient trending condition, Treatment, and Consult recommendations  [facility] Plan A[facility] Phone: [phone])   Discharge Delays: None known at this time              Madison Tubbs DO  Department of Hospital Medicine   [facility] - Intensive Care ([place]-14)

## 2023-12-02 NOTE — ASSESSMENT & PLAN NOTE
UA infectious, foul smelling  - Ucx GNR  - CTX pending cultures  -- sensitive to Macrobid, patient refused an IV medicine at this point.  It is okay to switch to Macrobid   98

## 2023-12-03 LAB
ANISOCYTOSIS BLD QL SMEAR: ABNORMAL
BASO STIPL BLD QL SMEAR: ABNORMAL
BASOPHILS # BLD AUTO: 0.06 K/UL (ref 0–0.2)
BASOPHILS NFR BLD: 0.8 % (ref 0–1.9)
BURR CELLS BLD QL SMEAR: ABNORMAL
DIFFERENTIAL METHOD: ABNORMAL
EOSINOPHIL # BLD AUTO: 0.3 K/UL (ref 0–0.5)
EOSINOPHIL NFR BLD: 4.2 % (ref 0–8)
ERYTHROCYTE [DISTWIDTH] IN BLOOD BY AUTOMATED COUNT: ABNORMAL % (ref 11.5–14.5)
HCT VFR BLD AUTO: 29 % (ref 37–48.5)
HGB BLD-MCNC: 8.5 G/DL (ref 12–16)
HYPOCHROMIA BLD QL SMEAR: ABNORMAL
IMM GRANULOCYTES # BLD AUTO: 0.01 K/UL (ref 0–0.04)
IMM GRANULOCYTES NFR BLD AUTO: 0.1 % (ref 0–0.5)
LYMPHOCYTES # BLD AUTO: 1.6 K/UL (ref 1–4.8)
LYMPHOCYTES NFR BLD: 22.2 % (ref 18–48)
MCH RBC QN AUTO: 23.8 PG (ref 27–31)
MCHC RBC AUTO-ENTMCNC: 29.3 G/DL (ref 32–36)
MCV RBC AUTO: 81 FL (ref 82–98)
MONOCYTES # BLD AUTO: 0.7 K/UL (ref 0.3–1)
MONOCYTES NFR BLD: 9.7 % (ref 4–15)
NEUTROPHILS # BLD AUTO: 4.5 K/UL (ref 1.8–7.7)
NEUTROPHILS NFR BLD: 63 % (ref 38–73)
NRBC BLD-RTO: 0 /100 WBC
OVALOCYTES BLD QL SMEAR: ABNORMAL
PLATELET # BLD AUTO: 156 K/UL (ref 150–450)
PLATELET BLD QL SMEAR: ABNORMAL
PMV BLD AUTO: 9.4 FL (ref 9.2–12.9)
POIKILOCYTOSIS BLD QL SMEAR: SLIGHT
POLYCHROMASIA BLD QL SMEAR: ABNORMAL
RBC # BLD AUTO: 3.57 M/UL (ref 4–5.4)
SPHEROCYTES BLD QL SMEAR: ABNORMAL
TARGETS BLD QL SMEAR: ABNORMAL
WBC # BLD AUTO: 7.12 K/UL (ref 3.9–12.7)

## 2023-12-03 PROCEDURE — 85025 COMPLETE CBC W/AUTO DIFF WBC: CPT | Performed by: STUDENT IN AN ORGANIZED HEALTH CARE EDUCATION/TRAINING PROGRAM

## 2023-12-03 PROCEDURE — S4991 NICOTINE PATCH NONLEGEND: HCPCS

## 2023-12-03 PROCEDURE — 25000003 PHARM REV CODE 250

## 2023-12-03 PROCEDURE — 25000003 PHARM REV CODE 250: Performed by: STUDENT IN AN ORGANIZED HEALTH CARE EDUCATION/TRAINING PROGRAM

## 2023-12-03 PROCEDURE — 63600175 PHARM REV CODE 636 W HCPCS: Performed by: STUDENT IN AN ORGANIZED HEALTH CARE EDUCATION/TRAINING PROGRAM

## 2023-12-03 PROCEDURE — 36415 COLL VENOUS BLD VENIPUNCTURE: CPT | Performed by: STUDENT IN AN ORGANIZED HEALTH CARE EDUCATION/TRAINING PROGRAM

## 2023-12-03 PROCEDURE — 94761 N-INVAS EAR/PLS OXIMETRY MLT: CPT

## 2023-12-03 PROCEDURE — 20600001 HC STEP DOWN PRIVATE ROOM

## 2023-12-03 RX ORDER — HEPARIN SODIUM 5000 [USP'U]/ML
5000 INJECTION, SOLUTION INTRAVENOUS; SUBCUTANEOUS EVERY 8 HOURS
Status: DISCONTINUED | OUTPATIENT
Start: 2023-12-03 | End: 2023-12-04

## 2023-12-03 RX ADMIN — HEPARIN SODIUM 5000 UNITS: 5000 INJECTION INTRAVENOUS; SUBCUTANEOUS at 09:12

## 2023-12-03 RX ADMIN — PANTOPRAZOLE SODIUM 40 MG: 40 TABLET, DELAYED RELEASE ORAL at 08:12

## 2023-12-03 RX ADMIN — FUROSEMIDE 40 MG: 40 TABLET ORAL at 08:12

## 2023-12-03 RX ADMIN — HEPARIN SODIUM 5000 UNITS: 5000 INJECTION INTRAVENOUS; SUBCUTANEOUS at 01:12

## 2023-12-03 RX ADMIN — NICOTINE 1 PATCH: 14 PATCH, EXTENDED RELEASE TRANSDERMAL at 08:12

## 2023-12-03 RX ADMIN — FUROSEMIDE 40 MG: 40 TABLET ORAL at 06:12

## 2023-12-03 RX ADMIN — ACETAMINOPHEN 650 MG: 325 TABLET ORAL at 06:12

## 2023-12-03 RX ADMIN — NITROFURANTOIN MONOHYDRATE/MACROCRYSTALS 100 MG: 25; 75 CAPSULE ORAL at 08:12

## 2023-12-03 NOTE — PROGRESS NOTES
"[facility] ([address] Medicine  Progress Note    Patient Name: Ana Vasquez  MRN: [id number]  Patient Class: IP- Inpatient   Admission Date: 11/27/2023  Length of Stay: 6 days  Attending Physician: Madison Tubbs DO  Primary Care Provider: Jennifer Schwartz MD        Subjective:     Principal Problem:Symptomatic anemia        HPI:  Ana Vasquez is a 83 y.o. female with a hx of DVT and HTN presents to the ED from her NH for shortness of breath with exertion. Patient is a poor historian. Needing frequent redirection during the interview. Patient unable to tell me why she is here. When asked about her needing blood, she states "she always has low blood level." Per ED provider, " She reports someone in her nursing home told her that her blood levels were low but she always has "low blood levels". She denies change in recent medications and thinks she continues to take Eliquis.  She denies noting recent black or tarry stools, gnawing epigastric pain, nausea, vomiting, chest pain arrest, swelling of the extremities, fever, cough.  She denies knowledge of past gastrointestinal bleeding episodes.  She thinks she takes a multivitamin but is unsure if it may contain iron." Patient denies any rectal bleeding, hematuria and hemoptysis on my exam. Denies any further complaints. Endorses improvement of shortness of breath.     ED: AF, tachycardic and hypotensive on admission. Hgb 4.3. K 3.3. . CXR showed no acute process. 2 units pRBCs ordered in ED and transfused. GI consulted. Given IV protonix in ED.     Overview/Hospital Course:  Patient admitted for anemia 2/2 possible GIB as patient on eliquis at home. AC held. Transfused 3 units,  trending H/H. GI consulted. No melena or bleeding noted. UA infectious and started on CTX, Ucx GNR awaiting speciation. Unable to contact family, contact numbers on chart are out of service, contacted NH who have the same numbers on file. Attempting " "to contact family for consent for scope as patient with underlying cognitive decline and unable to consent herself per GI. If unable to contact family, plan for scope outpt. H/H stable and no further bleeding. Risk benefit for restarting eliquis, for prior DVT in 2021. CARLITOS u/s ordered to eval DVT.     Discussed the case with case management this morning.  Patient is on Eliquis at home, and currently the patient is high-risk for bleeding as she presented with a hemoglobin of only 4.  Therefore we require ethics consult as were not able to obtain a consent for an endoscopy vs.  Stopping her Eliquis. There is worsening of her lower extremity edema.  Plan to start her on furosemide and obtain an echocardiogram.     Had a lengthy discussion this morning with the patient.  Patient reports she has had heavy periods when she was younger.  She feels that she has a tendency to easily bleed at times.  Discussed with patient that she is taking a blood thinner to stop her from forming a clot and it is risky to stop it as she may form another clot.  She is also risky to give it to her as she may bleed.  Patient points at her hands and reported she has had multiple sticks and she is tired of them.  Reports that she had significant pain during childbirth "however I ended up with the baby in my hand which was very rewarding, I am going through this much pain and I do not feel there is a reward at the end.  That has been said open to whatever you suggest at this point.  Would prefer a pill anticoagulant." Discussed with the patient that heparin which is the IV form can be reversed in the case of an emergency and would like to start with that if she is agreeable.  Patient thought about it for a few seconds and then looked at me and then said "I am okay with IV form of anticoagulant".  I asked the patient if she understands the risks of it.. "Yeah yeah..  Causes bleeding." She has awareness of the situation. Most of my conversation with " the patient have been logical. However, I understand the GI fellow's concerns given her mental status therefore we are proceeding with caution regarding anticoagulation and consulting with ethics regarding discharge home with Eliquis vs. endoscopy. Contacted the NH and spoke with her nurse there. I appreciate their assistance. She said that they are planning to reach out to the director and/or  on site to assess the family situation.     Interval History: see above      Objective:     Vital Signs (Most Recent):  Temp: 97.6 °F (36.4 °C) (12/03/23 0728)  Pulse: 86 (12/03/23 0728)  Resp: 16 (12/03/23 0728)  BP: (!) 112/55 (12/03/23 0728)  SpO2: 97 % (12/03/23 0728) Vital Signs (24h Range):  Temp:  [97.4 °F (36.3 °C)-98.6 °F (37 °C)] 97.6 °F (36.4 °C)  Pulse:  [86-99] 86  Resp:  [16-18] 16  SpO2:  [96 %-97 %] 97 %  BP: ()/(50-55) 112/55     Weight: 79.4 kg (175 lb 0.7 oz)  Body mass index is 29.13 kg/m².    Intake/Output Summary (Last 24 hours) at 12/3/2023 1135  Last data filed at 12/3/2023 0900  Gross per 24 hour   Intake --   Output 1250 ml   Net -1250 ml         Physical Exam  Vitals and nursing note reviewed.   Constitutional:       General: She is not in acute distress.     Appearance: She is well-developed. She is obese.   HENT:      Head: Normocephalic and atraumatic.      Nose: Nose normal.      Mouth/Throat:      Mouth: Mucous membranes are moist.      Pharynx: Oropharynx is clear.   Eyes:      Extraocular Movements: Extraocular movements intact.      Pupils: Pupils are equal, round, and reactive to light.   Cardiovascular:      Rate and Rhythm: Normal rate and regular rhythm.   Pulmonary:      Effort: Pulmonary effort is normal.      Breath sounds: Normal breath sounds.   Abdominal:      General: Abdomen is flat. Bowel sounds are normal. There is no distension.      Palpations: Abdomen is soft.      Tenderness: There is no abdominal tenderness.   Genitourinary:     Comments: Maroon colored  stool reported by nursing  Musculoskeletal:         General: No tenderness.   Skin:     General: Skin is warm and dry.   Neurological:      Mental Status: She is alert.      Comments: Oriented to person, place, and year. Reports she doesn't keep up with the date anymore, unable to provide daughter's numbers.    Psychiatric:         Behavior: Behavior normal.             Significant Labs: All pertinent labs within the past 24 hours have been reviewed.    Significant Imaging: I have reviewed all pertinent imaging results/findings within the past 24 hours.    Assessment/Plan:      * Symptomatic anemia  Acute blood loss anemia  Patient's anemia is currently controlled. Has received 3 units of PRBCs on 11/27 . Etiology likely d/t acute blood loss which was from concern for GIB  Current CBC reviewed-   Lab Results   Component Value Date    HGB 8.5 (L) 11/29/2023    HCT 27.4 (L) 11/29/2023     Monitor serial CBC and transfuse if patient becomes hemodynamically unstable, symptomatic or H/H drops below 7/21.  - last known Hgb was ~11 in 2021, pt has Eliquis on med list but she cannot say if she is taking it  - guaiac positive  - Hgb 4.6>> receiving 2 units in ED, another unit on floor  - anemia panel neg  - CBC q8  - transfuse Hgb <7  - GI consulted, appreciate assistance   - low threshold for ICU care if becomes hemodynamically unstable    - GI recommend scope but given patient unable to consent herself and unable to contact family elected for outpt scope   - cont PPI   - will cont to try and get family contact, emergency contact numbers on file are nonworking. Unfortunately no family has come by and NH has the same nonworking numbers on file. However patient reports she keeps numbers in her purse at the NH so will re attempt to contact NH to get family contact     Lower extremity edema  -- start with furosemide 40 mg b.i.d. p.o..  -- strict I&O  -- echocardiogram, as none on file.  -- US DVT LE for history of iliac vein  stenosis s/p stenting which then thrmobiss again. Been holding off AC, GI recommendations not to do endoscopy as the bleeding was stabilized with blood transfusion. Therefore, it was deemed by GI as non emergent and outpatient endoscopy was recommended. I felt uncomfrtable with the dc, especially restarting eliquis. Reached out to GI again and case management. Another attempt to get a hold family was still unsuccessful. It was determined that it is best to consult ethics on this case.       Acute cystitis  UA infectious, foul smelling  - Ucx GNR  - CTX pending cultures  -- sensitive to Macrobid, patient refused an IV medicine at this point.  It is okay to switch to Macrobid    Hypokalemia  Patient has hypokalemia which is Acute and currently controlled. Most recent potassium levels reviewed-   Lab Results   Component Value Date    K 3.0 (L) 11/29/2023   . Will continue potassium replacement per protocol and recheck repeat levels after replacement completed.     Lower GI bleed  - FOBT positive   - Hgb 4 on admit, baseline around ~11 2 years ago  - continue IV protonix infusion  - GI consulted, appreciate assistance  - Type and screen, blood consent obtained  - CBCq 8h.  Transfuse for Hemoglobin <7  - Coagulopathy goals:  Plt >50, INR <1.5  - GI recommend scope, but unable to contact family for consent, given this recommend outpt scope at later time   - H/H stable post transfusion and no blood in BM noted by nursing, however H/H slowly decreasing   - cont PO PPI  - refer to GI outpt for scope in near future once family able to be contacted    Acute blood loss anemia  Patient's anemia is currently controlled. Has received 3 units of PRBCs on 11/27 . Etiology likely d/t acute blood loss concern for GIB  Current CBC reviewed-   Lab Results   Component Value Date    HGB 8.3 (L) 11/30/2023    HCT 27.4 (L) 11/30/2023     Monitor serial CBC and transfuse if patient becomes hemodynamically unstable, symptomatic or H/H drops  below 7/21.    Trending down slowly. Maroon colored stool. Reaching out to GI     Chronic deep vein thrombosis (DVT) of proximal vein of right lower extremity  - hx noted  - holding Eliquis in setting of acute blood loss  - will restart when clinically indicated  - will place sequential compression devices     - LE u/s ordered to reeval clot to assist in risk benefit of restarting eliquis      Essential hypertension  - hx noted, hypotensive on admission  - monitor volume status/ hemodynamics closely with acute blood loss  - BP improved, monitor and adjust regimen as needed      VTE Risk Mitigation (From admission, onward)           Ordered     heparin (porcine) injection 5,000 Units  Every 8 hours         12/03/23 1121     Place sequential compression device  Until discontinued         11/27/23 1608     IP VTE HIGH RISK PATIENT  Once         11/27/23 1412     Reason for No Pharmacological VTE Prophylaxis  Once        Question:  Reasons:  Answer:  Active Bleeding    11/27/23 1412                    Discharge Planning   BUBBA: 12/5/2023     Code Status: Full Code   Is the patient medically ready for discharge?: No    Reason for patient still in hospital (select all that apply): Patient new problem, Patient trending condition, Treatment, and Consult recommendations  Discharge Plan A: [facility] Phone: [phone])   Discharge Delays: None known at this time              Madison Tubbs DO  Department of Hospital Medicine   [facility] ([place])

## 2023-12-03 NOTE — ASSESSMENT & PLAN NOTE
-- start with furosemide 40 mg b.i.d. p.o..  -- strict I&O  -- echocardiogram, as none on file.  -- US DVT LE for history of iliac vein stenosis s/p stenting which then thrmobiss again. Been holding off AC, GI recommendations not to do endoscopy as the bleeding was stabilized with blood transfusion. Therefore, it was deemed by GI as non emergent and outpatient endoscopy was recommended. I felt uncomfrtable with the dc, especially restarting eliquis. Reached out to GI again and case management. Another attempt to get a hold family was still unsuccessful. It was determined that it is best to consult ethics on this case.

## 2023-12-03 NOTE — SUBJECTIVE & OBJECTIVE
Interval History: see above      Objective:     Vital Signs (Most Recent):  Temp: 97.6 °F (36.4 °C) (12/03/23 0728)  Pulse: 86 (12/03/23 0728)  Resp: 16 (12/03/23 0728)  BP: (!) 112/55 (12/03/23 0728)  SpO2: 97 % (12/03/23 0728) Vital Signs (24h Range):  Temp:  [97.4 °F (36.3 °C)-98.6 °F (37 °C)] 97.6 °F (36.4 °C)  Pulse:  [86-99] 86  Resp:  [16-18] 16  SpO2:  [96 %-97 %] 97 %  BP: ()/(50-55) 112/55     Weight: 79.4 kg (175 lb 0.7 oz)  Body mass index is 29.13 kg/m².    Intake/Output Summary (Last 24 hours) at 12/3/2023 1135  Last data filed at 12/3/2023 0900  Gross per 24 hour   Intake --   Output 1250 ml   Net -1250 ml         Physical Exam  Vitals and nursing note reviewed.   Constitutional:       General: She is not in acute distress.     Appearance: She is well-developed. She is obese.   HENT:      Head: Normocephalic and atraumatic.      Nose: Nose normal.      Mouth/Throat:      Mouth: Mucous membranes are moist.      Pharynx: Oropharynx is clear.   Eyes:      Extraocular Movements: Extraocular movements intact.      Pupils: Pupils are equal, round, and reactive to light.   Cardiovascular:      Rate and Rhythm: Normal rate and regular rhythm.   Pulmonary:      Effort: Pulmonary effort is normal.      Breath sounds: Normal breath sounds.   Abdominal:      General: Abdomen is flat. Bowel sounds are normal. There is no distension.      Palpations: Abdomen is soft.      Tenderness: There is no abdominal tenderness.   Genitourinary:     Comments: Maroon colored stool reported by nursing  Musculoskeletal:         General: No tenderness.   Skin:     General: Skin is warm and dry.   Neurological:      Mental Status: She is alert.      Comments: Oriented to person, place, and year. Reports she doesn't keep up with the date anymore, unable to provide daughter's numbers.    Psychiatric:         Behavior: Behavior normal.             Significant Labs: All pertinent labs within the past 24 hours have been  reviewed.    Significant Imaging: I have reviewed all pertinent imaging results/findings within the past 24 hours.

## 2023-12-04 PROBLEM — I82.411 ACUTE DEEP VEIN THROMBOSIS (DVT) OF FEMORAL VEIN OF RIGHT LOWER EXTREMITY: Status: ACTIVE | Noted: 2023-12-04

## 2023-12-04 LAB
ALBUMIN SERPL BCP-MCNC: 1.5 G/DL (ref 3.5–5.2)
ALP SERPL-CCNC: 84 U/L (ref 55–135)
ALT SERPL W/O P-5'-P-CCNC: 11 U/L (ref 10–44)
ANION GAP SERPL CALC-SCNC: 6 MMOL/L (ref 8–16)
ASCENDING AORTA: 2.9 CM
AST SERPL-CCNC: 20 U/L (ref 10–40)
AV INDEX (PROSTH): 1.05
AV MEAN GRADIENT: 4 MMHG
AV PEAK GRADIENT: 7 MMHG
AV VALVE AREA BY VELOCITY RATIO: 3.18 CM²
AV VALVE AREA: 3.18 CM²
AV VELOCITY RATIO: 1.05
BILIRUB SERPL-MCNC: 0.4 MG/DL (ref 0.1–1)
BSA FOR ECHO PROCEDURE: 1.91 M2
BUN SERPL-MCNC: 19 MG/DL (ref 8–23)
CALCIUM SERPL-MCNC: 6.9 MG/DL (ref 8.7–10.5)
CHLORIDE SERPL-SCNC: 103 MMOL/L (ref 95–110)
CO2 SERPL-SCNC: 27 MMOL/L (ref 23–29)
CREAT SERPL-MCNC: 0.9 MG/DL (ref 0.5–1.4)
CV ECHO LV RWT: 0.5 CM
DOP CALC AO PEAK VEL: 1.32 M/S
DOP CALC AO VTI: 26.47 CM
DOP CALC LVOT AREA: 3 CM2
DOP CALC LVOT DIAMETER: 1.96 CM
DOP CALC LVOT PEAK VEL: 1.39 M/S
DOP CALC LVOT STROKE VOLUME: 84.14 CM3
DOP CALC MV VTI: 47.46 CM
DOP CALCLVOT PEAK VEL VTI: 27.9 CM
E WAVE DECELERATION TIME: 162.64 MSEC
E/A RATIO: 0.79
E/E' RATIO: 17.75 M/S
ECHO LV POSTERIOR WALL: 1.14 CM (ref 0.6–1.1)
EJECTION FRACTION: 63 %
EST. GFR  (NO RACE VARIABLE): >60 ML/MIN/1.73 M^2
FRACTIONAL SHORTENING: 28 % (ref 28–44)
GLUCOSE SERPL-MCNC: 83 MG/DL (ref 70–110)
INR PPP: 1.2 (ref 0.8–1.2)
INTERVENTRICULAR SEPTUM: 0.95 CM (ref 0.6–1.1)
IVRT: 85.63 MSEC
LA MAJOR: 6.05 CM
LA MINOR: 5.78 CM
LA WIDTH: 5.08 CM
LEFT ATRIUM SIZE: 4.42 CM
LEFT ATRIUM VOLUME INDEX MOD: 47.8 ML/M2
LEFT ATRIUM VOLUME INDEX: 60.3 ML/M2
LEFT ATRIUM VOLUME MOD: 89.32 CM3
LEFT ATRIUM VOLUME: 112.83 CM3
LEFT INTERNAL DIMENSION IN SYSTOLE: 3.27 CM (ref 2.1–4)
LEFT VENTRICLE DIASTOLIC VOLUME INDEX: 50.93 ML/M2
LEFT VENTRICLE DIASTOLIC VOLUME: 95.24 ML
LEFT VENTRICLE MASS INDEX: 89 G/M2
LEFT VENTRICLE SYSTOLIC VOLUME INDEX: 23 ML/M2
LEFT VENTRICLE SYSTOLIC VOLUME: 43.02 ML
LEFT VENTRICULAR INTERNAL DIMENSION IN DIASTOLE: 4.56 CM (ref 3.5–6)
LEFT VENTRICULAR MASS: 166.38 G
LV LATERAL E/E' RATIO: 17.75 M/S
LV SEPTAL E/E' RATIO: 17.75 M/S
MV MEAN GRADIENT: 8 MMHG
MV PEAK A VEL: 1.8 M/S
MV PEAK E VEL: 1.42 M/S
MV PEAK GRADIENT: 15 MMHG
MV STENOSIS PRESSURE HALF TIME: 47.16 MS
MV VALVE AREA BY CONTINUITY EQUATION: 1.77 CM2
MV VALVE AREA P 1/2 METHOD: 4.66 CM2
PISA TR MAX VEL: 2.97 M/S
POTASSIUM SERPL-SCNC: 3 MMOL/L (ref 3.5–5.1)
PROT SERPL-MCNC: 5.1 G/DL (ref 6–8.4)
PROTHROMBIN TIME: 13 SEC (ref 9–12.5)
RA MAJOR: 5.2 CM
RA WIDTH: 3.34 CM
RIGHT VENTRICULAR END-DIASTOLIC DIMENSION: 3.09 CM
SINUS: 3.07 CM
SODIUM SERPL-SCNC: 136 MMOL/L (ref 136–145)
STJ: 2.39 CM
TDI LATERAL: 0.08 M/S
TDI SEPTAL: 0.08 M/S
TDI: 0.08 M/S
TR MAX PG: 35 MMHG
TRICUSPID ANNULAR PLANE SYSTOLIC EXCURSION: 1.81 CM
Z-SCORE OF LEFT VENTRICULAR DIMENSION IN END DIASTOLE: -1.34
Z-SCORE OF LEFT VENTRICULAR DIMENSION IN END SYSTOLE: 0.13

## 2023-12-04 PROCEDURE — 63600175 PHARM REV CODE 636 W HCPCS: Performed by: STUDENT IN AN ORGANIZED HEALTH CARE EDUCATION/TRAINING PROGRAM

## 2023-12-04 PROCEDURE — 20600001 HC STEP DOWN PRIVATE ROOM

## 2023-12-04 PROCEDURE — 25000003 PHARM REV CODE 250

## 2023-12-04 PROCEDURE — 85610 PROTHROMBIN TIME: CPT | Performed by: STUDENT IN AN ORGANIZED HEALTH CARE EDUCATION/TRAINING PROGRAM

## 2023-12-04 PROCEDURE — 99232 PR SUBSEQUENT HOSPITAL CARE,LEVL II: ICD-10-PCS | Mod: ,,,

## 2023-12-04 PROCEDURE — 25000003 PHARM REV CODE 250: Performed by: STUDENT IN AN ORGANIZED HEALTH CARE EDUCATION/TRAINING PROGRAM

## 2023-12-04 PROCEDURE — 80053 COMPREHEN METABOLIC PANEL: CPT | Performed by: STUDENT IN AN ORGANIZED HEALTH CARE EDUCATION/TRAINING PROGRAM

## 2023-12-04 PROCEDURE — 99232 SBSQ HOSP IP/OBS MODERATE 35: CPT | Mod: ,,,

## 2023-12-04 PROCEDURE — 36415 COLL VENOUS BLD VENIPUNCTURE: CPT | Performed by: STUDENT IN AN ORGANIZED HEALTH CARE EDUCATION/TRAINING PROGRAM

## 2023-12-04 RX ORDER — POTASSIUM CHLORIDE 20 MEQ/1
40 TABLET, EXTENDED RELEASE ORAL ONCE
Status: COMPLETED | OUTPATIENT
Start: 2023-12-04 | End: 2023-12-04

## 2023-12-04 RX ADMIN — PANTOPRAZOLE SODIUM 40 MG: 40 TABLET, DELAYED RELEASE ORAL at 09:12

## 2023-12-04 RX ADMIN — NITROFURANTOIN MONOHYDRATE/MACROCRYSTALS 100 MG: 25; 75 CAPSULE ORAL at 09:12

## 2023-12-04 RX ADMIN — ACETAMINOPHEN 650 MG: 325 TABLET ORAL at 09:12

## 2023-12-04 RX ADMIN — HEPARIN SODIUM 5000 UNITS: 5000 INJECTION INTRAVENOUS; SUBCUTANEOUS at 06:12

## 2023-12-04 RX ADMIN — APIXABAN 10 MG: 5 TABLET, FILM COATED ORAL at 09:12

## 2023-12-04 RX ADMIN — POTASSIUM CHLORIDE 40 MEQ: 1500 TABLET, EXTENDED RELEASE ORAL at 12:12

## 2023-12-04 RX ADMIN — FUROSEMIDE 40 MG: 40 TABLET ORAL at 09:12

## 2023-12-04 NOTE — ASSESSMENT & PLAN NOTE
Ms. Ana Vasquez presented with acute anemia in setting of positive FOBT and is being treated for lower GI Bleed likely associated with polyp vs diverticula vs proctitis. PMH significan for <5mm polyp on colonoscopy in 2021. CAMERON showed no melena/hematochezia with positive FOBT. Anticoagulants/antiplatelet treatment held in setting of bleed. NSAID therapy discontinued/held. IVF resuscitation and IV PPI bolused/started. Type and screen ordered and CBC currently being trended with stable blood counts noted. No overt signs of GI bleeding noted since admission. Patient confused and multiple attempts to contact family to discuss further workup have not been successful. Multiple providers as well as  have attempted to contact NOK-daughter. NH contacted to try to obtain contact information which is the same information that we have on our records.     --Due to inability to contact NOK and patient without capacity to make medical decisions at this time; will defer procedure. Patient without overt signs of GI bleeding at this time.   --Patient refusing some aspects of her care including labs at this time. Unlikely that she would participate in drinking colonoscopy prep which poses an additional obstacle.   --Will arrange for outpatient EGD/Colonoscopy at a later date if aligns with the GOC with the family once able to be reached.   --Decision to restart anticoagulation to be determined by primary team. Would assess risk vs. Benefit of continuing Eliquis in the setting of possible GI bleed without identification of source.  --RBC transfusion as indicated if Hgb <7 g/dL or if Hgb <8 g/dL with ongoing bleed.   --PPI 40mg PO daily empirically since was previously on NSAIDs; please hold all NSAIDs  --Notify for significant change in patient's clinical status with concern for hemodynamic compromise in the setting of overt Lower GIB.   --If patient has large volume hematochezia with worsening hemodynamics; patient  may warrant urgent CTA with possible IR intervention.        Masseter Units: 0 Dilution (U/0.1 Cc): 4 Expiration Date (Month Year): 5/2024 Consent: Written consent obtained. Risks include but not limited to lid/brow ptosis, bruising, swelling, diplopia, temporary effect, incomplete chemical denervation. Additional Area 2 Units: 3 Including Pricing Information In The Note: No Glabellar Complex Units: 17 Lot #: [id number] Detail Level: Detailed Post-Care Instructions: Patient instructed to not lie down for 4 hours and limit physical activity for 24 hours. Patient instructed not to travel by airplane for 48 hours. Document As Units Or Cc?: units Additional Area 1 Location: crows feet Additional Area 2 Location: lateral brows

## 2023-12-04 NOTE — SUBJECTIVE & OBJECTIVE
Subjective:     Interval History: Followed up with patient for concerns of anemia without overt signs of bleeding since hospitalization. GI consulted for consideration of EGD/Colonoscopy for further evaluation of anemia while inpatient. Patient continued on longterm anticoagulation for DVTs (Eliquis) currently on hold. Multiple attempts have been unsuccessful trying to get in contact with patients MPOA. From NH that has also attempted to contact patients daughter, her MPOA. Patient remains confused, refusing labs and nursing care at this time. States to me that she came from her house that she lives in with her daughter, however, documentation states that patient was in fact in a nursing home. Unable to state why she is in the hospital. Per RN, BM this morning that was normal. No further episode of blood in her stool has been reported. Labs from yesterday reviewed as patient refused labwork this AM. No further transfusion since admit. Pending ethics consult.     Review of Systems   Unable to perform ROS: Mental status change     Objective:     Vital Signs (Most Recent):  Temp: 97.9 °F (36.6 °C) (12/04/23 0800)  Pulse: 96 (12/04/23 0800)  Resp: 17 (12/04/23 0800)  BP: (!) 118/58 (12/04/23 0800)  SpO2: 97 % (12/04/23 0800) Vital Signs (24h Range):  Temp:  [97.9 °F (36.6 °C)-98.9 °F (37.2 °C)] 97.9 °F (36.6 °C)  Pulse:  [86-96] 96  Resp:  [16-18] 17  SpO2:  [95 %-98 %] 97 %  BP: ()/(50-58) 118/58     Weight: 79.4 kg (175 lb 0.7 oz) (11/28/23 0518)  Body mass index is 29.13 kg/m².      Intake/Output Summary (Last 24 hours) at 12/4/2023 0951  Last data filed at 12/4/2023 0725  Gross per 24 hour   Intake --   Output 1375 ml   Net -1375 ml         Lines/Drains/Airways       Drain  Duration             Female External Urinary Catheter 12/02/23 1045 1 day                     Physical Exam  Vitals and nursing note reviewed.   Constitutional:       Appearance: She is not ill-appearing.   HENT:      Mouth/Throat:       Mouth: Mucous membranes are moist.      Pharynx: Oropharynx is clear.   Eyes:      General: No scleral icterus.  Abdominal:      General: Bowel sounds are normal. There is no distension.      Palpations: Abdomen is soft. There is no mass.   Skin:     General: Skin is warm and dry.      Capillary Refill: Capillary refill takes less than 2 seconds.      Coloration: Skin is not jaundiced or pale.   Neurological:      Mental Status: She is alert. She is disoriented.          Significant Labs:  CBC:   Recent Labs   Lab 12/02/23  1834 12/03/23  1918   WBC 7.81 7.12   HGB 9.5* 8.5*   HCT 30.9* 29.0*    156       CMP:   Recent Labs   Lab 12/04/23  0616   GLU 83   CALCIUM 6.9*   ALBUMIN 1.5*   PROT 5.1*      K 3.0*   CO2 27      BUN 19   CREATININE 0.9   ALKPHOS 84   ALT 11   AST 20   BILITOT 0.4       Coagulation:   Recent Labs   Lab 12/04/23  0616   INR 1.2           Significant Imaging:  Imaging results within the past 24 hours have been reviewed.

## 2023-12-04 NOTE — PROGRESS NOTES
[facility] ([place] [address])  Gastroenterology  Progress Note    Patient Name: Ana Vasquez  MRN: [id number]  Admission Date: 11/27/2023  Hospital Length of Stay: 7 days  Code Status: Full Code   Attending Provider: Madison Tubbs DO  Consulting Provider: Franca Collazo NP  Primary Care Physician: Jennifer Schwartz MD  Principal Problem: Symptomatic anemia    Subjective:     Interval History: Followed up with patient for concerns of anemia without overt signs of bleeding since hospitalization. GI consulted for consideration of EGD/Colonoscopy for further evaluation of anemia while inpatient. Patient continued on longterm anticoagulation for DVTs (Eliquis) currently on hold. Multiple attempts have been unsuccessful trying to get in contact with patients MPOA. From NH that has also attempted to contact patients daughter, her MPOA. Patient remains confused, refusing labs and nursing care at this time. States to me that she came from her house that she lives in with her daughter, however, documentation states that patient was in fact in a nursing home. Unable to state why she is in the hospital. Per RN, BM this morning that was normal. No further episode of blood in her stool has been reported. Labs from yesterday reviewed as patient refused labwork this AM. No further transfusion since admit. Pending ethics consult.     Review of Systems   Unable to perform ROS: Mental status change     Objective:     Vital Signs (Most Recent):  Temp: 97.9 °F (36.6 °C) (12/04/23 0800)  Pulse: 96 (12/04/23 0800)  Resp: 17 (12/04/23 0800)  BP: (!) 118/58 (12/04/23 0800)  SpO2: 97 % (12/04/23 0800) Vital Signs (24h Range):  Temp:  [97.9 °F (36.6 °C)-98.9 °F (37.2 °C)] 97.9 °F (36.6 °C)  Pulse:  [86-96] 96  Resp:  [16-18] 17  SpO2:  [95 %-98 %] 97 %  BP: ()/(50-58) 118/58     Weight: 79.4 kg (175 lb 0.7 oz) (11/28/23 0518)  Body mass index is 29.13 kg/m².      Intake/Output Summary (Last 24 hours) at 12/4/2023  0951  Last data filed at 12/4/2023 0725  Gross per 24 hour   Intake --   Output 1375 ml   Net -1375 ml         Lines/Drains/Airways       Drain  Duration             Female External Urinary Catheter 12/02/23 1045 1 day                     Physical Exam  Vitals and nursing note reviewed.   Constitutional:       Appearance: She is not ill-appearing.   HENT:      Mouth/Throat:      Mouth: Mucous membranes are moist.      Pharynx: Oropharynx is clear.   Eyes:      General: No scleral icterus.  Abdominal:      General: Bowel sounds are normal. There is no distension.      Palpations: Abdomen is soft. There is no mass.   Skin:     General: Skin is warm and dry.      Capillary Refill: Capillary refill takes less than 2 seconds.      Coloration: Skin is not jaundiced or pale.   Neurological:      Mental Status: She is alert. She is disoriented.          Significant Labs:  CBC:   Recent Labs   Lab 12/02/23  1834 12/03/23  1918   WBC 7.81 7.12   HGB 9.5* 8.5*   HCT 30.9* 29.0*    156       CMP:   Recent Labs   Lab 12/04/23  0616   GLU 83   CALCIUM 6.9*   ALBUMIN 1.5*   PROT 5.1*      K 3.0*   CO2 27      BUN 19   CREATININE 0.9   ALKPHOS 84   ALT 11   AST 20   BILITOT 0.4       Coagulation:   Recent Labs   Lab 12/04/23  0616   INR 1.2           Significant Imaging:  Imaging results within the past 24 hours have been reviewed.  Assessment/Plan:     GI  Lower GI bleed  Ms. Ana Vasquez presented with acute anemia in setting of positive FOBT and is being treated for lower GI Bleed likely associated with polyp vs diverticula vs proctitis. PMH significan for <5mm polyp on colonoscopy in 2021. CAMERON showed no melena/hematochezia with positive FOBT. Anticoagulants/antiplatelet treatment held in setting of bleed. NSAID therapy discontinued/held. IVF resuscitation and IV PPI bolused/started. Type and screen ordered and CBC currently being trended with stable blood counts noted. No overt signs of GI bleeding noted  since admission. Patient confused and multiple attempts to contact family to discuss further workup have not been successful. Multiple providers as well as  have attempted to contact NOK-daughter. NH contacted to try to obtain contact information which is the same information that we have on our records.     --Due to inability to contact NOK and patient without capacity to make medical decisions at this time; will defer procedure. Patient without overt signs of GI bleeding at this time.   --Patient refusing some aspects of her care including labs at this time. Unlikely that she would participate in drinking colonoscopy prep which poses an additional obstacle.   --Will arrange for outpatient EGD/Colonoscopy at a later date if aligns with the [facility] with the family once able to be reached.   --Decision to restart anticoagulation to be determined by primary team. Would assess risk vs. Benefit of continuing Eliquis in the setting of possible GI bleed without identification of source.  --RBC transfusion as indicated if Hgb <7 g/dL or if Hgb <8 g/dL with ongoing bleed.   --PPI 40mg PO daily empirically since was previously on NSAIDs; please hold all NSAIDs  --Notify for significant change in patient's clinical status with concern for hemodynamic compromise in the setting of overt Lower GIB.   --If patient has large volume hematochezia with worsening hemodynamics; patient may warrant urgent CTA with possible IR intervention.             Thank you for your consult. After careful review of labs and patient current status with the GI staff and fellow, it has been decided thatI will sign off. Please contact us if you have any additional questions.    Franca Collazo NP  Gastroenterology  [facility] ([place]

## 2023-12-04 NOTE — NURSING
Patient had ultrasound of extremities done over night. 1BM no signs of blood in stool, incontinence.

## 2023-12-04 NOTE — PLAN OF CARE
12/04/23 0843   Discharge Reassessment   Assessment Type Discharge Planning Reassessment   Discharge Plan discussed with: Patient   Communicated BUBBA with patient/caregiver Yes   Discharge Plan A Return to nursing home  ([facility]: [phone])   Discharge Plan B Return to Nursing Home   DME Needed Upon Discharge  none   Transition of Care Barriers None   Why the patient remains in the hospital Requires continued medical care   Post-Acute Status   Post-Acute Authorization Placement   Post-Acute Placement Status Set-up Complete/Auth obtained   Coverage MEDICARE - MEDICARE PART A & B -   Discharge Delays None known at this time     CM met with patient  to discuss any changes in discharge planning.  Patients plan is to  dc to   [facility]: [phone]  No changes in DC plans. BUBBA:  12/05/23    CM  sent updates and BUBBA to [facility]: [phone]   via [employer].     9:26 am  CM spoke with Titus @ [facility] the SW stated, ; the patients daughter usually comes early to vist the patient and will attempt to get an updated contact number for the patients daughter Brianna Oenil (Daughter)  [phone] (Mobile)       Bioethics has been consulted    Kira Russell RN  Case Management  [facility]  [phone]

## 2023-12-04 NOTE — SIGNIFICANT EVENT
Contacted regarding new partial occlusive DVT on most recent imaging. Review of chart demonstrates a recent history of major bleeding and I will hold off on anticoagulation until day team unless the patient develops new signs or symptoms of clot migration.

## 2023-12-04 NOTE — PLAN OF CARE
Problem: Adult Inpatient Plan of Care  Goal: Readiness for Transition of Care  12/3/2023 2220 by Louann Woods RN  Outcome: Ongoing, Progressing     Problem: Adult Inpatient Plan of Care  Goal: Absence of Hospital-Acquired Illness or Injury  12/3/2023 1543 by Louann Woods RN  Outcome: Ongoing, Progressing     Problem: Infection  Goal: Absence of Infection Signs and Symptoms  12/3/2023 2220 by Louann Woods RN  Outcome: Ongoing, Progressing     Problem: Impaired Wound Healing  Goal: Optimal Wound Healing  12/3/2023 2220 by Louann Woods RN  Outcome: Ongoing, Progressing     Problem: Fall Injury Risk  Goal: Absence of Fall and Fall-Related Injury  12/3/2023 2220 by Louann Woods RN  Outcome: Ongoing, Progressing     Problem: Skin Injury Risk Increased  Goal: Skin Health and Integrity  12/3/2023 2220 by Louann Woods RN  Outcome: Ongoing, Progressing  12/3/2023 1543 by Louann Woods RN  Outcome: Ongoing, Progressing

## 2023-12-05 PROBLEM — E87.1 HYPONATREMIA: Status: ACTIVE | Noted: 2023-12-05

## 2023-12-05 LAB
ALBUMIN SERPL BCP-MCNC: 1.5 G/DL (ref 3.5–5.2)
ALP SERPL-CCNC: 81 U/L (ref 55–135)
ALT SERPL W/O P-5'-P-CCNC: 12 U/L (ref 10–44)
ANION GAP SERPL CALC-SCNC: 8 MMOL/L (ref 8–16)
ANISOCYTOSIS BLD QL SMEAR: ABNORMAL
ANISOCYTOSIS BLD QL SMEAR: ABNORMAL
AST SERPL-CCNC: 27 U/L (ref 10–40)
BASOPHILS # BLD AUTO: 0.05 K/UL (ref 0–0.2)
BASOPHILS # BLD AUTO: 0.05 K/UL (ref 0–0.2)
BASOPHILS # BLD AUTO: 0.06 K/UL (ref 0–0.2)
BASOPHILS # BLD AUTO: 0.06 K/UL (ref 0–0.2)
BASOPHILS NFR BLD: 0.8 % (ref 0–1.9)
BASOPHILS NFR BLD: 0.8 % (ref 0–1.9)
BASOPHILS NFR BLD: 1 % (ref 0–1.9)
BASOPHILS NFR BLD: 1 % (ref 0–1.9)
BILIRUB SERPL-MCNC: 0.4 MG/DL (ref 0.1–1)
BUN SERPL-MCNC: 16 MG/DL (ref 8–23)
BURR CELLS BLD QL SMEAR: ABNORMAL
CALCIUM SERPL-MCNC: 7 MG/DL (ref 8.7–10.5)
CHLORIDE SERPL-SCNC: 103 MMOL/L (ref 95–110)
CO2 SERPL-SCNC: 24 MMOL/L (ref 23–29)
CREAT SERPL-MCNC: 0.8 MG/DL (ref 0.5–1.4)
DIFFERENTIAL METHOD: ABNORMAL
EOSINOPHIL # BLD AUTO: 0.2 K/UL (ref 0–0.5)
EOSINOPHIL # BLD AUTO: 0.3 K/UL (ref 0–0.5)
EOSINOPHIL NFR BLD: 3.1 % (ref 0–8)
EOSINOPHIL NFR BLD: 4.1 % (ref 0–8)
EOSINOPHIL NFR BLD: 4.1 % (ref 0–8)
EOSINOPHIL NFR BLD: 5.4 % (ref 0–8)
ERYTHROCYTE [DISTWIDTH] IN BLOOD BY AUTOMATED COUNT: ABNORMAL % (ref 11.5–14.5)
EST. GFR  (NO RACE VARIABLE): >60 ML/MIN/1.73 M^2
GLUCOSE SERPL-MCNC: 74 MG/DL (ref 70–110)
HCT VFR BLD AUTO: 29 % (ref 37–48.5)
HCT VFR BLD AUTO: 29 % (ref 37–48.5)
HCT VFR BLD AUTO: 30.4 % (ref 37–48.5)
HCT VFR BLD AUTO: 31.7 % (ref 37–48.5)
HGB BLD-MCNC: 8.6 G/DL (ref 12–16)
HGB BLD-MCNC: 8.6 G/DL (ref 12–16)
HGB BLD-MCNC: 8.9 G/DL (ref 12–16)
HGB BLD-MCNC: 9.3 G/DL (ref 12–16)
HYPOCHROMIA BLD QL SMEAR: ABNORMAL
HYPOCHROMIA BLD QL SMEAR: ABNORMAL
IMM GRANULOCYTES # BLD AUTO: 0.01 K/UL (ref 0–0.04)
IMM GRANULOCYTES # BLD AUTO: 0.02 K/UL (ref 0–0.04)
IMM GRANULOCYTES NFR BLD AUTO: 0.2 % (ref 0–0.5)
IMM GRANULOCYTES NFR BLD AUTO: 0.3 % (ref 0–0.5)
INR PPP: 1.3 (ref 0.8–1.2)
LYMPHOCYTES # BLD AUTO: 1.4 K/UL (ref 1–4.8)
LYMPHOCYTES # BLD AUTO: 1.7 K/UL (ref 1–4.8)
LYMPHOCYTES # BLD AUTO: 2 K/UL (ref 1–4.8)
LYMPHOCYTES # BLD AUTO: 2 K/UL (ref 1–4.8)
LYMPHOCYTES NFR BLD: 23 % (ref 18–48)
LYMPHOCYTES NFR BLD: 29.2 % (ref 18–48)
LYMPHOCYTES NFR BLD: 30.6 % (ref 18–48)
LYMPHOCYTES NFR BLD: 30.6 % (ref 18–48)
MCH RBC QN AUTO: 23.4 PG (ref 27–31)
MCH RBC QN AUTO: 23.4 PG (ref 27–31)
MCH RBC QN AUTO: 23.5 PG (ref 27–31)
MCH RBC QN AUTO: 23.7 PG (ref 27–31)
MCHC RBC AUTO-ENTMCNC: 29.3 G/DL (ref 32–36)
MCHC RBC AUTO-ENTMCNC: 29.3 G/DL (ref 32–36)
MCHC RBC AUTO-ENTMCNC: 29.7 G/DL (ref 32–36)
MCHC RBC AUTO-ENTMCNC: 29.7 G/DL (ref 32–36)
MCV RBC AUTO: 79 FL (ref 82–98)
MCV RBC AUTO: 79 FL (ref 82–98)
MCV RBC AUTO: 80 FL (ref 82–98)
MCV RBC AUTO: 81 FL (ref 82–98)
MONOCYTES # BLD AUTO: 0.4 K/UL (ref 0.3–1)
MONOCYTES # BLD AUTO: 0.5 K/UL (ref 0.3–1)
MONOCYTES NFR BLD: 6.6 % (ref 4–15)
MONOCYTES NFR BLD: 7.7 % (ref 4–15)
MONOCYTES NFR BLD: 7.7 % (ref 4–15)
MONOCYTES NFR BLD: 9.1 % (ref 4–15)
NEUTROPHILS # BLD AUTO: 3.3 K/UL (ref 1.8–7.7)
NEUTROPHILS # BLD AUTO: 3.6 K/UL (ref 1.8–7.7)
NEUTROPHILS # BLD AUTO: 3.6 K/UL (ref 1.8–7.7)
NEUTROPHILS # BLD AUTO: 4.1 K/UL (ref 1.8–7.7)
NEUTROPHILS NFR BLD: 55 % (ref 38–73)
NEUTROPHILS NFR BLD: 56.5 % (ref 38–73)
NEUTROPHILS NFR BLD: 56.5 % (ref 38–73)
NEUTROPHILS NFR BLD: 66.1 % (ref 38–73)
NRBC BLD-RTO: 0 /100 WBC
OVALOCYTES BLD QL SMEAR: ABNORMAL
OVALOCYTES BLD QL SMEAR: ABNORMAL
PLATELET # BLD AUTO: 183 K/UL (ref 150–450)
PLATELET # BLD AUTO: 183 K/UL (ref 150–450)
PLATELET # BLD AUTO: 186 K/UL (ref 150–450)
PLATELET # BLD AUTO: 195 K/UL (ref 150–450)
PLATELET BLD QL SMEAR: ABNORMAL
PLATELET BLD QL SMEAR: ABNORMAL
PMV BLD AUTO: 8.9 FL (ref 9.2–12.9)
PMV BLD AUTO: 9.1 FL (ref 9.2–12.9)
POIKILOCYTOSIS BLD QL SMEAR: SLIGHT
POIKILOCYTOSIS BLD QL SMEAR: SLIGHT
POLYCHROMASIA BLD QL SMEAR: ABNORMAL
POLYCHROMASIA BLD QL SMEAR: ABNORMAL
POTASSIUM SERPL-SCNC: 3.7 MMOL/L (ref 3.5–5.1)
PROT SERPL-MCNC: 5.4 G/DL (ref 6–8.4)
PROTHROMBIN TIME: 13.9 SEC (ref 9–12.5)
RBC # BLD AUTO: 3.68 M/UL (ref 4–5.4)
RBC # BLD AUTO: 3.68 M/UL (ref 4–5.4)
RBC # BLD AUTO: 3.76 M/UL (ref 4–5.4)
RBC # BLD AUTO: 3.96 M/UL (ref 4–5.4)
SCHISTOCYTES BLD QL SMEAR: ABNORMAL
SODIUM SERPL-SCNC: 135 MMOL/L (ref 136–145)
SPHEROCYTES BLD QL SMEAR: ABNORMAL
TARGETS BLD QL SMEAR: ABNORMAL
TARGETS BLD QL SMEAR: ABNORMAL
WBC # BLD AUTO: 5.93 K/UL (ref 3.9–12.7)
WBC # BLD AUTO: 6.17 K/UL (ref 3.9–12.7)
WBC # BLD AUTO: 6.37 K/UL (ref 3.9–12.7)
WBC # BLD AUTO: 6.37 K/UL (ref 3.9–12.7)

## 2023-12-05 PROCEDURE — 25000003 PHARM REV CODE 250

## 2023-12-05 PROCEDURE — 25000003 PHARM REV CODE 250: Performed by: STUDENT IN AN ORGANIZED HEALTH CARE EDUCATION/TRAINING PROGRAM

## 2023-12-05 PROCEDURE — 25000003 PHARM REV CODE 250: Performed by: INTERNAL MEDICINE

## 2023-12-05 PROCEDURE — 85610 PROTHROMBIN TIME: CPT | Performed by: STUDENT IN AN ORGANIZED HEALTH CARE EDUCATION/TRAINING PROGRAM

## 2023-12-05 PROCEDURE — 36415 COLL VENOUS BLD VENIPUNCTURE: CPT | Performed by: STUDENT IN AN ORGANIZED HEALTH CARE EDUCATION/TRAINING PROGRAM

## 2023-12-05 PROCEDURE — 85025 COMPLETE CBC W/AUTO DIFF WBC: CPT | Mod: 91 | Performed by: STUDENT IN AN ORGANIZED HEALTH CARE EDUCATION/TRAINING PROGRAM

## 2023-12-05 PROCEDURE — 80053 COMPREHEN METABOLIC PANEL: CPT | Performed by: STUDENT IN AN ORGANIZED HEALTH CARE EDUCATION/TRAINING PROGRAM

## 2023-12-05 PROCEDURE — 20600001 HC STEP DOWN PRIVATE ROOM

## 2023-12-05 RX ORDER — HYDROCODONE BITARTRATE AND ACETAMINOPHEN 5; 325 MG/1; MG/1
1 TABLET ORAL ONCE
Status: COMPLETED | OUTPATIENT
Start: 2023-12-05 | End: 2023-12-05

## 2023-12-05 RX ADMIN — NITROFURANTOIN MONOHYDRATE/MACROCRYSTALS 100 MG: 25; 75 CAPSULE ORAL at 09:12

## 2023-12-05 RX ADMIN — APIXABAN 10 MG: 5 TABLET, FILM COATED ORAL at 10:12

## 2023-12-05 RX ADMIN — ACETAMINOPHEN 650 MG: 325 TABLET ORAL at 10:12

## 2023-12-05 RX ADMIN — HYDROCODONE BITARTRATE AND ACETAMINOPHEN 1 TABLET: 5; 325 TABLET ORAL at 04:12

## 2023-12-05 RX ADMIN — PANTOPRAZOLE SODIUM 40 MG: 40 TABLET, DELAYED RELEASE ORAL at 09:12

## 2023-12-05 RX ADMIN — APIXABAN 10 MG: 5 TABLET, FILM COATED ORAL at 09:12

## 2023-12-05 NOTE — SUBJECTIVE & OBJECTIVE
Interval History: see above.      Objective:     Vital Signs (Most Recent):  Temp: 98.2 °F (36.8 °C) (12/04/23 1935)  Pulse: 96 (12/04/23 1935)  Resp: 18 (12/04/23 1935)  BP: (!) 106/53 (12/04/23 1935)  SpO2: 99 % (12/04/23 1935) Vital Signs (24h Range):  Temp:  [97.9 °F (36.6 °C)-98.2 °F (36.8 °C)] 98.2 °F (36.8 °C)  Pulse:  [86-96] 96  Resp:  [16-18] 18  SpO2:  [96 %-99 %] 99 %  BP: ()/(50-58) 106/53     Weight: 79.4 kg (175 lb)  Body mass index is 29.12 kg/m².    Intake/Output Summary (Last 24 hours) at 12/4/2023 2137  Last data filed at 12/4/2023 1735  Gross per 24 hour   Intake --   Output 2175 ml   Net -2175 ml         Physical Exam  Vitals and nursing note reviewed.   Constitutional:       General: She is not in acute distress.     Appearance: She is well-developed. She is obese.   HENT:      Head: Normocephalic and atraumatic.      Nose: Nose normal.      Mouth/Throat:      Mouth: Mucous membranes are moist.      Pharynx: Oropharynx is clear.   Eyes:      Extraocular Movements: Extraocular movements intact.      Pupils: Pupils are equal, round, and reactive to light.   Cardiovascular:      Rate and Rhythm: Normal rate and regular rhythm.   Pulmonary:      Effort: Pulmonary effort is normal.      Breath sounds: Normal breath sounds.   Abdominal:      General: Abdomen is flat. Bowel sounds are normal. There is no distension.      Palpations: Abdomen is soft.      Tenderness: There is no abdominal tenderness.   Musculoskeletal:         General: No tenderness.   Skin:     General: Skin is warm and dry.   Neurological:      Mental Status: She is alert.      Comments: Oriented to person, place, and year. Reports she doesn't keep up with the date anymore, unable to provide daughter's numbers.    Psychiatric:         Behavior: Behavior normal.             Significant Labs: All pertinent labs within the past 24 hours have been reviewed.    Significant Imaging: I have reviewed all pertinent imaging  results/findings within the past 24 hours.

## 2023-12-05 NOTE — PLAN OF CARE
Problem: Impaired Wound Healing  Goal: Optimal Wound Healing  Outcome: Ongoing, Progressing     Problem: Skin Injury Risk Increased  Goal: Skin Health and Integrity  Outcome: Ongoing, Progressing     Problem: Fall Injury Risk  Goal: Absence of Fall and Fall-Related Injury  Outcome: Ongoing, Progressing     Problem: Adult Inpatient Plan of Care  Goal: Optimal Comfort and Wellbeing  Outcome: Ongoing, Progressing

## 2023-12-05 NOTE — PLAN OF CARE
Pt slept well this shift. A&Ox 3. VSS. Pain controlled with PRN's. Purewick in place. No concerns noted. Pt refused to reposition much. Skin care completed this AM. Pt refused incontinence care this AM 2/2 pain to BLE. MD notified and one time order given with effect. Incontinence care performed this AM. Safety precautions in place. Call light in reach. No further concerns noted at this time.    Problem: Adult Inpatient Plan of Care  Goal: Plan of Care Review  Outcome: Ongoing, Progressing  Goal: Patient-Specific Goal (Individualized)  Outcome: Ongoing, Progressing  Goal: Absence of Hospital-Acquired Illness or Injury  Outcome: Ongoing, Progressing  Goal: Optimal Comfort and Wellbeing  Outcome: Ongoing, Progressing  Goal: Readiness for Transition of Care  Outcome: Ongoing, Progressing     Problem: Infection  Goal: Absence of Infection Signs and Symptoms  Outcome: Ongoing, Progressing     Problem: Impaired Wound Healing  Goal: Optimal Wound Healing  Outcome: Ongoing, Progressing     Problem: Skin Injury Risk Increased  Goal: Skin Health and Integrity  Outcome: Ongoing, Progressing     Problem: Fall Injury Risk  Goal: Absence of Fall and Fall-Related Injury  Outcome: Ongoing, Progressing

## 2023-12-05 NOTE — PLAN OF CARE
I have reviewed the chart of Ana Vasquez and collaborated with Madison Tubbs DO in the care of the patient who is hospitalized for the following:    Active Hospital Problems    Diagnosis    *Symptomatic anemia    Hyponatremia    Acute deep vein thrombosis (DVT) of femoral vein of right lower extremity    Lower extremity edema    Acute cystitis    Lower GI bleed    Hypokalemia    Acute blood loss anemia    Chronic deep vein thrombosis (DVT) of proximal vein of right lower extremity     Pt is an 81yoF with PMHx of chronic venous insufficiency/venous HTN s/p stent previously followed by vascular surgery in [place] who presented to the ED on 7/21 complaining of worsening swelling and pain her RLE x1 day. She tested positive for COVID-19 in the ED, but she denies any cough, fatigue, malaise, fevers, chills, nausea, or vomiting. An US was performed on her leg, revealing a DVT.       Vascular surgery was consulted for management of her DVT. Pt has a history of DVT in her left leg, and she states that she was on anticoagulation injections of some sort, but she stopped taking the injections a while back. She denies taking any oral anticoagulants. She also reports daily smoking. Pt reports that prior to the swelling that started a couple days ago, she was able to walk around with no issues. Now she is unable to walk due to extensive swelling. Baseline swelling and skin color changes appreciated on her left leg, but her right leg was notable more edematous. +3 pitting edema to her right leg appreciated on exam. Right DP signals appreciated on doppler. Pt denies any sensory deficits and was able to move her toes.          Essential hypertension          I have reviewed the Ana Vasquez with the multidisciplinary team during rounds.      Jenny Mckeon NP  [facility]

## 2023-12-05 NOTE — PROGRESS NOTES
"[facility] ([address] Medicine  Progress Note    Patient Name: Ana Vasquez  MRN: [id number]  Patient Class: IP- Inpatient   Admission Date: 11/27/2023  Length of Stay: 7 days  Attending Physician: Madison Tubbs DO  Primary Care Provider: Jennfier Schwartz MD        Subjective:     Principal Problem:Symptomatic anemia        HPI:  Ana Vasquez is a 83 y.o. female with a hx of DVT and HTN presents to the ED from her NH for shortness of breath with exertion. Patient is a poor historian. Needing frequent redirection during the interview. Patient unable to tell me why she is here. When asked about her needing blood, she states "she always has low blood level." Per ED provider, " She reports someone in her nursing home told her that her blood levels were low but she always has "low blood levels". She denies change in recent medications and thinks she continues to take Eliquis.  She denies noting recent black or tarry stools, gnawing epigastric pain, nausea, vomiting, chest pain arrest, swelling of the extremities, fever, cough.  She denies knowledge of past gastrointestinal bleeding episodes.  She thinks she takes a multivitamin but is unsure if it may contain iron." Patient denies any rectal bleeding, hematuria and hemoptysis on my exam. Denies any further complaints. Endorses improvement of shortness of breath.     ED: AF, tachycardic and hypotensive on admission. Hgb 4.3. K 3.3. . CXR showed no acute process. 2 units pRBCs ordered in ED and transfused. GI consulted. Given IV protonix in ED.     Overview/Hospital Course:  Patient admitted for anemia 2/2 possible GIB as patient on eliquis at home. AC held. Transfused 3 units,  trending H/H. GI consulted. No melena or bleeding noted. UA infectious and started on CTX, Ucx GNR awaiting speciation. Unable to contact family, contact numbers on chart are out of service, contacted NH who have the same numbers on file. Attempting " "to contact family for consent for scope as patient with underlying cognitive decline and unable to consent herself per GI. If unable to contact family, plan for scope outpt. H/H stable and no further bleeding. Risk benefit for restarting eliquis, for prior DVT in 2021. CARLITOS u/s ordered to eval DVT.     Discussed the case with case management this morning.  Patient is on Eliquis at home, and currently the patient is high-risk for bleeding as she presented with a hemoglobin of only 4.  Therefore we require ethics consult as were not able to obtain a consent for an endoscopy vs.  Stopping her Eliquis. There is worsening of her lower extremity edema.  Plan to start her on furosemide and obtain an echocardiogram.     Had a lengthy discussion this morning with the patient.  Patient reports she has had heavy periods when she was younger.  She feels that she has a tendency to easily bleed at times.  Discussed with patient that she is taking a blood thinner to stop her from forming a clot and it is risky to stop it as she may form another clot.  She is also risky to give it to her as she may bleed.  Patient points at her hands and reported she has had multiple sticks and she is tired of them.  Reports that she had significant pain during childbirth "however I ended up with the baby in my hand which was very rewarding, I am going through this much pain and I do not feel there is a reward at the end.  That has been said open to whatever you suggest at this point.  Would prefer a pill anticoagulant." Discussed with the patient that heparin which is the IV form can be reversed in the case of an emergency and would like to start with that if she is agreeable.  Patient thought about it for a few seconds and then looked at me and then said "I am okay with IV form of anticoagulant".  I asked the patient if she understands the risks of it.. "Yeah yeah..  Causes bleeding." She has awareness of the situation. Most of my conversation with " the patient have been logical. However, I understand the GI fellow's concerns given her mental status therefore we are proceeding with caution regarding anticoagulation and consulting with ethics regarding discharge home with Eliquis vs. endoscopy. Contacted the NH and spoke with her nurse there. I appreciate their assistance. She said that they are planning to reach out to the director and/or  on site to assess the family situation.       Today:  DVT lower extremity was discovered yesterday on ultrasound.  Starting on treatment dose of Eliquis. Discussed this with GI. Reports that patient has been refusing labs and would unlikely to cooperate with colonoscopy prep. I am not sure if this was actually discussed with the patient. There is an underlying dementia, however she is aware of the situation. GI would like to intervene only if patient has another large episode of hematochezia. At that point it would be emergent. Appreciate GI assistance.    Interval History: see above.      Objective:     Vital Signs (Most Recent):  Temp: 98.2 °F (36.8 °C) (12/04/23 1935)  Pulse: 96 (12/04/23 1935)  Resp: 18 (12/04/23 1935)  BP: (!) 106/53 (12/04/23 1935)  SpO2: 99 % (12/04/23 1935) Vital Signs (24h Range):  Temp:  [97.9 °F (36.6 °C)-98.2 °F (36.8 °C)] 98.2 °F (36.8 °C)  Pulse:  [86-96] 96  Resp:  [16-18] 18  SpO2:  [96 %-99 %] 99 %  BP: ()/(50-58) 106/53     Weight: 79.4 kg (175 lb)  Body mass index is 29.12 kg/m².    Intake/Output Summary (Last 24 hours) at 12/4/2023 2137  Last data filed at 12/4/2023 1735  Gross per 24 hour   Intake --   Output 2175 ml   Net -2175 ml         Physical Exam  Vitals and nursing note reviewed.   Constitutional:       General: She is not in acute distress.     Appearance: She is well-developed. She is obese.   HENT:      Head: Normocephalic and atraumatic.      Nose: Nose normal.      Mouth/Throat:      Mouth: Mucous membranes are moist.      Pharynx: Oropharynx is clear.    Eyes:      Extraocular Movements: Extraocular movements intact.      Pupils: Pupils are equal, round, and reactive to light.   Cardiovascular:      Rate and Rhythm: Normal rate and regular rhythm.   Pulmonary:      Effort: Pulmonary effort is normal.      Breath sounds: Normal breath sounds.   Abdominal:      General: Abdomen is flat. Bowel sounds are normal. There is no distension.      Palpations: Abdomen is soft.      Tenderness: There is no abdominal tenderness.   Musculoskeletal:         General: No tenderness.   Skin:     General: Skin is warm and dry.   Neurological:      Mental Status: She is alert.      Comments: Oriented to person, place, and year. Reports she doesn't keep up with the date anymore, unable to provide daughter's numbers.    Psychiatric:         Behavior: Behavior normal.             Significant Labs: All pertinent labs within the past 24 hours have been reviewed.    Significant Imaging: I have reviewed all pertinent imaging results/findings within the past 24 hours.    Assessment/Plan:      * Symptomatic anemia  Acute blood loss anemia  Patient's anemia is currently controlled. Has received 3 units of PRBCs on 11/27 . Etiology likely d/t acute blood loss which was from concern for GIB  Current CBC reviewed-   Lab Results   Component Value Date    HGB 8.5 (L) 11/29/2023    HCT 27.4 (L) 11/29/2023     Monitor serial CBC and transfuse if patient becomes hemodynamically unstable, symptomatic or H/H drops below 7/21.  - last known Hgb was ~11 in 2021, pt has Eliquis on med list but she cannot say if she is taking it  - guaiac positive  - Hgb 4.6>> receiving 2 units in ED, another unit on floor  - anemia panel neg  - CBC q8  - transfuse Hgb <7  - GI consulted, appreciate assistance   - low threshold for ICU care if becomes hemodynamically unstable    - GI recommend scope but given patient unable to consent herself and unable to contact family elected for outpt scope   - cont PPI   - will cont to  "try and get family contact, emergency contact numbers on file are nonworking. Unfortunately no family has come by and NH has the same nonworking numbers on file. However patient reports she keeps numbers in her purse at the NH so will re attempt to contact NH to get family contact     Acute deep vein thrombosis (DVT) of femoral vein of right lower extremity  -- Starting Eliquis 10 mg for 7 days then 5 mg.   -- Discussed with patient, she replied "I understand that there is a high risk of bleeding."      Lower extremity edema  -- start with furosemide 40 mg b.i.d. p.o..  -- strict I&O  -- echocardiogram, as none on file.  -- US DVT LE for history of iliac vein stenosis s/p stenting which then thrmobiss again. Been holding off AC, GI recommendations not to do endoscopy as the bleeding was stabilized with blood transfusion. Therefore, it was deemed by GI as non emergent and outpatient endoscopy was recommended. I felt uncomfrtable with the dc, especially restarting eliquis. Reached out to GI again and case management. Another attempt to get a hold family was still unsuccessful. It was determined that it is best to consult ethics on this case.       Acute cystitis  UA infectious, foul smelling  - Ucx GNR  - CTX pending cultures  -- sensitive to Macrobid, patient refused an IV medicine at this point.  It is okay to switch to Macrobid    Hypokalemia  Patient has hypokalemia which is Acute and currently controlled. Most recent potassium levels reviewed-   Lab Results   Component Value Date    K 3.0 (L) 11/29/2023   . Will continue potassium replacement per protocol and recheck repeat levels after replacement completed.     Lower GI bleed  - FOBT positive   - Hgb 4 on admit, baseline around ~11 2 years ago  - continue IV protonix infusion  - GI consulted, appreciate assistance  - Type and screen, blood consent obtained  - CBCq 8h.  Transfuse for Hemoglobin <7  - Coagulopathy goals:  Plt >50, INR <1.5  - GI recommend scope, " but unable to contact family for consent, given this recommend outpt scope at later time   - H/H stable post transfusion and no blood in BM noted by nursing, however H/H slowly decreasing   - cont PO PPI  - refer to GI outpt for scope in near future once family able to be contacted    Acute blood loss anemia  Patient's anemia is currently controlled. Has received 3 units of PRBCs on 11/27 . Etiology likely d/t acute blood loss concern for GIB  Current CBC reviewed-   Lab Results   Component Value Date    HGB 8.3 (L) 11/30/2023    HCT 27.4 (L) 11/30/2023     Monitor serial CBC and transfuse if patient becomes hemodynamically unstable, symptomatic or H/H drops below 7/21.    Trending down slowly. Maroon colored stool. Reaching out to GI     Chronic deep vein thrombosis (DVT) of proximal vein of right lower extremity  - hx noted  - holding Eliquis in setting of acute blood loss  - will restart when clinically indicated  - will place sequential compression devices     - LE u/s ordered to reeval clot to assist in risk benefit of restarting eliquis      Essential hypertension  - hx noted, hypotensive on admission  - monitor volume status/ hemodynamics closely with acute blood loss  - BP improved, monitor and adjust regimen as needed      VTE Risk Mitigation (From admission, onward)           Ordered     apixaban tablet 10 mg  2 times daily         12/04/23 1807     Place sequential compression device  Until discontinued         11/27/23 1608     IP VTE HIGH RISK PATIENT  Once         11/27/23 1412     Reason for No Pharmacological VTE Prophylaxis  Once        Question:  Reasons:  Answer:  Active Bleeding    11/27/23 1412                    Discharge Planning   BUBBA: 12/5/2023     Code Status: Full Code   Is the patient medically ready for discharge?: No    Reason for patient still in hospital (select all that apply): Patient trending condition and Treatment  Discharge Plan A: Return to [facility] ([facility] Phone: [phone])   Discharge Delays: None known at this time              Madison Tubbs DO  Department of Hospital Medicine   [facility] - Intensive Care ([place]-[facility])

## 2023-12-05 NOTE — ASSESSMENT & PLAN NOTE
"-- Starting Eliquis 10 mg for 7 days then 5 mg.   -- Discussed with patient, she replied "I understand that there is a high risk of bleeding."    "

## 2023-12-06 LAB
ALBUMIN SERPL BCP-MCNC: 1.6 G/DL (ref 3.5–5.2)
ALP SERPL-CCNC: 94 U/L (ref 55–135)
ALT SERPL W/O P-5'-P-CCNC: 12 U/L (ref 10–44)
ANION GAP SERPL CALC-SCNC: 8 MMOL/L (ref 8–16)
ANISOCYTOSIS BLD QL SMEAR: ABNORMAL
AST SERPL-CCNC: 20 U/L (ref 10–40)
BASOPHILS # BLD AUTO: 0.05 K/UL (ref 0–0.2)
BASOPHILS # BLD AUTO: 0.06 K/UL (ref 0–0.2)
BASOPHILS NFR BLD: 0.8 % (ref 0–1.9)
BASOPHILS NFR BLD: 1 % (ref 0–1.9)
BILIRUB SERPL-MCNC: 0.5 MG/DL (ref 0.1–1)
BUN SERPL-MCNC: 15 MG/DL (ref 8–23)
CALCIUM SERPL-MCNC: 7.7 MG/DL (ref 8.7–10.5)
CHLORIDE SERPL-SCNC: 102 MMOL/L (ref 95–110)
CO2 SERPL-SCNC: 26 MMOL/L (ref 23–29)
CREAT SERPL-MCNC: 0.9 MG/DL (ref 0.5–1.4)
DIFFERENTIAL METHOD: ABNORMAL
DIFFERENTIAL METHOD: ABNORMAL
DOHLE BOD BLD QL SMEAR: PRESENT
EOSINOPHIL # BLD AUTO: 0.2 K/UL (ref 0–0.5)
EOSINOPHIL # BLD AUTO: 0.2 K/UL (ref 0–0.5)
EOSINOPHIL NFR BLD: 3.5 % (ref 0–8)
EOSINOPHIL NFR BLD: 3.9 % (ref 0–8)
ERYTHROCYTE [DISTWIDTH] IN BLOOD BY AUTOMATED COUNT: ABNORMAL % (ref 11.5–14.5)
ERYTHROCYTE [DISTWIDTH] IN BLOOD BY AUTOMATED COUNT: ABNORMAL % (ref 11.5–14.5)
EST. GFR  (NO RACE VARIABLE): >60 ML/MIN/1.73 M^2
GLUCOSE SERPL-MCNC: 79 MG/DL (ref 70–110)
HCT VFR BLD AUTO: 27.1 % (ref 37–48.5)
HCT VFR BLD AUTO: 30.8 % (ref 37–48.5)
HGB BLD-MCNC: 8.5 G/DL (ref 12–16)
HGB BLD-MCNC: 9.4 G/DL (ref 12–16)
HYPOCHROMIA BLD QL SMEAR: ABNORMAL
IMM GRANULOCYTES # BLD AUTO: 0.02 K/UL (ref 0–0.04)
IMM GRANULOCYTES # BLD AUTO: 0.04 K/UL (ref 0–0.04)
IMM GRANULOCYTES NFR BLD AUTO: 0.3 % (ref 0–0.5)
IMM GRANULOCYTES NFR BLD AUTO: 0.6 % (ref 0–0.5)
INR PPP: 1.3 (ref 0.8–1.2)
LYMPHOCYTES # BLD AUTO: 1.6 K/UL (ref 1–4.8)
LYMPHOCYTES # BLD AUTO: 1.9 K/UL (ref 1–4.8)
LYMPHOCYTES NFR BLD: 24.8 % (ref 18–48)
LYMPHOCYTES NFR BLD: 32.3 % (ref 18–48)
MCH RBC QN AUTO: 23.9 PG (ref 27–31)
MCH RBC QN AUTO: 24.1 PG (ref 27–31)
MCHC RBC AUTO-ENTMCNC: 30.5 G/DL (ref 32–36)
MCHC RBC AUTO-ENTMCNC: 31.4 G/DL (ref 32–36)
MCV RBC AUTO: 76 FL (ref 82–98)
MCV RBC AUTO: 79 FL (ref 82–98)
MONOCYTES # BLD AUTO: 0.6 K/UL (ref 0.3–1)
MONOCYTES # BLD AUTO: 0.6 K/UL (ref 0.3–1)
MONOCYTES NFR BLD: 9 % (ref 4–15)
MONOCYTES NFR BLD: 9.9 % (ref 4–15)
NEUTROPHILS # BLD AUTO: 3.1 K/UL (ref 1.8–7.7)
NEUTROPHILS # BLD AUTO: 4 K/UL (ref 1.8–7.7)
NEUTROPHILS NFR BLD: 52.6 % (ref 38–73)
NEUTROPHILS NFR BLD: 61.3 % (ref 38–73)
NRBC BLD-RTO: 0 /100 WBC
NRBC BLD-RTO: 0 /100 WBC
OVALOCYTES BLD QL SMEAR: ABNORMAL
PLATELET # BLD AUTO: 212 K/UL (ref 150–450)
PLATELET # BLD AUTO: 216 K/UL (ref 150–450)
PLATELET BLD QL SMEAR: ABNORMAL
PMV BLD AUTO: 8.3 FL (ref 9.2–12.9)
PMV BLD AUTO: 8.6 FL (ref 9.2–12.9)
POIKILOCYTOSIS BLD QL SMEAR: SLIGHT
POLYCHROMASIA BLD QL SMEAR: ABNORMAL
POTASSIUM SERPL-SCNC: 4.1 MMOL/L (ref 3.5–5.1)
PROT SERPL-MCNC: 6 G/DL (ref 6–8.4)
PROTHROMBIN TIME: 13.4 SEC (ref 9–12.5)
RBC # BLD AUTO: 3.56 M/UL (ref 4–5.4)
RBC # BLD AUTO: 3.9 M/UL (ref 4–5.4)
SCHISTOCYTES BLD QL SMEAR: ABNORMAL
SCHISTOCYTES BLD QL SMEAR: PRESENT
SODIUM SERPL-SCNC: 136 MMOL/L (ref 136–145)
SPHEROCYTES BLD QL SMEAR: ABNORMAL
TARGETS BLD QL SMEAR: ABNORMAL
TOXIC GRANULES BLD QL SMEAR: PRESENT
WBC # BLD AUTO: 5.85 K/UL (ref 3.9–12.7)
WBC # BLD AUTO: 6.56 K/UL (ref 3.9–12.7)

## 2023-12-06 PROCEDURE — 80053 COMPREHEN METABOLIC PANEL: CPT | Performed by: STUDENT IN AN ORGANIZED HEALTH CARE EDUCATION/TRAINING PROGRAM

## 2023-12-06 PROCEDURE — 25000003 PHARM REV CODE 250: Performed by: STUDENT IN AN ORGANIZED HEALTH CARE EDUCATION/TRAINING PROGRAM

## 2023-12-06 PROCEDURE — 85610 PROTHROMBIN TIME: CPT | Performed by: STUDENT IN AN ORGANIZED HEALTH CARE EDUCATION/TRAINING PROGRAM

## 2023-12-06 PROCEDURE — 90791 PSYCH DIAGNOSTIC EVALUATION: CPT | Mod: GC,,, | Performed by: PSYCHIATRY & NEUROLOGY

## 2023-12-06 PROCEDURE — 90791 PR PSYCHIATRIC DIAGNOSTIC EVALUATION: ICD-10-PCS | Mod: GC,,, | Performed by: PSYCHIATRY & NEUROLOGY

## 2023-12-06 PROCEDURE — 85025 COMPLETE CBC W/AUTO DIFF WBC: CPT | Performed by: STUDENT IN AN ORGANIZED HEALTH CARE EDUCATION/TRAINING PROGRAM

## 2023-12-06 PROCEDURE — 25000003 PHARM REV CODE 250

## 2023-12-06 PROCEDURE — 20600001 HC STEP DOWN PRIVATE ROOM

## 2023-12-06 PROCEDURE — 36415 COLL VENOUS BLD VENIPUNCTURE: CPT | Performed by: STUDENT IN AN ORGANIZED HEALTH CARE EDUCATION/TRAINING PROGRAM

## 2023-12-06 RX ADMIN — ACETAMINOPHEN 650 MG: 325 TABLET ORAL at 08:12

## 2023-12-06 RX ADMIN — APIXABAN 10 MG: 5 TABLET, FILM COATED ORAL at 08:12

## 2023-12-06 RX ADMIN — PANTOPRAZOLE SODIUM 40 MG: 40 TABLET, DELAYED RELEASE ORAL at 08:12

## 2023-12-06 NOTE — SUBJECTIVE & OBJECTIVE
Interval History:  See above      Objective:     Vital Signs (Most Recent):  Temp: 98.6 °F (37 °C) (12/05/23 1937)  Pulse: 99 (12/05/23 1937)  Resp: 18 (12/05/23 1937)  BP: (!) 116/53 (12/05/23 1937)  SpO2: 98 % (12/05/23 1937) Vital Signs (24h Range):  Temp:  [97.7 °F (36.5 °C)-98.6 °F (37 °C)] 98.6 °F (37 °C)  Pulse:  [77-99] 99  Resp:  [17-20] 18  SpO2:  [96 %-99 %] 98 %  BP: (102-132)/(53-74) 116/53     Weight: 79.4 kg (175 lb)  Body mass index is 29.12 kg/m².    Intake/Output Summary (Last 24 hours) at 12/5/2023 2137  Last data filed at 12/5/2023 0418  Gross per 24 hour   Intake --   Output 200 ml   Net -200 ml         Physical Exam  Vitals and nursing note reviewed.   Constitutional:       General: She is not in acute distress.     Appearance: She is well-developed. She is obese.   HENT:      Head: Normocephalic and atraumatic.      Nose: Nose normal.      Mouth/Throat:      Mouth: Mucous membranes are moist.      Pharynx: Oropharynx is clear.   Eyes:      Extraocular Movements: Extraocular movements intact.      Pupils: Pupils are equal, round, and reactive to light.   Cardiovascular:      Rate and Rhythm: Normal rate and regular rhythm.   Pulmonary:      Effort: Pulmonary effort is normal.      Breath sounds: Normal breath sounds.   Abdominal:      General: Abdomen is flat. Bowel sounds are normal. There is no distension.      Palpations: Abdomen is soft.      Tenderness: There is no abdominal tenderness.   Musculoskeletal:         General: No tenderness.   Skin:     General: Skin is warm and dry.   Neurological:      Mental Status: She is alert.      Comments: Oriented to person, place, and year. Reports she doesn't keep up with the date anymore, unable to provide daughter's numbers.    Psychiatric:         Behavior: Behavior normal.             Significant Labs: All pertinent labs within the past 24 hours have been reviewed.    Significant Imaging: I have reviewed all pertinent imaging results/findings  within the past 24 hours.

## 2023-12-06 NOTE — PLAN OF CARE
Pt slept well this shift. A&Ox 3. VSS. Pain controlled with PRN's. Purewick in place. No concerns noted. Pt refused to reposition much. Skin care completed. Safety precautions in place. Call light in reach. No further concerns noted at this time.     Problem: Adult Inpatient Plan of Care  Goal: Plan of Care Review  Outcome: Ongoing, Progressing  Goal: Patient-Specific Goal (Individualized)  Outcome: Ongoing, Progressing  Goal: Absence of Hospital-Acquired Illness or Injury  Outcome: Ongoing, Progressing  Goal: Optimal Comfort and Wellbeing  Outcome: Ongoing, Progressing  Goal: Readiness for Transition of Care  Outcome: Ongoing, Progressing     Problem: Infection  Goal: Absence of Infection Signs and Symptoms  Outcome: Ongoing, Progressing     Problem: Impaired Wound Healing  Goal: Optimal Wound Healing  Outcome: Ongoing, Progressing     Problem: Skin Injury Risk Increased  Goal: Skin Health and Integrity  Outcome: Ongoing, Progressing     Problem: Fall Injury Risk  Goal: Absence of Fall and Fall-Related Injury  Outcome: Ongoing, Progressing

## 2023-12-06 NOTE — SUBJECTIVE & OBJECTIVE
Interval History: see above      Objective:     Vital Signs (Most Recent):  Temp: 97.9 °F (36.6 °C) (12/06/23 1156)  Pulse: 99 (12/06/23 1156)  Resp: 18 (12/06/23 1156)  BP: (!) 101/50 (12/06/23 1156)  SpO2: 98 % (12/06/23 1156) Vital Signs (24h Range):  Temp:  [97.9 °F (36.6 °C)-98.6 °F (37 °C)] 97.9 °F (36.6 °C)  Pulse:  [] 99  Resp:  [16-18] 18  SpO2:  [95 %-98 %] 98 %  BP: ()/(46-60) 101/50     Weight: 79.4 kg (175 lb)  Body mass index is 29.12 kg/m².    Intake/Output Summary (Last 24 hours) at 12/6/2023 1327  Last data filed at 12/6/2023 0537  Gross per 24 hour   Intake 960 ml   Output 550 ml   Net 410 ml         Physical Exam  Vitals and nursing note reviewed.   Constitutional:       General: She is not in acute distress.     Appearance: She is well-developed. She is obese.   HENT:      Head: Normocephalic and atraumatic.      Nose: Nose normal.      Mouth/Throat:      Mouth: Mucous membranes are moist.      Pharynx: Oropharynx is clear.   Eyes:      Extraocular Movements: Extraocular movements intact.      Pupils: Pupils are equal, round, and reactive to light.   Cardiovascular:      Rate and Rhythm: Normal rate and regular rhythm.   Pulmonary:      Effort: Pulmonary effort is normal.      Breath sounds: Normal breath sounds.   Abdominal:      General: Abdomen is flat. Bowel sounds are normal. There is no distension.      Palpations: Abdomen is soft.      Tenderness: There is no abdominal tenderness.   Musculoskeletal:         General: No tenderness.   Skin:     General: Skin is warm and dry.   Neurological:      Mental Status: She is alert.      Comments:     Psychiatric:         Behavior: Behavior normal.             Significant Labs: All pertinent labs within the past 24 hours have been reviewed.    Significant Imaging: I have reviewed all pertinent imaging results/findings within the past 24 hours.

## 2023-12-06 NOTE — PROGRESS NOTES
"[facility] - [facility] ([address] Medicine  Progress Note    Patient Name: Ana Vasquez  MRN: [id number]  Patient Class: IP- Inpatient   Admission Date: 11/27/2023  Length of Stay: 9 days  Attending Physician: Madison Tubbs DO  Primary Care Provider: Jennifer Schwartz MD        Subjective:     Principal Problem:Symptomatic anemia        HPI:  Ana Vasquez is a 83 y.o. female with a hx of DVT and HTN presents to the ED from her NH for shortness of breath with exertion. Patient is a poor historian. Needing frequent redirection during the interview. Patient unable to tell me why she is here. When asked about her needing blood, she states "she always has low blood level." Per ED provider, " She reports someone in her nursing home told her that her blood levels were low but she always has "low blood levels". She denies change in recent medications and thinks she continues to take Eliquis.  She denies noting recent black or tarry stools, gnawing epigastric pain, nausea, vomiting, chest pain arrest, swelling of the extremities, fever, cough.  She denies knowledge of past gastrointestinal bleeding episodes.  She thinks she takes a multivitamin but is unsure if it may contain iron." Patient denies any rectal bleeding, hematuria and hemoptysis on my exam. Denies any further complaints. Endorses improvement of shortness of breath.     ED: AF, tachycardic and hypotensive on admission. Hgb 4.3. K 3.3. . CXR showed no acute process. 2 units pRBCs ordered in ED and transfused. GI consulted. Given IV protonix in ED.     Overview/Hospital Course:  Patient admitted for anemia 2/2 possible GIB as patient on eliquis at home. AC held. Transfused 3 units,  trending H/H. GI consulted. No melena or bleeding noted. UA infectious and started on CTX, Ucx GNR awaiting speciation. Unable to contact family, contact numbers on chart are out of service, contacted NH who have the same numbers on file. Attempting " "to contact family for consent for scope as patient with underlying cognitive decline and unable to consent herself per GI. If unable to contact family, plan for scope outpt. H/H stable and no further bleeding. Risk benefit for restarting eliquis, for prior DVT in 2021. ACRLITOS u/s ordered to eval DVT.     Discussed the case with case management this morning.  Patient is on Eliquis at home, and currently the patient is high-risk for bleeding as she presented with a hemoglobin of only 4.  Therefore we require ethics consult as were not able to obtain a consent for an endoscopy vs.  Stopping her Eliquis. There is worsening of her lower extremity edema.  Plan to start her on furosemide and obtain an echocardiogram.     Had a lengthy discussion this morning with the patient.  Patient reports she has had heavy periods when she was younger.  She feels that she has a tendency to easily bleed at times.  Discussed with patient that she is taking a blood thinner to stop her from forming a clot and it is risky to stop it as she may form another clot.  She is also risky to give it to her as she may bleed.  Patient points at her hands and reported she has had multiple sticks and she is tired of them.  Reports that she had significant pain during childbirth "however I ended up with the baby in my hand which was very rewarding, I am going through this much pain and I do not feel there is a reward at the end.  That has been said open to whatever you suggest at this point.  Would prefer a pill anticoagulant." Discussed with the patient that heparin which is the IV form can be reversed in the case of an emergency and would like to start with that if she is agreeable.  Patient thought about it for a few seconds and then looked at me and then said "I am okay with IV form of anticoagulant".  I asked the patient if she understands the risks of it.. "Yeah yeah..  Causes bleeding." She has awareness of the situation. Most of my conversation with " the patient have been logical. However, I understand the GI fellow's concerns given her mental status therefore we are proceeding with caution regarding anticoagulation and consulting with ethics regarding discharge home with Eliquis vs. endoscopy. Contacted the NH and spoke with her nurse there. I appreciate their assistance. She said that they are planning to reach out to the director and/or  on site to assess the family situation.       DVT lower extremity was discovered on ultrasound.  Starting on treatment dose of Eliquis. Discussed this with GI.  GI not intervening with a any procedure unless the patient can consent or the family can be contacted to consent.  FX has been consulted over the weekend.  Psychiatry has been consulted today, would appreciate if they would evaluate patient's capacity to consent.    Appreciate Psychiatry assistance in evaluating patient.  Awaiting the recommendations.  Patient denies any complaints, was lying down and eating.  Recommended elevating the head of the bed.  Patient understands and elevated that of the bed by herself.  Overall, very pleasant.      Interval History: see above      Objective:     Vital Signs (Most Recent):  Temp: 97.9 °F (36.6 °C) (12/06/23 1156)  Pulse: 99 (12/06/23 1156)  Resp: 18 (12/06/23 1156)  BP: (!) 101/50 (12/06/23 1156)  SpO2: 98 % (12/06/23 1156) Vital Signs (24h Range):  Temp:  [97.9 °F (36.6 °C)-98.6 °F (37 °C)] 97.9 °F (36.6 °C)  Pulse:  [] 99  Resp:  [16-18] 18  SpO2:  [95 %-98 %] 98 %  BP: ()/(46-60) 101/50     Weight: 79.4 kg (175 lb)  Body mass index is 29.12 kg/m².    Intake/Output Summary (Last 24 hours) at 12/6/2023 1327  Last data filed at 12/6/2023 0537  Gross per 24 hour   Intake 960 ml   Output 550 ml   Net 410 ml         Physical Exam  Vitals and nursing note reviewed.   Constitutional:       General: She is not in acute distress.     Appearance: She is well-developed. She is obese.   HENT:      Head:  Normocephalic and atraumatic.      Nose: Nose normal.      Mouth/Throat:      Mouth: Mucous membranes are moist.      Pharynx: Oropharynx is clear.   Eyes:      Extraocular Movements: Extraocular movements intact.      Pupils: Pupils are equal, round, and reactive to light.   Cardiovascular:      Rate and Rhythm: Normal rate and regular rhythm.   Pulmonary:      Effort: Pulmonary effort is normal.      Breath sounds: Normal breath sounds.   Abdominal:      General: Abdomen is flat. Bowel sounds are normal. There is no distension.      Palpations: Abdomen is soft.      Tenderness: There is no abdominal tenderness.   Musculoskeletal:         General: No tenderness.   Skin:     General: Skin is warm and dry.   Neurological:      Mental Status: She is alert.      Comments:     Psychiatric:         Behavior: Behavior normal.             Significant Labs: All pertinent labs within the past 24 hours have been reviewed.    Significant Imaging: I have reviewed all pertinent imaging results/findings within the past 24 hours.    Assessment/Plan:      * Symptomatic anemia  Acute blood loss anemia  Patient's anemia is currently controlled. Has received 3 units of PRBCs on 11/27 . Etiology likely d/t acute blood loss which was from concern for GIB  Current CBC reviewed-   Lab Results   Component Value Date    HGB 8.5 (L) 11/29/2023    HCT 27.4 (L) 11/29/2023     Monitor serial CBC and transfuse if patient becomes hemodynamically unstable, symptomatic or H/H drops below 7/21.  - last known Hgb was ~11 in 2021, pt has Eliquis on med list but she cannot say if she is taking it  - guaiac positive  - Hgb 4.6>> receiving 2 units in ED, another unit on floor  - anemia panel neg  - CBC q8  - transfuse Hgb <7  - GI consulted, appreciate assistance   - low threshold for ICU care if becomes hemodynamically unstable    - GI recommend scope but given patient unable to consent herself and unable to contact family elected for outpt scope   -  "cont PPI   - will cont to try and get family contact, emergency contact numbers on file are nonworking. Unfortunately no family has come by and NH has the same nonworking numbers on file. However patient reports she keeps numbers in her purse at the NH so will re attempt to contact NH to get family contact     Acute deep vein thrombosis (DVT) of femoral vein of right lower extremity  -- Starting Eliquis 10 mg for 7 days then 5 mg.   -- Discussed with patient, she replied "I understand that there is a high risk of bleeding."      Lower extremity edema  -- start with furosemide 40 mg b.i.d. p.o..  -- strict I&O  -- echocardiogram, as none on file.  -- US DVT LE for history of iliac vein stenosis s/p stenting which then thrmobiss again. Been holding off AC, GI recommendations not to do endoscopy as the bleeding was stabilized with blood transfusion. Therefore, it was deemed by GI as non emergent and outpatient endoscopy was recommended. I felt uncomfrtable with the dc, especially restarting eliquis. Reached out to GI again and case management. Another attempt to get a hold family was still unsuccessful. It was determined that it is best to consult ethics on this case.       Acute cystitis  UA infectious, foul smelling  - Ucx GNR  - CTX pending cultures  -- sensitive to Macrobid, patient refused an IV medicine at this point.  It is okay to switch to Macrobid    Hypokalemia  Patient has hypokalemia which is Acute and currently controlled. Most recent potassium levels reviewed-   Lab Results   Component Value Date    K 3.0 (L) 11/29/2023   . Will continue potassium replacement per protocol and recheck repeat levels after replacement completed.     Lower GI bleed  - FOBT positive   - Hgb 4 on admit, baseline around ~11 2 years ago  - continue IV protonix infusion  - GI consulted, appreciate assistance  - Type and screen, blood consent obtained  - CBCq 8h.  Transfuse for Hemoglobin <7  - Coagulopathy goals:  Plt >50, INR " <1.5  - GI recommend scope, but unable to contact family for consent, given this recommend outpt scope at later time   - H/H stable post transfusion and no blood in BM noted by nursing, however H/H slowly decreasing   - cont PO PPI  - refer to GI outpt for scope in near future once family able to be contacted    Acute blood loss anemia  Patient's anemia is currently controlled. Has received 3 units of PRBCs on 11/27 . Etiology likely d/t acute blood loss concern for GIB  Current CBC reviewed-   Lab Results   Component Value Date    HGB 8.3 (L) 11/30/2023    HCT 27.4 (L) 11/30/2023     Monitor serial CBC and transfuse if patient becomes hemodynamically unstable, symptomatic or H/H drops below 7/21.    Trending down slowly. Maroon colored stool. Reaching out to GI     Chronic deep vein thrombosis (DVT) of proximal vein of right lower extremity  - hx noted  - holding Eliquis in setting of acute blood loss  - will restart when clinically indicated  - will place sequential compression devices     - LE u/s ordered to reeval clot to assist in risk benefit of restarting eliquis      Essential hypertension  - hx noted, hypotensive on admission  - monitor volume status/ hemodynamics closely with acute blood loss  - BP improved, monitor and adjust regimen as needed      VTE Risk Mitigation (From admission, onward)           Ordered     apixaban tablet 10 mg  2 times daily         12/04/23 1807     Place sequential compression device  Until discontinued         11/27/23 1608     IP VTE HIGH RISK PATIENT  Once         11/27/23 1412     Reason for No Pharmacological VTE Prophylaxis  Once        Question:  Reasons:  Answer:  Active Bleeding    11/27/23 1412                    Discharge Planning   BUBBA: 12/7/2023     Code Status: Full Code   Is the patient medically ready for discharge?: No    Reason for patient still in hospital (select all that apply): Patient trending condition, Treatment, and Consult recommendations  Discharge  Plan A: Return to nursing home ([facility] Phone: [phone])   Discharge Delays: None known at this time              Madison Tubbs DO  Department of Hospital Medicine   [facility] - Intensive Care ([place])

## 2023-12-06 NOTE — PROGRESS NOTES
"[facility] - [facility] ([address] Medicine  Progress Note    Patient Name: Ana Vasquez  MRN: [id number]  Patient Class: IP- Inpatient   Admission Date: 11/27/2023  Length of Stay: 8 days  Attending Physician: Madison Tubbs DO  Primary Care Provider: Jennifer Schwartz MD        Subjective:     Principal Problem:Symptomatic anemia        HPI:  Ana Vasquez is a 83 y.o. female with a hx of DVT and HTN presents to the ED from her NH for shortness of breath with exertion. Patient is a poor historian. Needing frequent redirection during the interview. Patient unable to tell me why she is here. When asked about her needing blood, she states "she always has low blood level." Per ED provider, " She reports someone in her nursing home told her that her blood levels were low but she always has "low blood levels". She denies change in recent medications and thinks she continues to take Eliquis.  She denies noting recent black or tarry stools, gnawing epigastric pain, nausea, vomiting, chest pain arrest, swelling of the extremities, fever, cough.  She denies knowledge of past gastrointestinal bleeding episodes.  She thinks she takes a multivitamin but is unsure if it may contain iron." Patient denies any rectal bleeding, hematuria and hemoptysis on my exam. Denies any further complaints. Endorses improvement of shortness of breath.     ED: AF, tachycardic and hypotensive on admission. Hgb 4.3. K 3.3. . CXR showed no acute process. 2 units pRBCs ordered in ED and transfused. GI consulted. Given IV protonix in ED.     Overview/Hospital Course:  Patient admitted for anemia 2/2 possible GIB as patient on eliquis at home. AC held. Transfused 3 units,  trending H/H. GI consulted. No melena or bleeding noted. UA infectious and started on CTX, Ucx GNR awaiting speciation. Unable to contact family, contact numbers on chart are out of service, contacted NH who have the same numbers on file. Attempting " "to contact family for consent for scope as patient with underlying cognitive decline and unable to consent herself per GI. If unable to contact family, plan for scope outpt. H/H stable and no further bleeding. Risk benefit for restarting eliquis, for prior DVT in 2021. CARLITOS u/s ordered to eval DVT.     Discussed the case with case management this morning.  Patient is on Eliquis at home, and currently the patient is high-risk for bleeding as she presented with a hemoglobin of only 4.  Therefore we require ethics consult as were not able to obtain a consent for an endoscopy vs.  Stopping her Eliquis. There is worsening of her lower extremity edema.  Plan to start her on furosemide and obtain an echocardiogram.     Had a lengthy discussion this morning with the patient.  Patient reports she has had heavy periods when she was younger.  She feels that she has a tendency to easily bleed at times.  Discussed with patient that she is taking a blood thinner to stop her from forming a clot and it is risky to stop it as she may form another clot.  She is also risky to give it to her as she may bleed.  Patient points at her hands and reported she has had multiple sticks and she is tired of them.  Reports that she had significant pain during childbirth "however I ended up with the baby in my hand which was very rewarding, I am going through this much pain and I do not feel there is a reward at the end.  That has been said open to whatever you suggest at this point.  Would prefer a pill anticoagulant." Discussed with the patient that heparin which is the IV form can be reversed in the case of an emergency and would like to start with that if she is agreeable.  Patient thought about it for a few seconds and then looked at me and then said "I am okay with IV form of anticoagulant".  I asked the patient if she understands the risks of it.. "Yeah yeah..  Causes bleeding." She has awareness of the situation. Most of my conversation with " the patient have been logical. However, I understand the GI fellow's concerns given her mental status therefore we are proceeding with caution regarding anticoagulation and consulting with ethics regarding discharge home with Eliquis vs. endoscopy. Contacted the NH and spoke with her nurse there. I appreciate their assistance. She said that they are planning to reach out to the director and/or  on site to assess the family situation.       DVT lower extremity was discovered on ultrasound.  Starting on treatment dose of Eliquis. Discussed this with GI.  GI not intervening with a any procedure unless the patient can consent or the family can be contacted to consent.  FX has been consulted over the weekend.  Psychiatry has been consulted today, would appreciate if they would evaluate patient's capacity to consent.    Interval History:  See above      Objective:     Vital Signs (Most Recent):  Temp: 98.6 °F (37 °C) (12/05/23 1937)  Pulse: 99 (12/05/23 1937)  Resp: 18 (12/05/23 1937)  BP: (!) 116/53 (12/05/23 1937)  SpO2: 98 % (12/05/23 1937) Vital Signs (24h Range):  Temp:  [97.7 °F (36.5 °C)-98.6 °F (37 °C)] 98.6 °F (37 °C)  Pulse:  [77-99] 99  Resp:  [17-20] 18  SpO2:  [96 %-99 %] 98 %  BP: (102-132)/(53-74) 116/53     Weight: 79.4 kg (175 lb)  Body mass index is 29.12 kg/m².    Intake/Output Summary (Last 24 hours) at 12/5/2023 2137  Last data filed at 12/5/2023 0418  Gross per 24 hour   Intake --   Output 200 ml   Net -200 ml         Physical Exam  Vitals and nursing note reviewed.   Constitutional:       General: She is not in acute distress.     Appearance: She is well-developed. She is obese.   HENT:      Head: Normocephalic and atraumatic.      Nose: Nose normal.      Mouth/Throat:      Mouth: Mucous membranes are moist.      Pharynx: Oropharynx is clear.   Eyes:      Extraocular Movements: Extraocular movements intact.      Pupils: Pupils are equal, round, and reactive to light.   Cardiovascular:       Rate and Rhythm: Normal rate and regular rhythm.   Pulmonary:      Effort: Pulmonary effort is normal.      Breath sounds: Normal breath sounds.   Abdominal:      General: Abdomen is flat. Bowel sounds are normal. There is no distension.      Palpations: Abdomen is soft.      Tenderness: There is no abdominal tenderness.   Musculoskeletal:         General: No tenderness.   Skin:     General: Skin is warm and dry.   Neurological:      Mental Status: She is alert.      Comments: Oriented to person, place, and year. Reports she doesn't keep up with the date anymore, unable to provide daughter's numbers.    Psychiatric:         Behavior: Behavior normal.             Significant Labs: All pertinent labs within the past 24 hours have been reviewed.    Significant Imaging: I have reviewed all pertinent imaging results/findings within the past 24 hours.    Assessment/Plan:      * Symptomatic anemia  Acute blood loss anemia  Patient's anemia is currently controlled. Has received 3 units of PRBCs on 11/27 . Etiology likely d/t acute blood loss which was from concern for GIB  Current CBC reviewed-   Lab Results   Component Value Date    HGB 8.5 (L) 11/29/2023    HCT 27.4 (L) 11/29/2023     Monitor serial CBC and transfuse if patient becomes hemodynamically unstable, symptomatic or H/H drops below 7/21.  - last known Hgb was ~11 in 2021, pt has Eliquis on med list but she cannot say if she is taking it  - guaiac positive  - Hgb 4.6>> receiving 2 units in ED, another unit on floor  - anemia panel neg  - CBC q8  - transfuse Hgb <7  - GI consulted, appreciate assistance   - low threshold for ICU care if becomes hemodynamically unstable    - GI recommend scope but given patient unable to consent herself and unable to contact family elected for outpt scope   - cont PPI   - will cont to try and get family contact, emergency contact numbers on file are nonworking. Unfortunately no family has come by and NH has the same nonworking  "numbers on file. However patient reports she keeps numbers in her purse at the NH so will re attempt to contact NH to get family contact     Acute deep vein thrombosis (DVT) of femoral vein of right lower extremity  -- Starting Eliquis 10 mg for 7 days then 5 mg.   -- Discussed with patient, she replied "I understand that there is a high risk of bleeding."      Lower extremity edema  -- start with furosemide 40 mg b.i.d. p.o..  -- strict I&O  -- echocardiogram, as none on file.  -- US DVT LE for history of iliac vein stenosis s/p stenting which then thrmobiss again. Been holding off AC, GI recommendations not to do endoscopy as the bleeding was stabilized with blood transfusion. Therefore, it was deemed by GI as non emergent and outpatient endoscopy was recommended. I felt uncomfrtable with the dc, especially restarting eliquis. Reached out to GI again and case management. Another attempt to get a hold family was still unsuccessful. It was determined that it is best to consult ethics on this case.       Acute cystitis  UA infectious, foul smelling  - Ucx GNR  - CTX pending cultures  -- sensitive to Macrobid, patient refused an IV medicine at this point.  It is okay to switch to Macrobid    Hypokalemia  Patient has hypokalemia which is Acute and currently controlled. Most recent potassium levels reviewed-   Lab Results   Component Value Date    K 3.0 (L) 11/29/2023   . Will continue potassium replacement per protocol and recheck repeat levels after replacement completed.     Lower GI bleed  - FOBT positive   - Hgb 4 on admit, baseline around ~11 2 years ago  - continue IV protonix infusion  - GI consulted, appreciate assistance  - Type and screen, blood consent obtained  - CBCq 8h.  Transfuse for Hemoglobin <7  - Coagulopathy goals:  Plt >50, INR <1.5  - GI recommend scope, but unable to contact family for consent, given this recommend outpt scope at later time   - H/H stable post transfusion and no blood in BM noted " by nursing, however H/H slowly decreasing   - cont PO PPI  - refer to GI outpt for scope in near future once family able to be contacted    Acute blood loss anemia  Patient's anemia is currently controlled. Has received 3 units of PRBCs on 11/27 . Etiology likely d/t acute blood loss concern for GIB  Current CBC reviewed-   Lab Results   Component Value Date    HGB 8.3 (L) 11/30/2023    HCT 27.4 (L) 11/30/2023     Monitor serial CBC and transfuse if patient becomes hemodynamically unstable, symptomatic or H/H drops below 7/21.    Trending down slowly. Maroon colored stool. Reaching out to GI     Chronic deep vein thrombosis (DVT) of proximal vein of right lower extremity  - hx noted  - holding Eliquis in setting of acute blood loss  - will restart when clinically indicated  - will place sequential compression devices     - LE u/s ordered to reeval clot to assist in risk benefit of restarting eliquis      Essential hypertension  - hx noted, hypotensive on admission  - monitor volume status/ hemodynamics closely with acute blood loss  - BP improved, monitor and adjust regimen as needed      VTE Risk Mitigation (From admission, onward)           Ordered     apixaban tablet 10 mg  2 times daily         12/04/23 1807     Place sequential compression device  Until discontinued         11/27/23 1608     IP VTE HIGH RISK PATIENT  Once         11/27/23 1412     Reason for No Pharmacological VTE Prophylaxis  Once        Question:  Reasons:  Answer:  Active Bleeding    11/27/23 1412                    Discharge Planning   BUBBA: 12/6/2023     Code Status: Full Code   Is the patient medically ready for discharge?: No    Reason for patient still in hospital (select all that apply): Patient trending condition and Treatment  Discharge Plan A: Return to long-term ([facility] Phone: [phone])   Discharge Delays: None known at this time              Madison Tubbs DO  Department of Hospital Medicine   [facility]  - Intensive Care ([facility]

## 2023-12-06 NOTE — CONSULTS
"CONSULTATION LIAISON PSYCHIATRY INITIAL EVALUATION    Patient Name: Ana Vasquez  MRN: [id number]  Patient Class: IP- Inpatient  Admission Date: 11/27/2023  Attending Physician: Madison Tubbs DO      HPI:   Ana Vasquez is a 83 y.o. female with no clear past psychiatric history per chart & past pertinent medical history of DVT and HTN presents to the ED/admitted to the hospital for Symptomatic anemia    Per primary team   Patient admitted for anemia 2/2 possible GIB as patient on eliquis at home. AC held. Transfused 3 units,  trending H/H. GI consulted. No melena or bleeding noted. UA infectious and started on CTX, Ucx GNR awaiting speciation. Unable to contact family, contact numbers on chart are out of service, contacted NH who have the same numbers on file. Attempting to contact family for consent for scope as patient with underlying cognitive decline and unable to consent herself per GI. If unable to contact family, plan for scope outpt. H/H stable and no further bleeding. Risk benefit for restarting eliquis, for prior DVT in 2021. LE u/s ordered to eval DVT.      Psychiatry consulted for: Capacity to consent for endoscopy    Prior to interview patient resting comfortably in bed. Upon introducing myself patient is amenable to interview at this time. Patient is oriented to person, place, and time (stating that the month was December and the year was 2023), and reports coming into the hospital for "issues with my blood."     During interview patient overall linear and goal-directed with moments of minor disorientation. Does state that prior to admission she was living with sister which does conflict with information in chart indicating that patient arrived from a nursing home.     When discussed possible endoscopy patient endorsed willingness to undergo procedure. Patient able to rationally manipulate information in meaningful way. Patient able consistently reiterate decision to consent as well as the " potential consequences of not undergoing the procedure.        Collateral with patient's permission:   Collateral not obtained at this time.     Medical Review of Systems:  Pertinent items are noted in HPI.    Psychiatric Review of Systems (is patient experiencing or having changes in):  sleep: no  appetite: no  weight: no  energy/anergy: no  interest/pleasure/anhedonia: no  somatic symptoms: no  libido: no  anxiety/panic: no  guilty/hopelessness: no  concentration: no  Sarita:no  Psychosis: no  Trauma: no  S.I.B.s/risky behavior: no    Past Psychiatric History:  Previous Medication Trials: no  Previous Psychiatric Hospitalizations:no   Previous Suicide Attempts: no  History of Violence: no  Outpatient Psychiatrist: no  Family Psychiatric History: no    Substance Abuse History (with emphasis over the last 12 months):  Recreational Drugs:  denies  Use of Alcohol: denied  Tobacco Use:Former smoker  Rehab History:no    Social History:  Marital Status:   Children: 4  Employment Status/Info: retired, previously worked as    :no  Education: 9th grade  Special Ed: no  Housing Status: nursing home  Access to gun: no  Psychosocial Stressors: health  Functioning Relationships: good support system    Legal History:  Past Charges/Incarcerations: no  Pending charges:no    Mental Status Exam:  General Appearance: appears stated age, well developed and nourished, adequately groomed and appropriately dressed, in no acute distress  Behavior: normal; cooperative; reasonably friendly, pleasant, and polite; appropriate eye-contact; under good behavioral control  Involuntary Movements and Motor Activity: no abnormal involuntary movements noted; no tics, no tremors, no akathisia, no dystonia, no evidence of tardive dyskinesia; no psychomotor agitation or retardation  Gait and Station: unable to assess - patient lying down or seated  Speech and Language: intact; normal rate, rhythm, volume, tone, and pitch;  "conversational, spontaneous, and coherent; speaks and understands English proficiently and fluently; repeats words and phrases, no word finding difficulties are noted  Mood: "Fine"  Affect: normal, euthymic, reactive, full-range, mood-congruent, appropriate to situation and context  Thought Process and Associations: intact; linear, goal-directed, organized, and logical; no loosening of associations noted  Thought Content and Perceptions:: no suicidal or homicidal ideation, no auditory or visual hallucinations, no paranoid ideation, no ideas of reference, no evidence of delusions or psychosis  Sensorium and Orientation:  Patient alert and oriented to person, place, and time.   Recent and Remote Memory: forgetful, registers 3/3 objects, recalls 2/3 objects at 5 minutes  Attention and Concentration: attentive to conversation  Fund of Knowledge: grossly intact  Insight: intact  Judgment: intact        ASSESSMENT & RECOMMENDATIONS   Adjustment disorder with mixed features        Capacity Evaluation      CAM-ICU:  Acute change and/or fluctuating course of mental status: No  Inattention (SAVEAHAART): No  "Squeeze my hand, only when you hear, the letter 'A'."  Altered Level of Consciousness: No  Disorganized Thinking (Errors >1/6): No  "Will a stone float on water?"  "Are there fish in the sea?"  "Does one pound weigh more than two?"  "Can you use a hammer to pound a nail?"  Command(s):  "Hold up 2 fingers."  "Now do the same thing with the other hand."    Negative CAM-ICU      Capacity question:  Does the patient possess the ability to make an informed decision, regarding the proposed treatment/option for her care?    Capacity for a given decision requires:  Understanding - the ability to state the meaning of the relevant information (eg, diagnosis, risks and benefits of a treatment or procedure, indications, and options of care).  The patient must be able to understand the proposed treatment, and/or options for her " care.  Appreciation - the ability to explain how information (eg, diagnosis, benefit, and risk) applies to oneself.  The patient must be able to appreciate her own medical situation, and abstract, as to how the treatments/proposed options for care, apply to her medical situation.  Reasoning - the ability to compare information and infer consequences of choice.  The patient must be able to understand the consequences of her medical decision, in a reasonable manner, supported by the facts, and her own values, in a consistent fashion.  Expressing a choice - the ability to state a decision.  The patient must demonstrate the ability to communicate a choice, clearly and consistently.    Assessment of capacity:  The patient understands the clinical condition relation to this evaluation: Yes.  The patient understands the indication and nature of/reasoning behind the proposed treatment(s) and/or options for her care: Yes.  The patient understands and appreciates the risks and benefits of the proposed treatment(s) and/or options for her care: Yes.  The patient understands and appreciates the risks and benefits of refusing the proposed treatment(s) and/or options for her care: Yes.  The patient is in in agreement with the assessment and she consents to treatment.  The patient has evidence of impaired insight and/or judgment: No.    Recommendations     Based upon my evaluation of Ana Vasquez regarding her medical decision-making capacity for consenting for endoscopy. I found with reasonable certainty that Ana Vasquez does have capacity to make medical decisions on her behalf.    Case discussed with: Dr. Fuller      RISK ASSESSMENT    NO NEED FOR PEC patient NOT in any imminent danger of hurting self or others and not gravely disabled.     FOLLOW UP    Will sign off. Please re-consult if you have additional questions or need arises.     DISPOSITION - once medically cleared:   Defer to medical team    Please contact  ON CALL psychiatry service (24/7) for any acute issues that may arise.    Dr. Jose GAITAN Psychiatry, PGY-II  [facility]  12/6/2023 9:59 AM        --------------------------------------------------------------------------------------------------------------------------------------------------------------------------------------------------------------------------------------    CONTINUED HISTORY & OBJECTIVE clinical data & findings reviewed and noted for above decision making    Past Medical/Surgical History:   Past Medical History:   Diagnosis Date    Anticoagulant long-term use      Past Surgical History:   Procedure Laterality Date    COLONOSCOPY N/A 11/22/2021    Procedure: COLONOSCOPY;  Surgeon: Charbel Crooks MD;  Location: [address];  Service: Endoscopy;  Laterality: N/A;    HYSTERECTOMY         Current Medications:   Scheduled Meds:    apixaban  10 mg Oral BID    pantoprazole  40 mg Oral Daily     PRN Meds: acetaminophen, bisacodyL, dextrose 10%, dextrose 10%, glucagon (human recombinant), glucose, glucose, melatonin, nicotine, ondansetron, polyethylene glycol, promethazine, sodium chloride 0.9%    Allergies:   Review of patient's allergies indicates:  No Known Allergies    Vitals  Vitals:    12/06/23 0857   BP: (!) 94/51   Pulse:    Resp:    Temp:        Labs/Imaging/Studies:  Recent Results (from the past 24 hour(s))   CBC Auto Differential    Collection Time: 12/05/23  6:20 PM   Result Value Ref Range    WBC 6.17 3.90 - 12.70 K/uL    RBC 3.96 (L) 4.00 - 5.40 M/uL    Hemoglobin 9.3 (L) 12.0 - 16.0 g/dL    Hematocrit 31.7 (L) 37.0 - 48.5 %    MCV 80 (L) 82 - 98 fL    MCH 23.5 (L) 27.0 - 31.0 pg    MCHC 29.3 (L) 32.0 - 36.0 g/dL    RDW SEE COMMENT 11.5 - 14.5 %    Platelets 195 150 - 450 K/uL    MPV 9.1 (L) 9.2 - 12.9 fL    Immature Granulocytes 0.2 0.0 - 0.5 %    Gran # (ANC) 4.1 1.8 - 7.7 K/uL    Immature Grans (Abs) 0.01 0.00 - 0.04 K/uL    Lymph # 1.4 1.0 - 4.8  K/uL    Mono # 0.4 0.3 - 1.0 K/uL    Eos # 0.2 0.0 - 0.5 K/uL    Baso # 0.06 0.00 - 0.20 K/uL    nRBC 0 0 /100 WBC    Gran % 66.1 38.0 - 73.0 %    Lymph % 23.0 18.0 - 48.0 %    Mono % 6.6 4.0 - 15.0 %    Eosinophil % 3.1 0.0 - 8.0 %    Basophil % 1.0 0.0 - 1.9 %    Platelet Estimate Appears normal     Aniso Moderate     Poik Slight     Poly Occasional     Hypo Occasional     Ovalocytes Occasional     Target Cells Occasional     Tong Cells Occasional     Spherocytes Occasional     Fragmented Cells Occasional     Differential Method Automated    Protime-INR    Collection Time: 12/06/23  8:43 AM   Result Value Ref Range    Prothrombin Time 13.4 (H) 9.0 - 12.5 sec    INR 1.3 (H) 0.8 - 1.2   Comprehensive Metabolic Panel    Collection Time: 12/06/23  8:43 AM   Result Value Ref Range    Sodium 136 136 - 145 mmol/L    Potassium 4.1 3.5 - 5.1 mmol/L    Chloride 102 95 - 110 mmol/L    CO2 26 23 - 29 mmol/L    Glucose 79 70 - 110 mg/dL    BUN 15 8 - 23 mg/dL    Creatinine 0.9 0.5 - 1.4 mg/dL    Calcium 7.7 (L) 8.7 - 10.5 mg/dL    Total Protein 6.0 6.0 - 8.4 g/dL    Albumin 1.6 (L) 3.5 - 5.2 g/dL    Total Bilirubin 0.5 0.1 - 1.0 mg/dL    Alkaline Phosphatase 94 55 - 135 U/L    AST 20 10 - 40 U/L    ALT 12 10 - 44 U/L    eGFR >60.0 >60 mL/min/1.73 m^2    Anion Gap 8 8 - 16 mmol/L   CBC Auto Differential    Collection Time: 12/06/23  8:43 AM   Result Value Ref Range    WBC 5.85 3.90 - 12.70 K/uL    RBC 3.90 (L) 4.00 - 5.40 M/uL    Hemoglobin 9.4 (L) 12.0 - 16.0 g/dL    Hematocrit 30.8 (L) 37.0 - 48.5 %    MCV 79 (L) 82 - 98 fL    MCH 24.1 (L) 27.0 - 31.0 pg    MCHC 30.5 (L) 32.0 - 36.0 g/dL    RDW SEE COMMENT 11.5 - 14.5 %    Platelets 212 150 - 450 K/uL    MPV 8.6 (L) 9.2 - 12.9 fL    Immature Granulocytes 0.3 0.0 - 0.5 %    Gran # (ANC) 3.1 1.8 - 7.7 K/uL    Immature Grans (Abs) 0.02 0.00 - 0.04 K/uL    Lymph # 1.9 1.0 - 4.8 K/uL    Mono # 0.6 0.3 - 1.0 K/uL    Eos # 0.2 0.0 - 0.5 K/uL    Baso # 0.06 0.00 - 0.20 K/uL    nRBC 0  0 /100 WBC    Gran % 52.6 38.0 - 73.0 %    Lymph % 32.3 18.0 - 48.0 %    Mono % 9.9 4.0 - 15.0 %    Eosinophil % 3.9 0.0 - 8.0 %    Basophil % 1.0 0.0 - 1.9 %    Differential Method Automated      Imaging Results              X-Ray Chest AP Portable (Final result)  Result time 11/27/23 12:30:55      Final result by Juwan Pickett MD (11/27/23 12:30:55)                   Impression:      See above      Electronically signed by: Juwan Pickett MD  Date:    11/27/2023  Time:    12:30               Narrative:    EXAMINATION:  XR CHEST AP PORTABLE    CLINICAL HISTORY:  Shortness of breath    TECHNIQUE:  Single frontal view of the chest was performed.    COMPARISON:  N 11/22/2021 one    FINDINGS:  Heart size normal.  The lungs are clear.  No pleural effusion

## 2023-12-07 LAB
ALBUMIN SERPL BCP-MCNC: 1.4 G/DL (ref 3.5–5.2)
ALP SERPL-CCNC: 84 U/L (ref 55–135)
ALT SERPL W/O P-5'-P-CCNC: 10 U/L (ref 10–44)
ANION GAP SERPL CALC-SCNC: 7 MMOL/L (ref 8–16)
ANISOCYTOSIS BLD QL SMEAR: ABNORMAL
AST SERPL-CCNC: 17 U/L (ref 10–40)
BASOPHILS # BLD AUTO: 0.06 K/UL (ref 0–0.2)
BASOPHILS # BLD AUTO: 0.07 K/UL (ref 0–0.2)
BASOPHILS NFR BLD: 1.1 % (ref 0–1.9)
BASOPHILS NFR BLD: 1.2 % (ref 0–1.9)
BILIRUB SERPL-MCNC: 0.3 MG/DL (ref 0.1–1)
BUN SERPL-MCNC: 18 MG/DL (ref 8–23)
BURR CELLS BLD QL SMEAR: ABNORMAL
CALCIUM SERPL-MCNC: 7.3 MG/DL (ref 8.7–10.5)
CHLORIDE SERPL-SCNC: 105 MMOL/L (ref 95–110)
CO2 SERPL-SCNC: 26 MMOL/L (ref 23–29)
CREAT SERPL-MCNC: 1 MG/DL (ref 0.5–1.4)
DIFFERENTIAL METHOD: ABNORMAL
DIFFERENTIAL METHOD: ABNORMAL
EOSINOPHIL # BLD AUTO: 0.2 K/UL (ref 0–0.5)
EOSINOPHIL # BLD AUTO: 0.3 K/UL (ref 0–0.5)
EOSINOPHIL NFR BLD: 4.2 % (ref 0–8)
EOSINOPHIL NFR BLD: 5 % (ref 0–8)
ERYTHROCYTE [DISTWIDTH] IN BLOOD BY AUTOMATED COUNT: ABNORMAL % (ref 11.5–14.5)
ERYTHROCYTE [DISTWIDTH] IN BLOOD BY AUTOMATED COUNT: ABNORMAL % (ref 11.5–14.5)
EST. GFR  (NO RACE VARIABLE): 55.9 ML/MIN/1.73 M^2
GLUCOSE SERPL-MCNC: 106 MG/DL (ref 70–110)
HCT VFR BLD AUTO: 28.4 % (ref 37–48.5)
HCT VFR BLD AUTO: 30.3 % (ref 37–48.5)
HGB BLD-MCNC: 8.4 G/DL (ref 12–16)
HGB BLD-MCNC: 8.9 G/DL (ref 12–16)
HYPOCHROMIA BLD QL SMEAR: ABNORMAL
IMM GRANULOCYTES # BLD AUTO: 0.02 K/UL (ref 0–0.04)
IMM GRANULOCYTES # BLD AUTO: 0.02 K/UL (ref 0–0.04)
IMM GRANULOCYTES NFR BLD AUTO: 0.4 % (ref 0–0.5)
IMM GRANULOCYTES NFR BLD AUTO: 0.4 % (ref 0–0.5)
INR PPP: 1.3 (ref 0.8–1.2)
LYMPHOCYTES # BLD AUTO: 1.4 K/UL (ref 1–4.8)
LYMPHOCYTES # BLD AUTO: 1.8 K/UL (ref 1–4.8)
LYMPHOCYTES NFR BLD: 27.2 % (ref 18–48)
LYMPHOCYTES NFR BLD: 31.2 % (ref 18–48)
MCH RBC QN AUTO: 24.3 PG (ref 27–31)
MCH RBC QN AUTO: 24.5 PG (ref 27–31)
MCHC RBC AUTO-ENTMCNC: 29.4 G/DL (ref 32–36)
MCHC RBC AUTO-ENTMCNC: 29.6 G/DL (ref 32–36)
MCV RBC AUTO: 83 FL (ref 82–98)
MCV RBC AUTO: 83 FL (ref 82–98)
MONOCYTES # BLD AUTO: 0.4 K/UL (ref 0.3–1)
MONOCYTES # BLD AUTO: 0.5 K/UL (ref 0.3–1)
MONOCYTES NFR BLD: 6.9 % (ref 4–15)
MONOCYTES NFR BLD: 9 % (ref 4–15)
NEUTROPHILS # BLD AUTO: 3 K/UL (ref 1.8–7.7)
NEUTROPHILS # BLD AUTO: 3.2 K/UL (ref 1.8–7.7)
NEUTROPHILS NFR BLD: 56.1 % (ref 38–73)
NEUTROPHILS NFR BLD: 57.3 % (ref 38–73)
NRBC BLD-RTO: 0 /100 WBC
NRBC BLD-RTO: 0 /100 WBC
OVALOCYTES BLD QL SMEAR: ABNORMAL
PLATELET # BLD AUTO: 213 K/UL (ref 150–450)
PLATELET # BLD AUTO: 237 K/UL (ref 150–450)
PLATELET BLD QL SMEAR: ABNORMAL
PMV BLD AUTO: 9.1 FL (ref 9.2–12.9)
PMV BLD AUTO: 9.4 FL (ref 9.2–12.9)
POIKILOCYTOSIS BLD QL SMEAR: SLIGHT
POLYCHROMASIA BLD QL SMEAR: ABNORMAL
POTASSIUM SERPL-SCNC: 4.1 MMOL/L (ref 3.5–5.1)
PROT SERPL-MCNC: 5.1 G/DL (ref 6–8.4)
PROTHROMBIN TIME: 14.2 SEC (ref 9–12.5)
RBC # BLD AUTO: 3.43 M/UL (ref 4–5.4)
RBC # BLD AUTO: 3.67 M/UL (ref 4–5.4)
SCHISTOCYTES BLD QL SMEAR: ABNORMAL
SCHISTOCYTES BLD QL SMEAR: PRESENT
SODIUM SERPL-SCNC: 138 MMOL/L (ref 136–145)
SPHEROCYTES BLD QL SMEAR: ABNORMAL
TARGETS BLD QL SMEAR: ABNORMAL
TOXIC GRANULES BLD QL SMEAR: PRESENT
WBC # BLD AUTO: 5.25 K/UL (ref 3.9–12.7)
WBC # BLD AUTO: 5.68 K/UL (ref 3.9–12.7)

## 2023-12-07 PROCEDURE — 36415 COLL VENOUS BLD VENIPUNCTURE: CPT | Performed by: STUDENT IN AN ORGANIZED HEALTH CARE EDUCATION/TRAINING PROGRAM

## 2023-12-07 PROCEDURE — 85610 PROTHROMBIN TIME: CPT | Performed by: STUDENT IN AN ORGANIZED HEALTH CARE EDUCATION/TRAINING PROGRAM

## 2023-12-07 PROCEDURE — 80053 COMPREHEN METABOLIC PANEL: CPT | Performed by: STUDENT IN AN ORGANIZED HEALTH CARE EDUCATION/TRAINING PROGRAM

## 2023-12-07 PROCEDURE — 85025 COMPLETE CBC W/AUTO DIFF WBC: CPT | Performed by: STUDENT IN AN ORGANIZED HEALTH CARE EDUCATION/TRAINING PROGRAM

## 2023-12-07 PROCEDURE — 20600001 HC STEP DOWN PRIVATE ROOM

## 2023-12-07 PROCEDURE — 25000003 PHARM REV CODE 250: Performed by: STUDENT IN AN ORGANIZED HEALTH CARE EDUCATION/TRAINING PROGRAM

## 2023-12-07 RX ORDER — POLYETHYLENE GLYCOL 3350, SODIUM SULFATE ANHYDROUS, SODIUM BICARBONATE, SODIUM CHLORIDE, POTASSIUM CHLORIDE 236; 22.74; 6.74; 5.86; 2.97 G/4L; G/4L; G/4L; G/4L; G/4L
4000 POWDER, FOR SOLUTION ORAL ONCE
Status: COMPLETED | OUTPATIENT
Start: 2023-12-07 | End: 2023-12-07

## 2023-12-07 RX ADMIN — PANTOPRAZOLE SODIUM 40 MG: 40 TABLET, DELAYED RELEASE ORAL at 09:12

## 2023-12-07 RX ADMIN — APIXABAN 10 MG: 5 TABLET, FILM COATED ORAL at 09:12

## 2023-12-07 RX ADMIN — POLYETHYLENE GLYCOL 3350, SODIUM SULFATE ANHYDROUS, SODIUM BICARBONATE, SODIUM CHLORIDE, POTASSIUM CHLORIDE 4000 ML: 236; 22.74; 6.74; 5.86; 2.97 POWDER, FOR SOLUTION ORAL at 05:12

## 2023-12-07 NOTE — PLAN OF CARE
12/07/23 1120   Discharge Reassessment   Assessment Type Discharge Planning Reassessment   Discharge Plan discussed with: Patient   Discharge Plan A Return to nursing home   DME Needed Upon Discharge  none   Transition of Care Barriers None   Why the patient remains in the hospital Requires continued medical care  (PAtient will be scoped 12/08/23 per AES/GI)   Post-Acute Status   Post-Acute Authorization Placement   Post-Acute Placement Status Set-up Complete/Auth obtained   Coverage MEDICARE - MEDICARE PART A & B -   Discharge Delays (!) Procedure Scheduling (IR, OR, Labs, Echo, Cath, Echo, EEG)  (PAtient to be scoped 12/08/23)     CM met with patient  to discuss any changes in discharge planning.  Patients plan is to  [facility] [phone]   No changes in DC plans. BUBBA:  12/08/23    Patient evaluated per Psych on 12/06/23  Based upon my evaluation of Ana Vasquez regarding her medical decision-making capacity for consenting for endoscopy. I found with reasonable certainty that Ana Vasquez does have capacity to make medical decisions on her behalf.  Patient will be scoped 12/08/23    CM sent updates and BUBBA to [facility]   via [employer].      Patient will need a COVID    Patient colonoscopy will be scheduled as an Outpatient procedure.        Kira Russell RN  Case Management  [facility]  [phone]

## 2023-12-07 NOTE — TREATMENT PLAN
GI Treatment Plan    Ana Vasquez is a 83 y.o. female admitted to hospital 11/27/2023 (Hospital Day: 11) due to Symptomatic anemia.     Interval History  GI previously following for anemia in the setting of hematochezia but endoscopy deferred as she was no longer having any bleeding and did not have capacity to consent. Her eliquis has been restarted and she has not had further bleeding episodes, and her Hgb has remained stable. GI re-contacted to pursue inpatient endoscopy.     Objective  Temp:  [97.9 °F (36.6 °C)-98.8 °F (37.1 °C)] 98.4 °F (36.9 °C) (12/07 0855)  Pulse:  [78-99] 80 (12/07 0855)  BP: (101-114)/(50-60) 113/53 (12/07 0855)  Resp:  [17-18] 18 (12/07 0855)  SpO2:  [95 %-98 %] 95 % (12/07 0855)    General: Alert, Oriented x3, no distress    Laboratory    Recent Labs   Lab 12/05/23  1820 12/06/23  0843 12/06/23 1954   HGB 9.3* 9.4* 8.5*       Lab Results   Component Value Date    WBC 6.56 12/06/2023    HGB 8.5 (L) 12/06/2023    HCT 27.1 (L) 12/06/2023    MCV 76 (L) 12/06/2023     12/06/2023       Lab Results   Component Value Date     12/07/2023    K 4.1 12/07/2023     12/07/2023    CO2 26 12/07/2023    BUN 18 12/07/2023    CREATININE 1.0 12/07/2023    CALCIUM 7.3 (L) 12/07/2023    ANIONGAP 7 (L) 12/07/2023    ESTGFRAFRICA >60.0 11/29/2021    EGFRNONAA >60.0 11/29/2021       Lab Results   Component Value Date    ALT 10 12/07/2023    AST 17 12/07/2023    ALKPHOS 84 12/07/2023    BILITOT 0.3 12/07/2023       Lab Results   Component Value Date    INR 1.3 (H) 12/07/2023    INR 1.3 (H) 12/06/2023    INR 1.3 (H) 12/05/2023   Ms. Ana Vasquez presented with acute anemia in setting of positive FOBT and is being treated for lower GI Bleed likely associated with polyp vs diverticula vs proctitis. PMH significan for <5mm polyp on colonoscopy in 2021. CAMERON showed no melena/hematochezia with positive FOBT. Anticoagulants/antiplatelet treatment held in setting of bleed. NSAID therapy  discontinued/held.Her Eliquis has been re-started on 12/04. No overt signs of GI bleeding noted since admission.     Plan  - Will plan for Colonoscopy 12/08. Clear liquids today then NPO at midnight.   - Will order bowel prep 4 L Golytely to be given tonight.   - Plan of care was discussed with primary team.  - We will continue to follow.    Thank you for involving us in the care of Ana Vasquez. Please call with any additional questions, concerns or changes in the patient's clinical status.    Shira Sommer MD  Gastroenterology

## 2023-12-07 NOTE — PROGRESS NOTES
"[facility] ([address] Medicine  Progress Note    Patient Name: Ana Vasquez  MRN: [id number]  Patient Class: IP- Inpatient   Admission Date: 11/27/2023  Length of Stay: 10 days  Attending Physician: Madison Tubbs DO  Primary Care Provider: Jennifer Schwartz MD        Subjective:     Principal Problem:Symptomatic anemia        HPI:  Ana Vasquez is a 83 y.o. female with a hx of DVT and HTN presents to the ED from her NH for shortness of breath with exertion. Patient is a poor historian. Needing frequent redirection during the interview. Patient unable to tell me why she is here. When asked about her needing blood, she states "she always has low blood level." Per ED provider, " She reports someone in her nursing home told her that her blood levels were low but she always has "low blood levels". She denies change in recent medications and thinks she continues to take Eliquis.  She denies noting recent black or tarry stools, gnawing epigastric pain, nausea, vomiting, chest pain arrest, swelling of the extremities, fever, cough.  She denies knowledge of past gastrointestinal bleeding episodes.  She thinks she takes a multivitamin but is unsure if it may contain iron." Patient denies any rectal bleeding, hematuria and hemoptysis on my exam. Denies any further complaints. Endorses improvement of shortness of breath.     ED: AF, tachycardic and hypotensive on admission. Hgb 4.3. K 3.3. . CXR showed no acute process. 2 units pRBCs ordered in ED and transfused. GI consulted. Given IV protonix in ED.     Overview/Hospital Course:  Patient admitted for anemia 2/2 possible GIB as patient on eliquis at home. AC held. Transfused 3 units,  trending H/H. GI consulted. No melena or bleeding noted. UA infectious and started on CTX, Ucx GNR awaiting speciation. Unable to contact family, contact numbers on chart are out of service, contacted NH who have the same numbers on file. Attempting " "to contact family for consent for scope as patient with underlying cognitive decline and unable to consent herself per GI. If unable to contact family, plan for scope outpt. H/H stable and no further bleeding. Risk benefit for restarting eliquis, for prior DVT in 2021. CARLITOS u/s ordered to eval DVT.     Discussed the case with case management this morning.  Patient is on Eliquis at home, and currently the patient is high-risk for bleeding as she presented with a hemoglobin of only 4.  Therefore we require ethics consult as were not able to obtain a consent for an endoscopy vs.  Stopping her Eliquis. There is worsening of her lower extremity edema.  Plan to start her on furosemide and obtain an echocardiogram.     Had a lengthy discussion this morning with the patient.  Patient reports she has had heavy periods when she was younger.  She feels that she has a tendency to easily bleed at times.  Discussed with patient that she is taking a blood thinner to stop her from forming a clot and it is risky to stop it as she may form another clot.  She is also risky to give it to her as she may bleed.  Patient points at her hands and reported she has had multiple sticks and she is tired of them.  Reports that she had significant pain during childbirth "however I ended up with the baby in my hand which was very rewarding, I am going through this much pain and I do not feel there is a reward at the end.  That has been said open to whatever you suggest at this point.  Would prefer a pill anticoagulant." Discussed with the patient that heparin which is the IV form can be reversed in the case of an emergency and would like to start with that if she is agreeable.  Patient thought about it for a few seconds and then looked at me and then said "I am okay with IV form of anticoagulant".  I asked the patient if she understands the risks of it.. "Yeah yeah..  Causes bleeding." She has awareness of the situation. Most of my conversation with " the patient have been logical. However, I understand the GI fellow's concerns given her mental status therefore we are proceeding with caution regarding anticoagulation and consulting with ethics regarding discharge home with Eliquis vs. endoscopy. Contacted the NH and spoke with her nurse there. I appreciate their assistance. She said that they are planning to reach out to the director and/or  on site to assess the family situation.       DVT lower extremity was discovered on ultrasound.  Starting on treatment dose of Eliquis. Discussed this with GI.  GI not intervening with a any procedure unless the patient can consent or the family can be contacted to consent.  FX has been consulted over the weekend.  Psychiatry has been consulted today, would appreciate if they would evaluate patient's capacity to consent.    Appreciate Psychiatry assistance in evaluating patient.  Awaiting the recommendations.  Patient denies any complaints, was lying down and eating.  Recommended elevating the head of the bed.  Patient understands and elevated that of the bed by herself.  Overall, very pleasant.      Patient has capacity. GI consulted for evaluation for the need of endoscopy. Initially, GI did not recommend as she was not able to consent. Patient is now able to consent, however, have been stable without further bleeding. GI consulted again to evaluate for endoscopy. Patient is now on Eliquis. Pt is pleasant and denies any complaints. Was laying in bed. Family not at bedside. All questions and concerns answered.      Interval History: see above      Objective:     Vital Signs (Most Recent):  Temp: 97.8 °F (36.6 °C) (12/07/23 1547)  Pulse: 78 (12/07/23 1547)  Resp: 18 (12/07/23 1547)  BP: (!) 122/56 (12/07/23 1547)  SpO2: 98 % (12/07/23 1547) Vital Signs (24h Range):  Temp:  [97.8 °F (36.6 °C)-98.8 °F (37.1 °C)] 97.8 °F (36.6 °C)  Pulse:  [78-92] 78  Resp:  [18] 18  SpO2:  [95 %-99 %] 98 %  BP: (108-122)/(53-60)  122/56     Weight: 79.4 kg (175 lb)  Body mass index is 29.12 kg/m².    Intake/Output Summary (Last 24 hours) at 12/7/2023 1655  Last data filed at 12/7/2023 1453  Gross per 24 hour   Intake 1680 ml   Output 800 ml   Net 880 ml         Physical Exam  Vitals and nursing note reviewed.   Constitutional:       General: She is not in acute distress.     Appearance: She is well-developed. She is obese.   HENT:      Head: Normocephalic and atraumatic.      Nose: Nose normal.      Mouth/Throat:      Mouth: Mucous membranes are moist.      Pharynx: Oropharynx is clear.   Eyes:      Extraocular Movements: Extraocular movements intact.      Pupils: Pupils are equal, round, and reactive to light.   Cardiovascular:      Rate and Rhythm: Normal rate and regular rhythm.   Pulmonary:      Effort: Pulmonary effort is normal.      Breath sounds: Normal breath sounds.   Abdominal:      General: Abdomen is flat. Bowel sounds are normal. There is no distension.      Palpations: Abdomen is soft.      Tenderness: There is no abdominal tenderness.   Musculoskeletal:         General: No tenderness.   Skin:     General: Skin is warm and dry.   Neurological:      Mental Status: She is alert.      Comments:     Psychiatric:         Behavior: Behavior normal.             Significant Labs: All pertinent labs within the past 24 hours have been reviewed.    Significant Imaging: I have reviewed all pertinent imaging results/findings within the past 24 hours.    Assessment/Plan:      * Symptomatic anemia  Acute blood loss anemia  Patient's anemia is currently controlled. Has received 3 units of PRBCs on 11/27 . Etiology likely d/t acute blood loss which was from concern for GIB  Current CBC reviewed-   Lab Results   Component Value Date    HGB 8.5 (L) 11/29/2023    HCT 27.4 (L) 11/29/2023     Monitor serial CBC and transfuse if patient becomes hemodynamically unstable, symptomatic or H/H drops below 7/21.  - last known Hgb was ~11 in 2021, pt has  "Eliquis on med list but she cannot say if she is taking it  - guaiac positive  - Hgb 4.6>> receiving 2 units in ED, another unit on floor  - anemia panel neg  - CBC q8  - transfuse Hgb <7  - GI consulted, appreciate assistance   - low threshold for ICU care if becomes hemodynamically unstable    - GI recommend scope but given patient unable to consent herself and unable to contact family elected for outpt scope   - cont PPI   - will cont to try and get family contact, emergency contact numbers on file are nonworking. Unfortunately no family has come by and NH has the same nonworking numbers on file. However patient reports she keeps numbers in her purse at the NH so will re attempt to contact NH to get family contact     Acute deep vein thrombosis (DVT) of femoral vein of right lower extremity  -- Starting Eliquis 10 mg for 7 days then 5 mg.   -- Discussed with patient, she replied "I understand that there is a high risk of bleeding."      Lower extremity edema  -- start with furosemide 40 mg b.i.d. p.o..  -- strict I&O  -- echocardiogram, as none on file.  -- US DVT LE for history of iliac vein stenosis s/p stenting which then thrmobiss again. Been holding off AC, GI recommendations not to do endoscopy as the bleeding was stabilized with blood transfusion. Therefore, it was deemed by GI as non emergent and outpatient endoscopy was recommended. I felt uncomfrtable with the dc, especially restarting eliquis. Reached out to GI again and case management. Another attempt to get a hold family was still unsuccessful. It was determined that it is best to consult ethics on this case.       Acute cystitis  UA infectious, foul smelling  - Ucx GNR  - CTX pending cultures  -- sensitive to Macrobid, patient refused an IV medicine at this point.  It is okay to switch to Macrobid    Hypokalemia  Patient has hypokalemia which is Acute and currently controlled. Most recent potassium levels reviewed-   Lab Results   Component Value " Date    K 3.0 (L) 11/29/2023   . Will continue potassium replacement per protocol and recheck repeat levels after replacement completed.     Lower GI bleed  - FOBT positive   - Hgb 4 on admit, baseline around ~11 2 years ago  - continue IV protonix infusion  - GI consulted, appreciate assistance  - Type and screen, blood consent obtained  - CBCq 8h.  Transfuse for Hemoglobin <7  - Coagulopathy goals:  Plt >50, INR <1.5  - GI recommend scope, but unable to contact family for consent, given this recommend outpt scope at later time   - H/H stable post transfusion and no blood in BM noted by nursing, however H/H slowly decreasing   - cont PO PPI  - refer to GI outpt for scope in near future once family able to be contacted    Acute blood loss anemia  Patient's anemia is currently controlled. Has received 3 units of PRBCs on 11/27 . Etiology likely d/t acute blood loss concern for GIB  Current CBC reviewed-   Lab Results   Component Value Date    HGB 8.3 (L) 11/30/2023    HCT 27.4 (L) 11/30/2023     Monitor serial CBC and transfuse if patient becomes hemodynamically unstable, symptomatic or H/H drops below 7/21.    Trending down slowly. Maroon colored stool. Reaching out to GI     Chronic deep vein thrombosis (DVT) of proximal vein of right lower extremity  - hx noted  - holding Eliquis in setting of acute blood loss  - will restart when clinically indicated  - will place sequential compression devices     - LE u/s ordered to reeval clot to assist in risk benefit of restarting eliquis      Essential hypertension  - hx noted, hypotensive on admission  - monitor volume status/ hemodynamics closely with acute blood loss  - BP improved, monitor and adjust regimen as needed      VTE Risk Mitigation (From admission, onward)           Ordered     apixaban tablet 10 mg  2 times daily         12/04/23 1807     Place sequential compression device  Until discontinued         11/27/23 1608     IP VTE HIGH RISK PATIENT  Once          11/27/23 1412     Reason for No Pharmacological VTE Prophylaxis  Once        Question:  Reasons:  Answer:  Active Bleeding    11/27/23 1412                    Discharge Planning   BUBBA: 12/8/2023     Code Status: Full Code   Is the patient medically ready for discharge?: No    Reason for patient still in hospital (select all that apply): Patient trending condition, Treatment, and Consult recommendations  Discharge Plan A: Return to nursing home   Discharge Delays: (!) Procedure Scheduling (IR, OR, Labs, Echo, Cath, Echo, EEG) (PAtient to be scoped 12/08/23)              Madison Tubbs DO  Department of Hospital Medicine   [facility] - Intensive Care ([place]-[facility])

## 2023-12-07 NOTE — SUBJECTIVE & OBJECTIVE
Interval History: see above      Objective:     Vital Signs (Most Recent):  Temp: 97.8 °F (36.6 °C) (12/07/23 1547)  Pulse: 78 (12/07/23 1547)  Resp: 18 (12/07/23 1547)  BP: (!) 122/56 (12/07/23 1547)  SpO2: 98 % (12/07/23 1547) Vital Signs (24h Range):  Temp:  [97.8 °F (36.6 °C)-98.8 °F (37.1 °C)] 97.8 °F (36.6 °C)  Pulse:  [78-92] 78  Resp:  [18] 18  SpO2:  [95 %-99 %] 98 %  BP: (108-122)/(53-60) 122/56     Weight: 79.4 kg (175 lb)  Body mass index is 29.12 kg/m².    Intake/Output Summary (Last 24 hours) at 12/7/2023 1655  Last data filed at 12/7/2023 1453  Gross per 24 hour   Intake 1680 ml   Output 800 ml   Net 880 ml         Physical Exam  Vitals and nursing note reviewed.   Constitutional:       General: She is not in acute distress.     Appearance: She is well-developed. She is obese.   HENT:      Head: Normocephalic and atraumatic.      Nose: Nose normal.      Mouth/Throat:      Mouth: Mucous membranes are moist.      Pharynx: Oropharynx is clear.   Eyes:      Extraocular Movements: Extraocular movements intact.      Pupils: Pupils are equal, round, and reactive to light.   Cardiovascular:      Rate and Rhythm: Normal rate and regular rhythm.   Pulmonary:      Effort: Pulmonary effort is normal.      Breath sounds: Normal breath sounds.   Abdominal:      General: Abdomen is flat. Bowel sounds are normal. There is no distension.      Palpations: Abdomen is soft.      Tenderness: There is no abdominal tenderness.   Musculoskeletal:         General: No tenderness.   Skin:     General: Skin is warm and dry.   Neurological:      Mental Status: She is alert.      Comments:     Psychiatric:         Behavior: Behavior normal.             Significant Labs: All pertinent labs within the past 24 hours have been reviewed.    Significant Imaging: I have reviewed all pertinent imaging results/findings within the past 24 hours.

## 2023-12-07 NOTE — NURSING
Pt vss. Afebrile. A&O 3 some confusion about the date. Patient let staff place an PIV in her left AC but pulled it out a couple of hours later. She said she didn't remember pulling it out , then she said it must of have been hurting her for her to pull it out. Explained to patient that she will need a PIV before her procedure tomorrow. MD agreed we can place one right before procedure tomorrow. Patient Incontinent of bowel and bladder. Purwick in place. No new skin breakdown.  Pt educated on the importance of drinking the Golytely for a successful colonoscopy. Patient said she will try but she know she can not drink the full container.   Pt free from falls and injuries. Safety precautions in place. Call light in reach. No further concerns noted at this time.

## 2023-12-07 NOTE — PLAN OF CARE
Pt slept well this shift. A&Ox 4. VSS. Pain controlled with PRN's. Purewick in place. Pt refused to reposition most of the night. Large BM this AM and pt agreed to skin care. Vagina and coccyx red and tender to touch. Skin care completed. Safety precautions in place. Call light in reach. No further concerns noted at this time     Problem: Adult Inpatient Plan of Care  Goal: Plan of Care Review  Outcome: Ongoing, Progressing  Goal: Patient-Specific Goal (Individualized)  Outcome: Ongoing, Progressing  Goal: Absence of Hospital-Acquired Illness or Injury  Outcome: Ongoing, Progressing  Goal: Optimal Comfort and Wellbeing  Outcome: Ongoing, Progressing  Goal: Readiness for Transition of Care  Outcome: Ongoing, Progressing     Problem: Infection  Goal: Absence of Infection Signs and Symptoms  Outcome: Ongoing, Progressing     Problem: Impaired Wound Healing  Goal: Optimal Wound Healing  Outcome: Ongoing, Progressing     Problem: Skin Injury Risk Increased  Goal: Skin Health and Integrity  Outcome: Ongoing, Progressing     Problem: Fall Injury Risk  Goal: Absence of Fall and Fall-Related Injury  Outcome: Ongoing, Progressing

## 2023-12-08 ENCOUNTER — TELEPHONE (OUTPATIENT)
Dept: ENDOSCOPY | Facility: HOSPITAL | Age: 83
End: 2023-12-08
Payer: MEDICARE

## 2023-12-08 VITALS
SYSTOLIC BLOOD PRESSURE: 93 MMHG | HEART RATE: 90 BPM | TEMPERATURE: 98 F | BODY MASS INDEX: 29.17 KG/M2 | RESPIRATION RATE: 16 BRPM | OXYGEN SATURATION: 98 % | DIASTOLIC BLOOD PRESSURE: 55 MMHG | HEIGHT: 65 IN | WEIGHT: 175.06 LBS

## 2023-12-08 PROBLEM — E87.1 HYPONATREMIA: Status: RESOLVED | Noted: 2023-12-05 | Resolved: 2023-12-08

## 2023-12-08 PROBLEM — K92.2 LOWER GI BLEED: Status: RESOLVED | Noted: 2023-11-27 | Resolved: 2023-12-08

## 2023-12-08 PROBLEM — D64.9 SYMPTOMATIC ANEMIA: Status: RESOLVED | Noted: 2023-11-27 | Resolved: 2023-12-08

## 2023-12-08 PROBLEM — N30.00 ACUTE CYSTITIS: Status: RESOLVED | Noted: 2023-11-28 | Resolved: 2023-12-08

## 2023-12-08 PROBLEM — E87.6 HYPOKALEMIA: Status: RESOLVED | Noted: 2023-11-27 | Resolved: 2023-12-08

## 2023-12-08 PROBLEM — I35.8 AORTIC VALVE SCLEROSIS: Status: ACTIVE | Noted: 2023-12-08

## 2023-12-08 PROBLEM — D62 ACUTE BLOOD LOSS ANEMIA: Status: RESOLVED | Noted: 2021-11-25 | Resolved: 2023-12-08

## 2023-12-08 LAB — SARS-COV-2 RNA RESP QL NAA+PROBE: NOT DETECTED

## 2023-12-08 PROCEDURE — 87635 SARS-COV-2 COVID-19 AMP PRB: CPT | Performed by: STUDENT IN AN ORGANIZED HEALTH CARE EDUCATION/TRAINING PROGRAM

## 2023-12-08 PROCEDURE — 25000003 PHARM REV CODE 250: Performed by: STUDENT IN AN ORGANIZED HEALTH CARE EDUCATION/TRAINING PROGRAM

## 2023-12-08 RX ORDER — FUROSEMIDE 20 MG/1
20 TABLET ORAL DAILY PRN
Qty: 90 TABLET | Refills: 0 | Status: SHIPPED | OUTPATIENT
Start: 2023-12-08 | End: 2023-12-08 | Stop reason: SDUPTHER

## 2023-12-08 RX ORDER — FUROSEMIDE 20 MG/1
20 TABLET ORAL DAILY PRN
Qty: 90 TABLET | Refills: 0 | Status: SHIPPED | OUTPATIENT
Start: 2023-12-08 | End: 2024-03-07

## 2023-12-08 RX ORDER — SODIUM CHLORIDE 0.9 % (FLUSH) 0.9 %
3 SYRINGE (ML) INJECTION
Status: CANCELLED | OUTPATIENT
Start: 2023-12-08

## 2023-12-08 RX ORDER — FENTANYL CITRATE 50 UG/ML
25 INJECTION, SOLUTION INTRAMUSCULAR; INTRAVENOUS EVERY 5 MIN PRN
Status: CANCELLED | OUTPATIENT
Start: 2023-12-08

## 2023-12-08 RX ORDER — DIPHENHYDRAMINE HYDROCHLORIDE 50 MG/ML
25 INJECTION INTRAMUSCULAR; INTRAVENOUS EVERY 6 HOURS PRN
Status: CANCELLED | OUTPATIENT
Start: 2023-12-08

## 2023-12-08 RX ORDER — HALOPERIDOL 5 MG/ML
0.5 INJECTION INTRAMUSCULAR EVERY 10 MIN PRN
Status: CANCELLED | OUTPATIENT
Start: 2023-12-08

## 2023-12-08 RX ORDER — TALC
6 POWDER (GRAM) TOPICAL NIGHTLY PRN
Qty: 60 TABLET | Refills: 0 | Status: SHIPPED | OUTPATIENT
Start: 2023-12-08 | End: 2024-02-06

## 2023-12-08 RX ORDER — PROCHLORPERAZINE EDISYLATE 5 MG/ML
5 INJECTION INTRAMUSCULAR; INTRAVENOUS EVERY 30 MIN PRN
Status: CANCELLED | OUTPATIENT
Start: 2023-12-08

## 2023-12-08 RX ORDER — HYDROMORPHONE HYDROCHLORIDE 1 MG/ML
0.2 INJECTION, SOLUTION INTRAMUSCULAR; INTRAVENOUS; SUBCUTANEOUS EVERY 5 MIN PRN
Status: CANCELLED | OUTPATIENT
Start: 2023-12-08

## 2023-12-08 RX ORDER — PANTOPRAZOLE SODIUM 40 MG/1
40 TABLET, DELAYED RELEASE ORAL DAILY
Qty: 90 TABLET | Refills: 0 | Status: SHIPPED | OUTPATIENT
Start: 2023-12-09 | End: 2024-03-08

## 2023-12-08 RX ADMIN — APIXABAN 10 MG: 5 TABLET, FILM COATED ORAL at 08:12

## 2023-12-08 RX ADMIN — PANTOPRAZOLE SODIUM 40 MG: 40 TABLET, DELAYED RELEASE ORAL at 08:12

## 2023-12-08 NOTE — NURSING
Pt refusing to drink golytely, will only take tiny sips. X1 solid BM this shift so far. ON call hospital doctor and on-call doc for GI notified.

## 2023-12-08 NOTE — TELEPHONE ENCOUNTER
Contacted the patient /daughter marquise number to schedule an endoscopy procedure(s) colonoscopy. The patient did not answer the call and was not able to leave [facility].

## 2023-12-08 NOTE — TELEPHONE ENCOUNTER
"----- Message from Carole Pugh sent at 2023  8:37 AM CST -----    ----- Message -----  From: Shira Sommer MD  Sent: 2023   7:19 AM CST  To: [facility]    Procedure: Colonoscopy    Diagnosis: Screening colonoscopy    Procedure Timin-4 weeks    #If within 4 weeks selected, please [place] as high priority#    #If greater than 12 weeks, please select "5-12 weeks" and delay sending until 2 months prior to requested date#     Provider: Any endoscopist    Location: No Preference    Additional Scheduling Information: Blood thinners    Prep Specifications:Standard prep    Have you attached a patient to this message: yes        "

## 2023-12-08 NOTE — CARE UPDATE
GI Care Update Note:    Patient scheduled for colonoscopy today but she did not drink her prep last night. She has been slowly sipping the prep since the morning with no real improvement. Will cancel her colonoscopy and scheduled for outpatient as she is not having any active bleeding and H&H has been stable since restarting AC. Schedule request sent. We are signing off.     Shira Sommer MD  Gastroenterology

## 2023-12-08 NOTE — PLAN OF CARE
NURSING HOME ORDERS    12/08/2023  [facility]  [facility] INTENSIVE [facility] ([place])  [address]  [facility] [id number]  Dept: [phone]  Loc: [phone]     Admit to Nursing Home:  Home or Self Care    Diagnoses:  Active Hospital Problems    Diagnosis  POA    Acute deep vein thrombosis (DVT) of femoral vein of right lower extremity [I82.411]  No    Lower extremity edema [R60.0]  Clinically Undetermined    Chronic deep vein thrombosis (DVT) of proximal vein of right lower extremity [I82.4Y1]  Yes     Pt is an 81yoF with PMHx of chronic venous insufficiency/venous HTN s/p stent previously followed by vascular surgery in [place] who presented to the ED on 7/21 complaining of worsening swelling and pain her RLE x1 day. She tested positive for COVID-19 in the ED, but she denies any cough, fatigue, malaise, fevers, chills, nausea, or vomiting. An US was performed on her leg, revealing a DVT.       Vascular surgery was consulted for management of her DVT. Pt has a history of DVT in her left leg, and she states that she was on anticoagulation injections of some sort, but she stopped taking the injections a while back. She denies taking any oral anticoagulants. She also reports daily smoking. Pt reports that prior to the swelling that started a couple days ago, she was able to walk around with no issues. Now she is unable to walk due to extensive swelling. Baseline swelling and skin color changes appreciated on her left leg, but her right leg was notable more edematous. +3 pitting edema to her right leg appreciated on exam. Right DP signals appreciated on doppler. Pt denies any sensory deficits and was able to move her toes.          Essential hypertension [I10]  Yes      Resolved Hospital Problems    Diagnosis Date Resolved POA    *Symptomatic anemia [D64.9] 12/08/2023 Yes    Hyponatremia [E87.1] 12/08/2023 Yes    Acute cystitis [N30.00] 12/08/2023 Yes    Lower GI bleed [K92.2]  12/08/2023 Yes    Hypokalemia [E87.6] 12/08/2023 Yes    Acute blood loss anemia [D62] 12/08/2023 Yes       Patient is homebound due to:  Symptomatic anemia    Allergies:Review of patient's allergies indicates:  No Known Allergies    Vitals:  Routine    Diet: regular diet    Activities:   Activity as tolerated    Goals of Care Treatment Preferences:  Code Status: Full Code      Labs:  CBC, CMP every 3 days for 6 occurrences. Please report to PCP.    Nursing Precautions:  Fall    Consults:   Nutrition to evaluate and recommend diet     Miscellaneous Care: CHF Care: Daily Weight with notification of MD/NP of 2lb or > increase in 24 hours    v/s and O2 sat every shift    Oxygen as needed for sats <90%    Report abnormal breath sounds to MD/NP    Edema checks q shift- notify MD/NP of increased edema    Task segmentation by nursing for daily care to decrease exertion    Schedule appointment with cardiologist, 3 Weeks. Need to establish with cardiology if not already.    CHF education to include diet ,medication, and CHF flags for MD notification                     Medications: Discontinue all previous medication orders, if any. See new list below.     Medication List        START taking these medications      furosemide 20 MG tablet  Commonly known as: LASIX  Take 1 tablet (20 mg total) by mouth daily as needed (weigth gain of 3 lb in one week. 2+ to 3+ lower extremity edema).     melatonin 3 mg tablet  Commonly known as: MELATIN  Take 2 tablets (6 mg total) by mouth nightly as needed for Insomnia.     nicotine 7 mg/24 hr  Commonly known as: NICODERM CQ  Place 1 patch onto the skin once daily.     pantoprazole 40 MG tablet  Commonly known as: PROTONIX  Take 1 tablet (40 mg total) by mouth once daily. Must follow to decide to refill  Start taking on: December 9, 2023            CHANGE how you take these medications      * apixaban 5 mg Tab  Commonly known as: ELIQUIS  Take 2 tablets (10 mg total) by mouth 2 (two) times  daily. for 2 days  What changed: how much to take     * apixaban 5 mg Tab  Commonly known as: ELIQUIS  Take 1 tablet (5 mg total) by mouth 2 (two) times daily. Must follow with PCP for refills if decided  Start taking on: December 11, 2023  What changed: You were already taking a medication with the same name, and this prescription was added. Make sure you understand how and when to take each.           * This list has 2 medication(s) that are the same as other medications prescribed for you. Read the directions carefully, and ask your doctor or other care provider to review them with you.                CONTINUE taking these medications      hydroCHLOROthiazide 12.5 MG Tab  Commonly known as: HYDRODIURIL  Take 12.5 mg by mouth once daily.     multivitamin Tab  Take 1 tablet by mouth once daily.     VITAMIN C 500 MG tablet  Generic drug: ascorbic acid (vitamin C)  Take 500 mg by mouth 2 (two) times daily.     vitamin D 1000 units Tab  Commonly known as: VITAMIN D3  Take 1 tablet (1,000 Units total) by mouth once daily.                Immunizations Administered as of 12/8/2023       No immunizations on file.            Test ordered    Some patients may experience side effects after vaccination.  These may include fever, headache, muscle or joint aches.  Most symptoms resolve with 24-48 hours and do not require urgent medical evaluation unless they persist for more than 72 hours or symptoms are concerning for an unrelated medical condition.          _________________________________  Madison Tubbs DO  12/08/2023

## 2023-12-08 NOTE — PLAN OF CARE
Problem: Adult Inpatient Plan of Care  Goal: Plan of Care Review  12/7/2023 1843 by Bill Solorzano RN  Outcome: Ongoing, Progressing  12/7/2023 1824 by Bill Solorzano RN  Outcome: Ongoing, Progressing  Goal: Patient-Specific Goal (Individualized)  12/7/2023 1843 by Bill Solorzano RN  Outcome: Ongoing, Progressing  12/7/2023 1824 by Bill Solorzano RN  Outcome: Ongoing, Progressing  Goal: Absence of Hospital-Acquired Illness or Injury  12/7/2023 1843 by Bill Solorzano RN  Outcome: Ongoing, Progressing  12/7/2023 1824 by Bill Solorzano RN  Outcome: Ongoing, Progressing  Goal: Optimal Comfort and Wellbeing  12/7/2023 1843 by Bill Solorzano RN  Outcome: Ongoing, Progressing  12/7/2023 1824 by Bill Solorzano RN  Outcome: Ongoing, Progressing  Goal: Readiness for Transition of Care  12/7/2023 1843 by Bill Solorzano RN  Outcome: Ongoing, Progressing  12/7/2023 1824 by Bill Solorzano RN  Outcome: Ongoing, Progressing     Problem: Infection  Goal: Absence of Infection Signs and Symptoms  12/7/2023 1843 by Bill Solorzano RN  Outcome: Ongoing, Progressing  12/7/2023 1824 by Bill Solorzano RN  Outcome: Ongoing, Progressing     Problem: Impaired Wound Healing  Goal: Optimal Wound Healing  12/7/2023 1843 by Bill Solorzano RN  Outcome: Ongoing, Progressing  12/7/2023 1824 by Bill Solorzano RN  Outcome: Ongoing, Progressing

## 2023-12-08 NOTE — NURSING
Pt vss. Bp soft 93/55. Afebrile.  A&O. Move all externalities. Bilateral Lower extremities edematous. Pt was cleaned up form incontinence. No open sores noted.  No PIV in place to be removed. Report called to Kristy ,nurse at [facility].

## 2023-12-08 NOTE — PROGRESS NOTES
"[facility] - Intensive Care ([facility]-)  Adult Nutrition  Progress Note    SUMMARY       Recommendations    1. Resume Regular diet when able.   2. RD to monitor & follow-up.    Goals: Meet % EEN, EPN by RD f/u date  Nutrition Goal Status: new  Communication of RD Recs: other (comment) (POC)    Assessment and Plan    No nutrition dx at this time.    Reason for Assessment    Reason For Assessment: length of stay  Diagnosis: other (see comments) (Anemia)  Relevant Medical History: HTN  Interdisciplinary Rounds: did not attend    General Information Comments: NPO for colonoscopy (which was cancelled). Prior to NPO status, pt tolerating diet w/ 100% PO intake. UBW: 175#. Appears nourished w/ no indicators of malnutrition.  Nutrition Discharge Planning: Adequate PO intake    Nutrition/Diet History    Factors Affecting Nutritional Intake: NPO    Anthropometrics    Temp: 97.8 °F (36.6 °C)  Height: 5' 5" (165.1 cm)  Height (inches): 65 in  Weight Method: Bed Scale  Weight: 79.4 kg (175 lb 0.7 oz)  Weight (lb): 175.05 lb  Ideal Body Weight (IBW), Female: 125 lb  % Ideal Body Weight, Female (lb): 140.04 %  BMI (Calculated): 29.1  BMI Grade: 25 - 29.9 - overweight    Lab/Procedures/Meds    Pertinent Labs Reviewed: reviewed  Pertinent Medications Reviewed: reviewed    Estimated/Assessed Needs    Weight Used For Calorie Calculations: 79.4 kg (175 lb 0.7 oz)    Energy Calorie Requirements (kcal): 1500 kcal/d  Energy Need Method: [place]-St Jeor (1.2 PAL)    Protein Requirements: 79 g/d (1 g/kg)  Weight Used For Protein Calculations: 79.4 kg (175 lb 0.7 oz)    Estimated Fluid Requirement Method: other (see comments) (Per MD or 1 mL/kcal)  RDA Method (mL): 1500    Nutrition Prescription Ordered    Current Diet Order: NPO  Nutrition Order Comments: Was on Regular diet & tolerating.    Evaluation of Received Nutrient/Fluid Intake    I/O: +1.2L since admit    Comments: LBM: 12/7    Tolerance: was tolerating    Nutrition " Risk    Level of Risk/Frequency of Follow-up:  (1x/week)     Monitor and Evaluation    Food and Nutrient Intake: food and beverage intake, energy intake  Food and Nutrient Adminstration: diet order  Physical Activity and Function: nutrition-related ADLs and IADLs  Anthropometric Measurements: weight, weight change  Biochemical Data, Medical Tests and Procedures: inflammatory profile, lipid profile, glucose/endocrine profile, gastrointestinal profile, electrolyte and renal panel  Nutrition-Focused Physical Findings: overall appearance     Nutrition Follow-Up    RD Follow-up?: Yes

## 2023-12-08 NOTE — PLAN OF CARE
Recommendations     1. Resume Regular diet when able.   2. RD to monitor & follow-up.     Goals: Meet % EEN, EPN by RD f/u date  Nutrition Goal Status: new  Communication of RD Recs: other (comment) (POC)

## 2023-12-08 NOTE — PLAN OF CARE
Pt A&Ox3, disoriented to time. NPO since MD for possible endoscopy today. Pt finished approximately 300ml of bowel prep. Satting 100% on RA. Bed low and locked, call light and belongings w/in reach, bed alarm on.      Problem: Adult Inpatient Plan of Care  Goal: Plan of Care Review  Outcome: Ongoing, Progressing  Goal: Patient-Specific Goal (Individualized)  Outcome: Ongoing, Progressing  Goal: Absence of Hospital-Acquired Illness or Injury  Outcome: Ongoing, Progressing  Goal: Optimal Comfort and Wellbeing  Outcome: Ongoing, Progressing  Goal: Readiness for Transition of Care  Outcome: Ongoing, Progressing     Problem: Infection  Goal: Absence of Infection Signs and Symptoms  Outcome: Ongoing, Progressing     Problem: Impaired Wound Healing  Goal: Optimal Wound Healing  Outcome: Ongoing, Progressing     Problem: Skin Injury Risk Increased  Goal: Skin Health and Integrity  Outcome: Ongoing, Progressing     Problem: Fall Injury Risk  Goal: Absence of Fall and Fall-Related Injury  Outcome: Ongoing, Progressing

## 2023-12-08 NOTE — PLAN OF CARE
12/08/23 0951   Post-Acute Status   Post-Acute Authorization Placement   Post-Acute Placement Status Set-up Complete/Auth obtained   Coverage MEDICARE - MEDICARE PART A & B -   Discharge Delays None known at this time  (Colonoscopy to be scheduled Outpatient)   Discharge Plan   Discharge Plan A Return to nursing home  ([facility] Phone: [phone])     COVID test ordered and awaiting NH orders    Patient will need transportation set up    NH orders sent via [facility]    Patient is assigned to room [ [address]] and assigned nurse to call report to [phone]    Transportation scheduled for 4:15 pm     Medical transport giving to the assigned nurse      Kira Russell RN  Case Management  [facility]  [phone]

## 2023-12-09 NOTE — PLAN OF CARE
[facility] - Intensive Care ([facility])  Discharge Final Note    Primary Care Provider: Jennifer Schwartz MD    Expected Discharge Date: 12/8/2023    Final Discharge Note (most recent)       Final Note - 12/08/23 0951          Post-Acute Status    Post-Acute Authorization Placement     Post-Acute Placement Status Set-up Complete/Auth obtained     Coverage MEDICARE - MEDICARE PART A & B -     Discharge Delays None known at this time   Colonoscopy to be scheduled Outpatient                    Important Message from Medicare  Important Message from Medicare regarding Discharge Appeal Rights: Explained to patient/caregiver, Signed/date by patient/caregiver     Date IMM was signed: 12/06/23  Time IMM was signed: 1308    Contact Info       [facility] HEMATOLOGY/ONCOLOGY   Specialty: Hematology and Oncology    [facility] [address]   Phone: [phone]       Next Steps: Follow up    Instructions: Will need to follow up with hematolgy as outpatient    [address] [id number] [phone]       Next Steps: Follow up    [facility] [address]   Specialty: Gastroenterology    [address]  [facility] [address]   Phone: [phone]       Next Steps: Follow up in 1 week(s)    Instructions: Follow up with Colonoscopy as Outpatient procedure    Jennifer Schwartz MD   Specialty: Internal Medicine   Relationship: PCP - General    [address] [facility] [address]   Phone: [phone]       Next Steps: Follow up in 3 day(s)          [facility] - Intensive Care ([facility]  Discharge Final Note    Primary Care Provider: Jennifer Schwartz MD    Expected Discharge Date: 12/8/2023    Final Discharge Note (most recent)       Final Note - 12/08/23 0951          Post-Acute Status    Post-Acute Authorization Placement     Post-Acute Placement Status Set-up Complete/Auth obtained     Coverage MEDICARE - MEDICARE PART A & B -      Discharge Delays None known at this time   Colonoscopy to be scheduled Outpatient                    Important Message from Medicare  Important Message from Medicare regarding Discharge Appeal Rights: Explained to patient/caregiver, Signed/date by patient/caregiver     Date IMM was signed: 12/06/23  Time IMM was signed: 1308    Contact Info       [facility] HEMATOLOGY/ONCOLOGY   Specialty: Hematology and Oncology    [address]  [facility] [address]   Phone: [phone]       Next Steps: Follow up    Instructions: Will need to follow up with hematolgy as outpatient    [address] [id number] [phone]       Next Steps: Follow up    [facility] [address]   Specialty: Gastroenterology    [address]  [facility] [address]   Phone: [phone]       Next Steps: Follow up in 1 week(s)    Instructions: Follow up with Colonoscopy as Outpatient procedure    Jennifer Schwartz MD   Specialty: Internal Medicine   Relationship: PCP - General    [address] [facility] [address]   Phone: [phone]       Next Steps: Follow up in 3 day(s)            CM met with patient/family and discussed discharge plans, patient to DC  to [facility].  No medications delivered to bedside. No  HME/DME delivered. Follow up appointment to be scheduled for colonoscopy. Transportation arranged with [place] via stretcher.     Kira Russell RN  Case Management  [facility]  [phone]

## 2023-12-11 ENCOUNTER — TELEPHONE (OUTPATIENT)
Dept: GASTROENTEROLOGY | Facility: CLINIC | Age: 83
End: 2023-12-11
Payer: MEDICARE

## 2023-12-11 NOTE — TELEPHONE ENCOUNTER
----- Message from Brittanie Laureano sent at 12/11/2023 11:35 AM CST -----  [facility] requesting appt from a referral for the following K92.2 (ICD-10-CM) - Lower GI bleed  D62 (ICD-10-CM) - Acute blood loss anemia   .... Please call and adv @ [phone]

## 2023-12-12 ENCOUNTER — TELEPHONE (OUTPATIENT)
Dept: ADMINISTRATIVE | Facility: OTHER | Age: 83
End: 2023-12-12
Payer: MEDICARE

## 2023-12-12 ENCOUNTER — PATIENT MESSAGE (OUTPATIENT)
Dept: ADMINISTRATIVE | Facility: OTHER | Age: 83
End: 2023-12-12
Payer: MEDICARE

## 2023-12-12 NOTE — TELEPHONE ENCOUNTER
Unable to leave message with scheduled appointment for [facility] department of 1-4-2024. My Chart message to be sent.

## 2023-12-18 ENCOUNTER — TELEPHONE (OUTPATIENT)
Dept: ENDOSCOPY | Facility: HOSPITAL | Age: 83
End: 2023-12-18
Payer: MEDICARE

## 2023-12-18 NOTE — TELEPHONE ENCOUNTER
"----- Message from Carole Pugh sent at 2023  2:24 PM CST -----  Regarding: FW: EGD/Colonoscopy    ----- Message -----  From: Franca Collazo NP  Sent: 2023  10:26 AM CST  To: [facility]  Subject: EGD/Colonoscopy                                  Procedure: EGD/Colonoscopy    Diagnosis: Rectal bleeding and GI bleed    Procedure Timin-4 weeks    #If within 4 weeks selected, please barrington as high priority#    #If greater than 12 weeks, please select "5-12 weeks" and delay sending until 3 months prior to requested date#     Provider: Any GI provider    Location: [address]    Additional Scheduling Information: Blood thinners    Prep Specifications:Standard prep    Is the patient taking a GLP-1 Agonist:no    Have you attached a patient to this message: yes      #Procedure deferred while inpatient due to inability to contact NOK. Daughters number is on chart and was not able to be reached.  working on getting updated contact information for patient to try to plan for urgent outpatient procedure. Patient is a resident of [facility].        "

## 2023-12-18 NOTE — TELEPHONE ENCOUNTER
"----- Message from Carole Pugh sent at 2023  2:24 PM CST -----  Regarding: FW: EGD/Colonoscopy    ----- Message -----  From: Franca Collazo NP  Sent: 2023  10:26 AM CST  To: [facility] Patients  Subject: EGD/Colonoscopy                                  Procedure: EGD/Colonoscopy    Diagnosis: Rectal bleeding and GI bleed    Procedure Timin-4 weeks    #If within 4 weeks selected, please barrington as high priority#    #If greater than 12 weeks, please select "5-12 weeks" and delay sending until 3 months prior to requested date#     Provider: Any GI provider    Location: [address]    Additional Scheduling Information: Blood thinners    Prep Specifications:Standard prep    Is the patient taking a GLP-1 Agonist:no    Have you attached a patient to this message: yes      #Procedure deferred while inpatient due to inability to contact NOK. Daughters number is on chart and was not able to be reached.  working on getting updated contact information for patient to try to plan for urgent outpatient procedure. Patient is a resident of [facility].        "

## 2023-12-18 NOTE — TELEPHONE ENCOUNTER
Contacted the patient to schedule an endoscopy procedure. The patient did not answer the call and Unable to leave a voice mail.

## 2023-12-24 NOTE — DISCHARGE SUMMARY
"[facility] ([address] Medicine  Discharge Summary      Patient Name: Ana Vasquez  MRN: [id number]  ELMA: [id number]  Patient Class: IP- Inpatient  Admission Date: 11/27/2023  Hospital Length of Stay: 11 days  Discharge Date and Time: 12/8/2023  4:50 PM  Attending Physician: Genny att. providers found   Discharging Provider: Madison Tubbs DO  Primary Care Provider: Jennifer Schwartz MD  [facility] Medicine Team: [facility]OS[facility] MED Q Madison Tubbs DO  Primary Care Team: [facility] HOSP MED Q    HPI:   Ana Vasquez is a 83 y.o. female with a hx of DVT and HTN presents to the ED from her NH for shortness of breath with exertion. Patient is a poor historian. Needing frequent redirection during the interview. Patient unable to tell me why she is here. When asked about her needing blood, she states "she always has low blood level." Per ED provider, " She reports someone in her nursing home told her that her blood levels were low but she always has "low blood levels". She denies change in recent medications and thinks she continues to take Eliquis.  She denies noting recent black or tarry stools, gnawing epigastric pain, nausea, vomiting, chest pain arrest, swelling of the extremities, fever, cough.  She denies knowledge of past gastrointestinal bleeding episodes.  She thinks she takes a multivitamin but is unsure if it may contain iron." Patient denies any rectal bleeding, hematuria and hemoptysis on my exam. Denies any further complaints. Endorses improvement of shortness of breath.     ED: AF, tachycardic and hypotensive on admission. Hgb 4.3. K 3.3. . CXR showed no acute process. 2 units pRBCs ordered in ED and transfused. GI consulted. Given IV protonix in ED.     Procedure(s) (LRB):  COLONOSCOPY (N/A)      Hospital Course:   Patient admitted for anemia 2/2 possible GIB as patient on eliquis at home. AC held. Transfused 3 units,  trending H/H. GI consulted. No melena or bleeding noted. " "UA infectious and started on CTX, Ucx GNR awaiting speciation. Unable to contact family, contact numbers on chart are out of service, contacted NH who have the same numbers on file. Attempting to contact family for consent for scope as patient with underlying cognitive decline and unable to consent herself per GI. If unable to contact family, plan for scope outpt. H/H stable and no further bleeding. Risk benefit for restarting eliquis, for prior DVT in 2021. LE u/s ordered to eval DVT.     Patient is on Eliquis at home, and currently the patient is high-risk for bleeding as she presented with a hemoglobin of only 4. Therefore we require ethics consult as were not able to obtain a consent for an endoscopy vs. Stopping her Eliquis. There is worsening of her lower extremity edema.  Plan to start her on furosemide and obtain an echocardiogram.     Had a lengthy discussion this morning with the patient.  Patient reports she has had heavy periods when she was younger.  She feels that she has a tendency to easily bleed at times.  Discussed with patient that she is taking a blood thinner to stop her from forming a clot and it is risky to stop it as she may form another clot.  She is also risky to give it to her as she may bleed.  Patient points at her hands and reported she has had multiple sticks and she is tired of them.  Reports that she had significant pain during childbirth "however I ended up with the baby in my hand which was very rewarding, I am going through this much pain and I do not feel there is a reward at the end.  That has been said open to whatever you suggest at this point.  Would prefer a pill anticoagulant." Discussed with the patient that heparin which is the IV form can be reversed in the case of an emergency and would like to start with that if she is agreeable.  Patient thought about it for a few seconds and then looked at me and then said "I am okay with IV form of anticoagulant".  I asked the " "patient if she understands the risks of it.. "Yeah yeah..  Causes bleeding." She has awareness of the situation. Most of my conversation with the patient have been logical. However, I understand the GI fellow's concerns given her mental status therefore we are proceeding with caution regarding anticoagulation and consulting with ethics regarding discharge home with Eliquis vs. endoscopy. Ethics did not contact me or reach out. Therefore I consulted psych. Who eval pt and said that she has capacity. DVT lower extremity was discovered on ultrasound.  Starting on treatment dose of Eliquis. Discussed this with GI.  GI not intervening with a any procedure unless the patient can consent or the family can be contacted to consent. Appreciate Psychiatry assistance in evaluating patient. Patient has capacity per psych. GI consulted for evaluation for the need of endoscopy (again). She were not able to tolerate the prep. Back to NH.     Goals of Care Treatment Preferences:  Code Status: Full Code    Physical Exam  Constitutional:       Appearance: Normal appearance. She is obese.   HENT:      Head: Normocephalic and atraumatic.      Right Ear: External ear normal.      Left Ear: External ear normal.      Nose: Nose normal.   Eyes:      General: No scleral icterus.  Cardiovascular:      Rate and Rhythm: Normal rate.      Pulses: Normal pulses.      Heart sounds: Normal heart sounds.   Pulmonary:      Effort: Pulmonary effort is normal.      Breath sounds: Normal breath sounds.   Abdominal:      General: Abdomen is flat. Bowel sounds are normal. There is no distension.      Palpations: Abdomen is soft.      Tenderness: There is no abdominal tenderness.   Musculoskeletal:      Right lower leg: No edema.      Left lower leg: No edema.   Skin:     Coloration: Skin is not jaundiced.   Neurological:      General: No focal deficit present.      Mental Status: She is alert.   Psychiatric:         Mood and Affect: Mood normal.         " Behavior: Behavior normal.         Thought Content: Thought content normal.           Consults:   Consults (From admission, onward)          Status Ordering Provider     Inpatient consult to Psychiatry  Once        Provider:  (Not yet assigned)    Completed [facility] Medicine PharmD Consult  Once        Provider:  (Not yet assigned)    Completed JENNA MEAD     Inpatient consult to Gastroenterology  Once        Provider:  (Not yet assigned)    Completed VIRGINIA PEDERSEN            No new Assessment & Plan notes have been filed under this hospital service since the last note was generated.  Service: Hospital Medicine    Final Active Diagnoses:    Diagnosis Date Noted POA    Aortic valve sclerosis [I35.8] 12/08/2023 Yes    Acute deep vein thrombosis (DVT) of femoral vein of right lower extremity [I82.411] 12/04/2023 No    Lower extremity edema [R60.0] 12/02/2023 Clinically Undetermined    Chronic deep vein thrombosis (DVT) of proximal vein of right lower extremity [I82.4Y1] 07/21/2021 Yes    Essential hypertension [I10] 07/01/2020 Yes      Problems Resolved During this Admission:    Diagnosis Date Noted Date Resolved POA    PRINCIPAL PROBLEM:  Symptomatic anemia [D64.9] 11/27/2023 12/08/2023 Yes    Hyponatremia [E87.1] 12/05/2023 12/08/2023 Yes    Acute cystitis [N30.00] 11/28/2023 12/08/2023 Yes    Lower GI bleed [K92.2] 11/27/2023 12/08/2023 Yes    Hypokalemia [E87.6] 11/27/2023 12/08/2023 Yes    Acute blood loss anemia [D62] 11/25/2021 12/08/2023 Yes       Discharged Condition: stable    Disposition: Home or Self Care    Follow Up:   Follow-up Information       [facility] HEMATOLOGY/ONCOLOGY Follow up.    Specialty: Hematology and Oncology  Why: Will need to follow up with hematolgy as outpatient  Contact information:  [address]  [facility] [address]  [phone]             [facility]  Follow up.    Contact information:  [address] [facility] [place]  [address]  [phone]             [facility] 4th Fl Follow up in 1 week(s).    Specialty: Gastroenterology  Why: Follow up with Colonoscopy as Outpatient procedure  Contact information:  [address], [address]th Floor  [place] [address] [phone]  Additional information:  GI Center - Main Building, 4th Floor   Please park in [facility] and use Atrium elevator             Jennifer Schwartz MD Follow up in 3 day(s).    Specialty: Internal Medicine  Contact information:  [address] [facility]  [place] [address]  [phone]                           Patient Instructions:      Ambulatory referral/consult to Smoking Cessation Program   Standing Status: Future   Referral Priority: Routine Referral Type: Consultation   Referral Reason: Specialty Services Required   Requested Specialty: CTTS   Number of Visits Requested: 1     Ambulatory referral/consult to [facility]   Standing Status: Future   Referral Priority: Routine Referral Type: Consultation   Referral Reason: Specialty Services Required   Requested Specialty: Pain Medicine   Number of Visits Requested: 1     Ambulatory referral/consult to Gastroenterology   Standing Status: Future   Referral Priority: Routine Referral Type: Consultation   Referral Reason: Specialty Services Required   Requested Specialty: Gastroenterology   Number of Visits Requested: 1     Ambulatory referral/consult to Cardiology   Standing Status: Future   Referral Priority: Routine Referral Type: Consultation   Referral Reason: Specialty Services Required   Requested Specialty: Cardiology   Number of Visits Requested: 1     Diet Cardiac     Notify your health care provider if you experience any of the following:  temperature >100.4     Notify your health care provider if you experience any of the following:  increased confusion or weakness     Notify your health care provider if you experience any of the following:   Order Comments: Bleeding      Notify your health care provider if you experience any of the following:     Notify your health care provider if you experience any of the following:  increased confusion or weakness     Notify your health care provider if you experience any of the following:  persistent dizziness, light-headedness, or visual disturbances     Notify your health care provider if you experience any of the following:  worsening rash     Notify your health care provider if you experience any of the following:  severe persistent headache     Notify your health care provider if you experience any of the following:  difficulty breathing or increased cough     Notify your health care provider if you experience any of the following:  redness, tenderness, or signs of infection (pain, swelling, redness, odor or green/yellow discharge around incision site)     Notify your health care provider if you experience any of the following:  severe uncontrolled pain     Notify your health care provider if you experience any of the following:  persistent nausea and vomiting or diarrhea     Notify your health care provider if you experience any of the following:  temperature >100.4     Activity as tolerated     Activity as tolerated       Significant Diagnostic Studies: Labs: All labs within the past 24 hours have been reviewed    Pending Diagnostic Studies:       None           Medications:  Reconciled Home Medications:      Medication List        START taking these medications      furosemide 20 MG tablet  Commonly known as: LASIX  Take 1 tablet (20 mg total) by mouth daily as needed (weigth gain of 3 lb in one week.).     melatonin 3 mg tablet  Commonly known as: MELATIN  Take 2 tablets (6 mg total) by mouth nightly as needed for Insomnia.     nicotine 7 mg/24 hr  Commonly known as: NICODERM CQ  Place 1 patch onto the skin once daily.     pantoprazole 40 MG tablet  Commonly known as: PROTONIX  Take 1 tablet (40 mg total) by mouth once daily. Must  follow to decide to refill            CHANGE how you take these medications      * apixaban 5 mg Tab  Commonly known as: ELIQUIS  Take 1 tablet (5 mg total) by mouth 2 (two) times daily. Must follow with PCP for refills if decided  What changed: additional instructions           * This list has 1 medication(s) that are the same as other medications prescribed for you. Read the directions carefully, and ask your doctor or other care provider to review them with you.                CONTINUE taking these medications      hydroCHLOROthiazide 12.5 MG Tab  Commonly known as: HYDRODIURIL  Take 12.5 mg by mouth once daily.     multivitamin Tab  Take 1 tablet by mouth once daily.     VITAMIN C 500 MG tablet  Generic drug: ascorbic acid (vitamin C)  Take 500 mg by mouth 2 (two) times daily.     vitamin D 1000 units Tab  Commonly known as: VITAMIN D3  Take 1 tablet (1,000 Units total) by mouth once daily.            ASK your doctor about these medications      * apixaban 5 mg Tab  Commonly known as: ELIQUIS  Take 2 tablets (10 mg total) by mouth 2 (two) times daily. for 2 days  Ask about: Should I take this medication?           * This list has 1 medication(s) that are the same as other medications prescribed for you. Read the directions carefully, and ask your doctor or other care provider to review them with you.                  Indwelling Lines/Drains at time of discharge:   Lines/Drains/Airways       None                   Time spent on the discharge of patient: 45 minutes         Madison Tubbs DO  Department of Hospital Medicine  [facility] - Intensive Care ([place]

## 2023-12-27 ENCOUNTER — TELEPHONE (OUTPATIENT)
Dept: ADMINISTRATIVE | Facility: OTHER | Age: 83
End: 2023-12-27
Payer: MEDICARE

## 2023-12-27 ENCOUNTER — PATIENT MESSAGE (OUTPATIENT)
Dept: ADMINISTRATIVE | Facility: OTHER | Age: 83
End: 2023-12-27
Payer: MEDICARE

## 2023-12-27 NOTE — TELEPHONE ENCOUNTER
Unable to reach patient by phone as reminder for scheduled Functional [facility]oration appointment of 1-4-2024. My Chart message to be sent.

## 2024-01-12 ENCOUNTER — TELEPHONE (OUTPATIENT)
Dept: ADMINISTRATIVE | Facility: OTHER | Age: 84
End: 2024-01-12
Payer: MEDICARE

## 2024-01-15 ENCOUNTER — HOSPITAL ENCOUNTER (EMERGENCY)
Facility: HOSPITAL | Age: 84
Discharge: HOME | End: 2024-01-16
Attending: EMERGENCY MEDICINE
Payer: MEDICARE

## 2024-01-15 DIAGNOSIS — Z86.718 HISTORY OF DVT (DEEP VEIN THROMBOSIS): ICD-10-CM

## 2024-01-15 DIAGNOSIS — M79.89 LEG SWELLING: Primary | ICD-10-CM

## 2024-01-15 DIAGNOSIS — R60.9 EDEMA: ICD-10-CM

## 2024-01-15 LAB
ALBUMIN SERPL BCP-MCNC: 1.9 G/DL (ref 3.5–5.2)
ALP SERPL-CCNC: 82 U/L (ref 55–135)
ALT SERPL W/O P-5'-P-CCNC: 12 U/L (ref 10–44)
ANION GAP SERPL CALC-SCNC: 9 MMOL/L (ref 8–16)
AST SERPL-CCNC: 31 U/L (ref 10–40)
BASOPHILS # BLD AUTO: 0.04 K/UL (ref 0–0.2)
BASOPHILS NFR BLD: 0.6 % (ref 0–1.9)
BILIRUB SERPL-MCNC: 0.3 MG/DL (ref 0.1–1)
BNP SERPL-MCNC: 238 PG/ML (ref 0–99)
BUN SERPL-MCNC: 26 MG/DL (ref 8–23)
CALCIUM SERPL-MCNC: 7.8 MG/DL (ref 8.7–10.5)
CHLORIDE SERPL-SCNC: 105 MMOL/L (ref 95–110)
CO2 SERPL-SCNC: 25 MMOL/L (ref 23–29)
CREAT SERPL-MCNC: 1.1 MG/DL (ref 0.5–1.4)
DIFFERENTIAL METHOD BLD: ABNORMAL
EOSINOPHIL # BLD AUTO: 0.3 K/UL (ref 0–0.5)
EOSINOPHIL NFR BLD: 4.4 % (ref 0–8)
ERYTHROCYTE [DISTWIDTH] IN BLOOD BY AUTOMATED COUNT: 22.5 % (ref 11.5–14.5)
EST. GFR  (NO RACE VARIABLE): 49.9 ML/MIN/1.73 M^2
GLUCOSE SERPL-MCNC: 87 MG/DL (ref 70–110)
HCT VFR BLD AUTO: 28.2 % (ref 37–48.5)
HGB BLD-MCNC: 8.6 G/DL (ref 12–16)
HIV 1+2 AB+HIV1 P24 AG SERPL QL IA: NORMAL
IMM GRANULOCYTES # BLD AUTO: 0.02 K/UL (ref 0–0.04)
IMM GRANULOCYTES NFR BLD AUTO: 0.3 % (ref 0–0.5)
INR PPP: 1.3 (ref 0.8–1.2)
LYMPHOCYTES # BLD AUTO: 2.3 K/UL (ref 1–4.8)
LYMPHOCYTES NFR BLD: 35.1 % (ref 18–48)
MCH RBC QN AUTO: 24.4 PG (ref 27–31)
MCHC RBC AUTO-ENTMCNC: 30.5 G/DL (ref 32–36)
MCV RBC AUTO: 80 FL (ref 82–98)
MONOCYTES # BLD AUTO: 0.6 K/UL (ref 0.3–1)
MONOCYTES NFR BLD: 9.4 % (ref 4–15)
NEUTROPHILS # BLD AUTO: 3.3 K/UL (ref 1.8–7.7)
NEUTROPHILS NFR BLD: 50.2 % (ref 38–73)
NRBC BLD-RTO: 0 /100 WBC
PLATELET # BLD AUTO: 306 K/UL (ref 150–450)
PMV BLD AUTO: 9.5 FL (ref 9.2–12.9)
POTASSIUM SERPL-SCNC: 3.5 MMOL/L (ref 3.5–5.1)
PROT SERPL-MCNC: 6.2 G/DL (ref 6–8.4)
PROTHROMBIN TIME: 13.9 SEC (ref 9–12.5)
RBC # BLD AUTO: 3.53 M/UL (ref 4–5.4)
SODIUM SERPL-SCNC: 139 MMOL/L (ref 136–145)
TROPONIN I SERPL DL<=0.01 NG/ML-MCNC: 0.01 NG/ML (ref 0–0.03)
WBC # BLD AUTO: 6.63 K/UL (ref 3.9–12.7)

## 2024-01-15 PROCEDURE — 85610 PROTHROMBIN TIME: CPT | Performed by: EMERGENCY MEDICINE

## 2024-01-15 PROCEDURE — 99285 EMERGENCY DEPT VISIT HI MDM: CPT | Mod: 25

## 2024-01-15 PROCEDURE — 80053 COMPREHEN METABOLIC PANEL: CPT | Performed by: STUDENT IN AN ORGANIZED HEALTH CARE EDUCATION/TRAINING PROGRAM

## 2024-01-15 PROCEDURE — 84484 ASSAY OF TROPONIN QUANT: CPT | Performed by: STUDENT IN AN ORGANIZED HEALTH CARE EDUCATION/TRAINING PROGRAM

## 2024-01-15 PROCEDURE — 83880 ASSAY OF NATRIURETIC PEPTIDE: CPT | Performed by: STUDENT IN AN ORGANIZED HEALTH CARE EDUCATION/TRAINING PROGRAM

## 2024-01-15 PROCEDURE — 93005 ELECTROCARDIOGRAM TRACING: CPT

## 2024-01-15 PROCEDURE — 87389 HIV-1 AG W/HIV-1&-2 AB AG IA: CPT | Performed by: PHYSICIAN ASSISTANT

## 2024-01-15 PROCEDURE — 93010 ELECTROCARDIOGRAM REPORT: CPT | Mod: ,,, | Performed by: INTERNAL MEDICINE

## 2024-01-15 PROCEDURE — 85025 COMPLETE CBC W/AUTO DIFF WBC: CPT | Performed by: STUDENT IN AN ORGANIZED HEALTH CARE EDUCATION/TRAINING PROGRAM

## 2024-01-15 NOTE — FIRST PROVIDER EVALUATION
Medical screening exam completed.  I have conducted a focused provider triage encounter, findings are as follows:    Brief history of present illness:  Sent here from [facility] in [place] for bilateral lower extremity swelling. Hx of prior DVT on eliquis, HTN. She reports soreness and swelling bilaterally in her legs. No cp, no sob, no fever. Patient unable to tell me when the swelling started.    Vitals:    01/15/24 1552   BP: 100/68   Pulse: 106   Resp: 16   Temp: 99.5 °F (37.5 °C)   SpO2: 99%   Weight: 79.4 kg (175 lb)       Pertinent physical exam:  Awake and alert, no respiratory distress, non-labored breathing, BS equal bilaterally, RRR, b/l lower extremities with mild edema    Brief workup plan:  Labs, CXR, EKG ordered    Preliminary workup initiated; this workup will be continued and followed by the physician or advanced practice provider that is assigned to the patient when roomed.

## 2024-01-16 VITALS
TEMPERATURE: 99 F | DIASTOLIC BLOOD PRESSURE: 63 MMHG | SYSTOLIC BLOOD PRESSURE: 115 MMHG | WEIGHT: 175 LBS | HEART RATE: 99 BPM | BODY MASS INDEX: 29.12 KG/M2 | RESPIRATION RATE: 20 BRPM | OXYGEN SATURATION: 100 %

## 2024-01-16 NOTE — ED TRIAGE NOTES
Patient presents to the ED via EMS from [facility] with complaints of bilateral feet swelling with pitting edema. Patient denies numbness or tingling. Patient denies pain.

## 2024-01-16 NOTE — PROGRESS NOTES
Progress Note    Received patient at signout.    84 y/o F here with b/l LE swelling. Pending US.    -US showing chronic clot, newly partially thrombosed area noted of RLE  -Patient already on eliquis, no acute intervention needed today  -Pt reassessed, she is well appearing. Will DC back to facility, f/u with PCP for recheck/rpt US if needed, strict return precautions given.

## 2024-01-16 NOTE — ED PROVIDER NOTES
Encounter Date: 1/15/2024       History     Chief Complaint   Patient presents with    Edema     HPI patient is an 83-year-old female with a past medical history of DVT, hypertension on Eliquis who was transferred to the emergency department from her nursing facility for increased lower extremity edema.  The patient states that she was brought in from her daughter's house because of lower extremity edema which she states is pretty common for her but her daughter became concerned.  Patient denies any chest pain, no shortness of breath is unaware of a history of DVTs.  The patient is an unreliable historian as she states that the year is currently 2020 and that the month is May because she just had her birthday.  Review of patient's allergies indicates:  No Known Allergies  Past Medical History:   Diagnosis Date    Anticoagulant long-term use      Past Surgical History:   Procedure Laterality Date    COLONOSCOPY N/A 11/22/2021    Procedure: COLONOSCOPY;  Surgeon: Charbel Crooks MD;  Location: [facility] ([address] [place] [address]);  Service: Endoscopy;  Laterality: N/A;    COLONOSCOPY N/A 12/8/2023    Procedure: COLONOSCOPY;  Surgeon: Charbel Crooks MD;  Location: [facility] ([address] [place] [address]);  Service: Endoscopy;  Laterality: N/A;    HYSTERECTOMY       Family History   Problem Relation Age of Onset    Diabetes Mother     Diabetes Father     Cancer Maternal Grandfather      Social History     Tobacco Use    Smoking status: Every Day     Current packs/day: 0.50     Average packs/day: 0.5 packs/day for 66.0 years (33.0 ttl pk-yrs)     Types: Cigarettes    Smokeless tobacco: Never    Tobacco comments:     1 pack every 3 days   Substance Use Topics    Alcohol use: Not Currently     Comment: occasionally    Drug use: Never     Review of Systems  10 point review of systems reviewed with patient otherwise negative.     Physical Exam     Initial Vitals [01/15/24 1552]   BP Pulse Resp Temp SpO2   100/68 106 16 99.5 °F (37.5 °C) 99 %      MAP    "    --         Physical Exam    Nursing note and vitals reviewed.  Constitutional: Patient appears well-developed and well-nourished. No distress.   HENT:   Head: Normocephalic and atraumatic.   Eyes: Conjunctivae and EOM are normal. Pupils are equal, round, and reactive to light.   Neck: Neck supple.   Normal range of motion.  Cardiovascular: Normal rate, regular rhythm, normal heart sounds and intact distal pulses.   Pulmonary/Chest: Breath sounds normal.   Abdominal: Abdomen is soft. Patient exhibits no distension. There is no abdominal tenderness.   Musculoskeletal:      2+ bilateral equal pitting edema  Neurological: Patient is alert and oriented to person, place, and 2020, "May". No cranial nerve deficit.     Skin: Skin is warm and dry.  Psych: Normal mood/affect    ED Course   Procedures  Labs Reviewed   CBC W/ AUTO DIFFERENTIAL - Abnormal; Notable for the following components:       Result Value    RBC 3.53 (*)     Hemoglobin 8.6 (*)     Hematocrit 28.2 (*)     MCV 80 (*)     MCH 24.4 (*)     MCHC 30.5 (*)     RDW 22.5 (*)     All other components within normal limits   COMPREHENSIVE METABOLIC PANEL - Abnormal; Notable for the following components:    BUN 26 (*)     Calcium 7.8 (*)     Albumin 1.9 (*)     eGFR 49.9 (*)     All other components within normal limits   B-TYPE NATRIURETIC PEPTIDE - Abnormal; Notable for the following components:     (*)     All other components within normal limits   PROTIME-INR - Abnormal; Notable for the following components:    Prothrombin Time 13.9 (*)     INR 1.3 (*)     All other components within normal limits   HIV 1 / 2 ANTIBODY    Narrative:     Release to patient->Immediate   TROPONIN I   URINALYSIS, REFLEX TO URINE CULTURE          Imaging Results              US Lower Extremity Veins Bilateral (In process)                      X-Ray Chest AP Portable (Final result)  Result time 01/15/24 19:13:29      Final result by Mike Barcenas MD (01/15/24 19:13:29) "                   Impression:      No convincing radiographic evidence of acute intrathoracic process on this single view.      Electronically signed by: Mike Barcenas MD  Date:    01/15/2024  Time:    19:13               Narrative:    EXAMINATION:  XR CHEST AP PORTABLE    CLINICAL HISTORY:  leg swelling;    TECHNIQUE:  Single frontal view of the chest was performed.    COMPARISON:  Chest radiograph 11/27/2023    FINDINGS:  Cardiac monitoring leads overlie the chest.  The cardiac silhouette is stable in size and configuration.  There is atherosclerotic calcification of the thoracic aorta.  There is mild elevation of the left hemidiaphragm.  Lungs demonstrate mild chronic coarse interstitial attenuation, similar to prior examination.  There is mild bibasilar atelectasis or scarring.  No large confluent airspace consolidation or pleural effusion identified.  No evidence of pneumothorax.  Osseous structures demonstrate degenerative changes.                                       Medications - No data to display  Medical Decision Making  Amount and/or Complexity of Data Reviewed  Labs: ordered.                     I have personally reviewed and interpreted the patients laboratory studies:  , INR 1.3, troponin 0.006  I have personally reviewed and interpreted imaging studies: CXR: . No evidence of consolidation, cardiomegaly, pulmonary edema, pneumothroax    I have personally reviewed and interpreted the patient's EKG:  Normal sinus rhythm at 97 beats per minute, QRS 90, , Q-wave in III, diffusely low voltage similar to previous      I have also reviewed the following:   external records:  Echocardiogram 12/4/23:  LVEF 60 65% with normal diastolic function, bilateral atrial dilation   EKG from 11/2023 with similar low voltage, Q-wave in III    I spoke with the patient's nursing care facility who notes the patient has only complaint was lower extremity edema, no complaints of shortness of breath, no  hypoxia.  Patient is at her mental status baseline.    At the time of sign-out, patient is pending bilateral lower extremity ultrasounds to evaluate for DVTs and anticipate discharge if ultrasound negative      Clinical Impression:  Final diagnoses:  [M79.89] Leg swelling  [R60.9] Edema                 Adalgisa Lee MD  01/15/24 [id number]

## 2024-01-16 NOTE — DISCHARGE INSTRUCTIONS
Your blood work today was normal and the ultrasound of your right leg showed a chronic blood clot with a new small partially clotted area. Continue to take your eliquis and follow-up with your primary care provider in 1-2 weeks to discuss follow-up/repeat ultrasound. You may need an increased dose of your water pill if this is appropriate to help treat the swelling in your ankles.    Return to the ED immediately for any new chest pain, shortness of breath, severe abdominal pain, abdominal pain that is constant, nausea/and vomiting that does not stop on its own, severe headache, new numbness, tingling, or weakness anywhere, fever greater than 101F or any additional concerns.

## 2024-01-16 NOTE — ED NOTES
Received report from RADHA Schulte. Assumed care of patient at this time.  Pt AAOx4, resting comfortably in bed, NAD, respirations E/UL, updated on POC, wheels locked and in low position, call bell with in reach, Comfort positioning and restroom needs were addressed. Necessary items were placed with in reach and was advised when a reassessment would take place.

## 2024-01-17 DIAGNOSIS — Z78.0 MENOPAUSE: ICD-10-CM

## 2024-02-06 ENCOUNTER — LAB VISIT (OUTPATIENT)
Dept: LAB | Facility: HOSPITAL | Age: 84
End: 2024-02-06
Attending: HOSPITALIST
Payer: MEDICARE

## 2024-02-06 ENCOUNTER — OFFICE VISIT (OUTPATIENT)
Dept: INTERNAL MEDICINE | Facility: CLINIC | Age: 84
End: 2024-02-06
Payer: MEDICARE

## 2024-02-06 VITALS
SYSTOLIC BLOOD PRESSURE: 100 MMHG | BODY MASS INDEX: 34.18 KG/M2 | DIASTOLIC BLOOD PRESSURE: 60 MMHG | HEART RATE: 91 BPM | OXYGEN SATURATION: 95 % | HEIGHT: 60 IN | TEMPERATURE: 98 F

## 2024-02-06 DIAGNOSIS — I10 ESSENTIAL HYPERTENSION: ICD-10-CM

## 2024-02-06 DIAGNOSIS — Z87.19 HISTORY OF GI BLEED: ICD-10-CM

## 2024-02-06 DIAGNOSIS — I82.4Y1 ACUTE DEEP VEIN THROMBOSIS (DVT) OF PROXIMAL VEIN OF RIGHT LOWER EXTREMITY: ICD-10-CM

## 2024-02-06 DIAGNOSIS — Z09 HOSPITAL DISCHARGE FOLLOW-UP: Primary | ICD-10-CM

## 2024-02-06 DIAGNOSIS — Z09 HOSPITAL DISCHARGE FOLLOW-UP: ICD-10-CM

## 2024-02-06 DIAGNOSIS — R53.81 DEBILITY: ICD-10-CM

## 2024-02-06 DIAGNOSIS — I82.461 ACUTE DEEP VEIN THROMBOSIS (DVT) OF CALF MUSCLE VEIN OF RIGHT LOWER EXTREMITY: ICD-10-CM

## 2024-02-06 LAB
ANION GAP SERPL CALC-SCNC: 7 MMOL/L (ref 8–16)
BASOPHILS # BLD AUTO: 0.03 K/UL (ref 0–0.2)
BASOPHILS NFR BLD: 0.6 % (ref 0–1.9)
BUN SERPL-MCNC: 18 MG/DL (ref 8–23)
CALCIUM SERPL-MCNC: 7.3 MG/DL (ref 8.7–10.5)
CHLORIDE SERPL-SCNC: 106 MMOL/L (ref 95–110)
CO2 SERPL-SCNC: 24 MMOL/L (ref 23–29)
CREAT SERPL-MCNC: 0.8 MG/DL (ref 0.5–1.4)
DIFFERENTIAL METHOD BLD: ABNORMAL
EOSINOPHIL # BLD AUTO: 0.3 K/UL (ref 0–0.5)
EOSINOPHIL NFR BLD: 4.7 % (ref 0–8)
ERYTHROCYTE [DISTWIDTH] IN BLOOD BY AUTOMATED COUNT: 19.7 % (ref 11.5–14.5)
EST. GFR  (NO RACE VARIABLE): >60 ML/MIN/1.73 M^2
GLUCOSE SERPL-MCNC: 77 MG/DL (ref 70–110)
HCT VFR BLD AUTO: 23.3 % (ref 37–48.5)
HGB BLD-MCNC: 7.1 G/DL (ref 12–16)
IMM GRANULOCYTES # BLD AUTO: 0.01 K/UL (ref 0–0.04)
IMM GRANULOCYTES NFR BLD AUTO: 0.2 % (ref 0–0.5)
LYMPHOCYTES # BLD AUTO: 1.9 K/UL (ref 1–4.8)
LYMPHOCYTES NFR BLD: 35.8 % (ref 18–48)
MCH RBC QN AUTO: 23.8 PG (ref 27–31)
MCHC RBC AUTO-ENTMCNC: 30.5 G/DL (ref 32–36)
MCV RBC AUTO: 78 FL (ref 82–98)
MONOCYTES # BLD AUTO: 0.5 K/UL (ref 0.3–1)
MONOCYTES NFR BLD: 9.2 % (ref 4–15)
NEUTROPHILS # BLD AUTO: 2.6 K/UL (ref 1.8–7.7)
NEUTROPHILS NFR BLD: 49.5 % (ref 38–73)
NRBC BLD-RTO: 0 /100 WBC
PLATELET # BLD AUTO: 265 K/UL (ref 150–450)
PMV BLD AUTO: 9.5 FL (ref 9.2–12.9)
POTASSIUM SERPL-SCNC: 3.8 MMOL/L (ref 3.5–5.1)
RBC # BLD AUTO: 2.98 M/UL (ref 4–5.4)
SODIUM SERPL-SCNC: 137 MMOL/L (ref 136–145)
WBC # BLD AUTO: 5.31 K/UL (ref 3.9–12.7)

## 2024-02-06 PROCEDURE — 85025 COMPLETE CBC W/AUTO DIFF WBC: CPT | Performed by: HOSPITALIST

## 2024-02-06 PROCEDURE — 99213 OFFICE O/P EST LOW 20 MIN: CPT | Mod: PBBFAC,PO | Performed by: HOSPITALIST

## 2024-02-06 PROCEDURE — 99214 OFFICE O/P EST MOD 30 MIN: CPT | Mod: S$PBB,,, | Performed by: HOSPITALIST

## 2024-02-06 PROCEDURE — 80048 BASIC METABOLIC PNL TOTAL CA: CPT | Performed by: HOSPITALIST

## 2024-02-06 PROCEDURE — 36415 COLL VENOUS BLD VENIPUNCTURE: CPT | Mod: PO | Performed by: HOSPITALIST

## 2024-02-06 PROCEDURE — 99999 PR PBB SHADOW E&M-EST. PATIENT-LVL III: CPT | Mod: PBBFAC,,, | Performed by: HOSPITALIST

## 2024-02-06 NOTE — PROGRESS NOTES
"Transitional Care Note    Family and/or Caretaker present at visit?  No.  Diagnostic tests reviewed/disposition: No diagnosic tests pending after this hospitalization.  Disease/illness education: yes  Home health/community services discussion/referrals: Patient does not have home health established from hospital visit.  They do not need home health.  If needed, we will set up home health for the patient.   Establishment or re-establishment of referral orders for community resources: No other necessary community resources.   Discussion with other health care providers: No discussion with other health care providers necessary.   Discharge Medication Review:    Medication reconciliation performed Yes   If no, state reason why not performed: N/A           @Patient ID: Ana Vasquez is a 83 y.o. female.    Chief Complaint: Hospital Follow Up (/)    HPI    83 y.o. female here for hospital f/u. Patient has a past medical history of hypertension, chronic venous insufficiency, chronic left lower leg DVT, on Eliquis. Patient is a poor historian. No family or NH rep at bedside. Pt brought into clinic by EMS on stretcher.     Hospital stay: 11/27 - 12/8/23  pt hospitalized for GI bleed with hemoglobin in the 4.3. she was transfused 2 units of blood per ER note. GI consulted. Initially concern that pt did not have capacity to consent to scope and GI unable to get in touch with the patient's family during hospitalization.  During hospital stay psychiatry was called to to determine if patient had capacity for medical decision-making.  Per DC summary know patient evaluated by psych and was deemed to have capacity.  GI re-consulted however at the time patient was given bowel prep and unable to tolerate.  During hospitalization hemoglobin stable around 8.9 prior to discharge. She was restarted on eliquis as had leg u/s showed: "Continued chronic partial thrombosis of the right common femoral vein and the proximal right femoral " "vein with mural thrombus seen with recanalized central lumen, similar compared to prior. In the right calf, the posterior tibial vein appears partially thrombosed, new compared to prior."     Had ER visit 1/15/24 for leg swelling:     Pt has chronic memory deficit. She is oriented to person, place. But not time; thinks it is May 2020 and that she recently had a birthday. She is a resident of the [facility]. Info obtained from nursing home nurse. She reports she has not followed up with specialists as recommended from hospital discharge.  She denies getting report of blood in the stool.  Reports that patient does have wound care for decubitus ulcer.  She has not currently receiving PT OT and is bed-bound per nursing Facility.  Nurse also confirms that patient has received her flu shot and COVID booster for this flu season. Nurse reported pt also neede        Review of Systems   Unable to perform ROS: Dementia   Eyes:  Negative for pain.   Respiratory:  Negative for shortness of breath.    Cardiovascular:  Positive for leg swelling. Negative for chest pain.   Gastrointestinal:  Negative for abdominal pain.   Endocrine: Negative for polydipsia and polyuria.   Genitourinary:  Negative for difficulty urinating and dysuria.   Musculoskeletal:  Negative for arthralgias and back pain.   Neurological:  Positive for weakness. Negative for dizziness and headaches.   Psychiatric/Behavioral:  Negative for agitation and confusion.      Past medical history, surgical history, and family medical history reviewed and updated as appropriate.    Medications and allergies reviewed.     Objective:     Vitals:    02/06/24 0908   BP: 100/60   Pulse: 91   Temp: 97.7 °F (36.5 °C)   TempSrc: Temporal   SpO2: 95%   Height: 5' (1.524 m)     There is no height or weight on file to calculate BMI.  Physical Exam  Constitutional:       Appearance: Normal appearance.   HENT:      Head: Normocephalic and atraumatic.   Eyes:      " General:         Right eye: No discharge.         Left eye: No discharge.      Conjunctiva/sclera: Conjunctivae normal.   Cardiovascular:      Rate and Rhythm: Normal rate and regular rhythm.      Comments: Difficult to auscultate heart sounds due to body habitus  Pulmonary:      Effort: Pulmonary effort is normal.      Breath sounds: Normal breath sounds.      Comments: On anterior exam  Abdominal:      General: Bowel sounds are normal. There is no distension.      Palpations: Abdomen is soft.      Tenderness: There is no abdominal tenderness.   Musculoskeletal:      Cervical back: Neck supple.      Right lower leg: Edema present.      Left lower leg: Edema present.      Comments: Patient has range of motion of upper extremities.  Unable to lift lower extremity.  Is able to wiggle toes and move feet bilaterally   Skin:     General: Skin is warm and dry.      Comments: +venous stasis changes of BLE    + sacral decubitus ulcer appears to be stage II   Neurological:      Mental Status: She is alert.      Comments: Oriented x 2   Psychiatric:         Mood and Affect: Mood normal.         Behavior: Behavior normal.         Lab Results   Component Value Date    WBC 6.63 01/15/2024    HGB 8.6 (L) 01/15/2024    HCT 28.2 (L) 01/15/2024     01/15/2024    CHOL 111 (L) 11/19/2021    TRIG 105 11/19/2021    HDL 22 (L) 11/19/2021    ALT 12 01/15/2024    AST 31 01/15/2024     01/15/2024    K 3.5 01/15/2024     01/15/2024    CREATININE 1.1 01/15/2024    BUN 26 (H) 01/15/2024    CO2 25 01/15/2024    TSH 1.637 07/01/2020    INR 1.3 (H) 01/15/2024    HGBA1C 4.4 11/19/2021       Assessment:     1. Hospital discharge follow-up    2. History of GI bleed    3. Chronic deep vein thrombosis (DVT) of proximal vein of right lower extremity    4. Acute deep vein thrombosis (DVT) of calf muscle vein of right lower extremity    5. Debility    6. Essential hypertension      Plan:   Ana FINN was seen today for hospital follow  up.    Diagnoses and all orders for this visit:    Hospital discharge follow-up  -     CBC W/ AUTO DIFFERENTIAL; Future  -     BASIC METABOLIC PANEL; Future  -     Ambulatory referral/consult to Gastroenterology; Future  -     Ambulatory referral/consult to Vascular Surgery; Future    History of GI bleed  - unclear source of GI bleed. Referral placed to GI to determine if any need for further intervention. Will also check current blood counts   -     CBC W/ AUTO DIFFERENTIAL; Future  -     BASIC METABOLIC PANEL; Future  -     Ambulatory referral/consult to Gastroenterology; Future  -     Ambulatory referral/consult to Vascular Surgery; Future    Chronic deep vein thrombosis (DVT) of proximal vein of right lower extremity  - stable. On eliquis   -     CBC W/ AUTO DIFFERENTIAL; Future  -     BASIC METABOLIC PANEL; Future    Acute deep vein thrombosis (DVT) of calf muscle vein of right lower extremity  - new. On eliquis. Referral placed to vascular given recent hx of GI bleed   -     Ambulatory referral/consult to Vascular Surgery; Future    Debility  -     CBC W/ AUTO DIFFERENTIAL; Future  -     BASIC METABOLIC PANEL; Future    Essential hypertension  - stable on current meds.   -     CBC W/ AUTO DIFFERENTIAL; Future  -     BASIC METABOLIC PANEL; Future      PT/OT ordered for nursing home     This office note was created using MModal Dictation.  Any errors in spelling or syntax may not have been identified and corrected on initial review prior to signing this note.           Jennifer Schwartz MD  Internal Medicine    2/5/2024

## 2024-02-21 ENCOUNTER — SURGICAL CONSULT (OUTPATIENT)
Dept: VASCULAR SURGERY | Facility: CLINIC | Age: 84
End: 2024-02-21
Payer: MEDICARE

## 2024-02-21 DIAGNOSIS — Z87.19 HISTORY OF GI BLEED: ICD-10-CM

## 2024-02-21 DIAGNOSIS — I82.461 ACUTE DEEP VEIN THROMBOSIS (DVT) OF CALF MUSCLE VEIN OF RIGHT LOWER EXTREMITY: ICD-10-CM

## 2024-02-21 DIAGNOSIS — Z09 HOSPITAL DISCHARGE FOLLOW-UP: ICD-10-CM

## 2024-02-21 PROCEDURE — 99213 OFFICE O/P EST LOW 20 MIN: CPT | Mod: S$PBB,,, | Performed by: SURGERY

## 2024-02-26 ENCOUNTER — TELEPHONE (OUTPATIENT)
Dept: GASTROENTEROLOGY | Facility: CLINIC | Age: 84
End: 2024-02-26
Payer: MEDICARE

## 2024-02-26 NOTE — TELEPHONE ENCOUNTER
Attempted to contact pt to reschedule appt on 3/13/24 due to Irina having an appt on  3/13/24. Left VM requesting call back.

## 2024-03-01 ENCOUNTER — TELEPHONE (OUTPATIENT)
Dept: GASTROENTEROLOGY | Facility: CLINIC | Age: 84
End: 2024-03-01
Payer: MEDICARE

## 2024-03-08 ENCOUNTER — HOSPITAL ENCOUNTER (OUTPATIENT)
Facility: HOSPITAL | Age: 84
Discharge: NURSING, REHAB OR LONG-TERM CARE | End: 2024-03-09
Attending: EMERGENCY MEDICINE | Admitting: STUDENT IN AN ORGANIZED HEALTH CARE EDUCATION/TRAINING PROGRAM
Payer: MEDICARE

## 2024-03-08 DIAGNOSIS — D63.8 ANEMIA OF CHRONIC DISEASE: ICD-10-CM

## 2024-03-08 DIAGNOSIS — R60.0 BILATERAL LOWER EXTREMITY EDEMA: ICD-10-CM

## 2024-03-08 DIAGNOSIS — R07.9 CHEST PAIN: ICD-10-CM

## 2024-03-08 DIAGNOSIS — D64.9 SYMPTOMATIC ANEMIA: Primary | ICD-10-CM

## 2024-03-08 DIAGNOSIS — R06.02 SHORTNESS OF BREATH: ICD-10-CM

## 2024-03-08 LAB
ABO + RH BLD: NORMAL
ALBUMIN SERPL BCP-MCNC: 1.6 G/DL (ref 3.5–5.2)
ALP SERPL-CCNC: 87 U/L (ref 55–135)
ALT SERPL W/O P-5'-P-CCNC: 13 U/L (ref 10–44)
ANION GAP SERPL CALC-SCNC: 10 MMOL/L (ref 8–16)
AST SERPL-CCNC: 30 U/L (ref 10–40)
BASOPHILS # BLD AUTO: 0.04 K/UL (ref 0–0.2)
BASOPHILS NFR BLD: 0.5 % (ref 0–1.9)
BILIRUB SERPL-MCNC: 0.3 MG/DL (ref 0.1–1)
BLD GP AB SCN CELLS X3 SERPL QL: NORMAL
BLD PROD TYP BPU: NORMAL
BLOOD UNIT EXPIRATION DATE: NORMAL
BLOOD UNIT TYPE CODE: 6200
BLOOD UNIT TYPE: NORMAL
BNP SERPL-MCNC: 283 PG/ML (ref 0–99)
BUN SERPL-MCNC: 22 MG/DL (ref 8–23)
CALCIUM SERPL-MCNC: 7.3 MG/DL (ref 8.7–10.5)
CHLORIDE SERPL-SCNC: 103 MMOL/L (ref 95–110)
CO2 SERPL-SCNC: 27 MMOL/L (ref 23–29)
CODING SYSTEM: NORMAL
CREAT SERPL-MCNC: 1.1 MG/DL (ref 0.5–1.4)
CROSSMATCH INTERPRETATION: NORMAL
DIFFERENTIAL METHOD BLD: ABNORMAL
DISPENSE STATUS: NORMAL
EOSINOPHIL # BLD AUTO: 0.3 K/UL (ref 0–0.5)
EOSINOPHIL NFR BLD: 3.1 % (ref 0–8)
ERYTHROCYTE [DISTWIDTH] IN BLOOD BY AUTOMATED COUNT: 18.6 % (ref 11.5–14.5)
EST. GFR  (NO RACE VARIABLE): 49.9 ML/MIN/1.73 M^2
GLUCOSE SERPL-MCNC: 86 MG/DL (ref 70–110)
HCT VFR BLD AUTO: 22.4 % (ref 37–48.5)
HGB BLD-MCNC: 6.7 G/DL (ref 12–16)
IMM GRANULOCYTES # BLD AUTO: 0.03 K/UL (ref 0–0.04)
IMM GRANULOCYTES NFR BLD AUTO: 0.4 % (ref 0–0.5)
LACTATE SERPL-SCNC: 1.8 MMOL/L (ref 0.5–2.2)
LYMPHOCYTES # BLD AUTO: 2.4 K/UL (ref 1–4.8)
LYMPHOCYTES NFR BLD: 28.6 % (ref 18–48)
MCH RBC QN AUTO: 21.8 PG (ref 27–31)
MCHC RBC AUTO-ENTMCNC: 29.9 G/DL (ref 32–36)
MCV RBC AUTO: 73 FL (ref 82–98)
MONOCYTES # BLD AUTO: 0.7 K/UL (ref 0.3–1)
MONOCYTES NFR BLD: 7.9 % (ref 4–15)
NEUTROPHILS # BLD AUTO: 5 K/UL (ref 1.8–7.7)
NEUTROPHILS NFR BLD: 59.5 % (ref 38–73)
NRBC BLD-RTO: 0 /100 WBC
NUM UNITS TRANS PACKED RBC: NORMAL
PLATELET # BLD AUTO: 347 K/UL (ref 150–450)
PMV BLD AUTO: 9.3 FL (ref 9.2–12.9)
POTASSIUM SERPL-SCNC: 3.5 MMOL/L (ref 3.5–5.1)
PROT SERPL-MCNC: 5.9 G/DL (ref 6–8.4)
RBC # BLD AUTO: 3.07 M/UL (ref 4–5.4)
SODIUM SERPL-SCNC: 140 MMOL/L (ref 136–145)
SPECIMEN OUTDATE: NORMAL
TROPONIN I SERPL DL<=0.01 NG/ML-MCNC: 0.01 NG/ML (ref 0–0.03)
WBC # BLD AUTO: 8.38 K/UL (ref 3.9–12.7)

## 2024-03-08 PROCEDURE — 84484 ASSAY OF TROPONIN QUANT: CPT | Performed by: EMERGENCY MEDICINE

## 2024-03-08 PROCEDURE — 83880 ASSAY OF NATRIURETIC PEPTIDE: CPT | Performed by: EMERGENCY MEDICINE

## 2024-03-08 PROCEDURE — 85025 COMPLETE CBC W/AUTO DIFF WBC: CPT | Performed by: EMERGENCY MEDICINE

## 2024-03-08 PROCEDURE — 86901 BLOOD TYPING SEROLOGIC RH(D): CPT | Performed by: EMERGENCY MEDICINE

## 2024-03-08 PROCEDURE — 99285 EMERGENCY DEPT VISIT HI MDM: CPT | Mod: 25

## 2024-03-08 PROCEDURE — P9016 RBC LEUKOCYTES REDUCED: HCPCS | Performed by: EMERGENCY MEDICINE

## 2024-03-08 PROCEDURE — 86920 COMPATIBILITY TEST SPIN: CPT | Performed by: EMERGENCY MEDICINE

## 2024-03-08 PROCEDURE — 82272 OCCULT BLD FECES 1-3 TESTS: CPT

## 2024-03-08 PROCEDURE — 36430 TRANSFUSION BLD/BLD COMPNT: CPT

## 2024-03-08 PROCEDURE — 94761 N-INVAS EAR/PLS OXIMETRY MLT: CPT

## 2024-03-08 PROCEDURE — G0378 HOSPITAL OBSERVATION PER HR: HCPCS

## 2024-03-08 PROCEDURE — 80053 COMPREHEN METABOLIC PANEL: CPT | Performed by: EMERGENCY MEDICINE

## 2024-03-08 PROCEDURE — 93010 ELECTROCARDIOGRAM REPORT: CPT | Mod: ,,, | Performed by: INTERNAL MEDICINE

## 2024-03-08 PROCEDURE — 83605 ASSAY OF LACTIC ACID: CPT

## 2024-03-08 PROCEDURE — 93005 ELECTROCARDIOGRAM TRACING: CPT

## 2024-03-08 PROCEDURE — 25000003 PHARM REV CODE 250: Performed by: EMERGENCY MEDICINE

## 2024-03-08 RX ORDER — IBUPROFEN 200 MG
16 TABLET ORAL
Status: DISCONTINUED | OUTPATIENT
Start: 2024-03-08 | End: 2024-03-09 | Stop reason: HOSPADM

## 2024-03-08 RX ORDER — GLUCAGON 1 MG
1 KIT INJECTION
Status: DISCONTINUED | OUTPATIENT
Start: 2024-03-08 | End: 2024-03-09 | Stop reason: HOSPADM

## 2024-03-08 RX ORDER — PANTOPRAZOLE SODIUM 40 MG/1
40 TABLET, DELAYED RELEASE ORAL DAILY
Status: DISCONTINUED | OUTPATIENT
Start: 2024-03-09 | End: 2024-03-09 | Stop reason: HOSPADM

## 2024-03-08 RX ORDER — POLYETHYLENE GLYCOL 3350 17 G/17G
17 POWDER, FOR SOLUTION ORAL 2 TIMES DAILY PRN
Status: DISCONTINUED | OUTPATIENT
Start: 2024-03-08 | End: 2024-03-09 | Stop reason: HOSPADM

## 2024-03-08 RX ORDER — ONDANSETRON 8 MG/1
8 TABLET, ORALLY DISINTEGRATING ORAL EVERY 8 HOURS PRN
Status: DISCONTINUED | OUTPATIENT
Start: 2024-03-08 | End: 2024-03-09 | Stop reason: HOSPADM

## 2024-03-08 RX ORDER — TALC
6 POWDER (GRAM) TOPICAL NIGHTLY PRN
Status: DISCONTINUED | OUTPATIENT
Start: 2024-03-08 | End: 2024-03-09 | Stop reason: HOSPADM

## 2024-03-08 RX ORDER — TALC
6 POWDER (GRAM) TOPICAL NIGHTLY PRN
Status: DISCONTINUED | OUTPATIENT
Start: 2024-03-08 | End: 2024-03-08

## 2024-03-08 RX ORDER — ACETAMINOPHEN 500 MG
1000 TABLET ORAL EVERY 8 HOURS PRN
Status: DISCONTINUED | OUTPATIENT
Start: 2024-03-08 | End: 2024-03-09 | Stop reason: HOSPADM

## 2024-03-08 RX ORDER — SODIUM CHLORIDE 0.9 % (FLUSH) 0.9 %
5 SYRINGE (ML) INJECTION
Status: DISCONTINUED | OUTPATIENT
Start: 2024-03-08 | End: 2024-03-09 | Stop reason: HOSPADM

## 2024-03-08 RX ORDER — IBUPROFEN 200 MG
24 TABLET ORAL
Status: DISCONTINUED | OUTPATIENT
Start: 2024-03-08 | End: 2024-03-09 | Stop reason: HOSPADM

## 2024-03-08 RX ORDER — SIMETHICONE 80 MG
1 TABLET,CHEWABLE ORAL 4 TIMES DAILY PRN
Status: DISCONTINUED | OUTPATIENT
Start: 2024-03-08 | End: 2024-03-09 | Stop reason: HOSPADM

## 2024-03-08 RX ORDER — HYDROCODONE BITARTRATE AND ACETAMINOPHEN 500; 5 MG/1; MG/1
TABLET ORAL
Status: DISCONTINUED | OUTPATIENT
Start: 2024-03-08 | End: 2024-03-09 | Stop reason: HOSPADM

## 2024-03-08 RX ORDER — ALUMINUM HYDROXIDE, MAGNESIUM HYDROXIDE, AND SIMETHICONE 1200; 120; 1200 MG/30ML; MG/30ML; MG/30ML
30 SUSPENSION ORAL 4 TIMES DAILY PRN
Status: DISCONTINUED | OUTPATIENT
Start: 2024-03-08 | End: 2024-03-09 | Stop reason: HOSPADM

## 2024-03-08 RX ORDER — BISACODYL 10 MG/1
10 SUPPOSITORY RECTAL DAILY PRN
Status: DISCONTINUED | OUTPATIENT
Start: 2024-03-08 | End: 2024-03-09 | Stop reason: HOSPADM

## 2024-03-08 RX ORDER — SODIUM CHLORIDE 0.9 % (FLUSH) 0.9 %
10 SYRINGE (ML) INJECTION
Status: DISCONTINUED | OUTPATIENT
Start: 2024-03-08 | End: 2024-03-09 | Stop reason: HOSPADM

## 2024-03-08 RX ORDER — FUROSEMIDE 20 MG/1
20 TABLET ORAL DAILY
Status: DISCONTINUED | OUTPATIENT
Start: 2024-03-09 | End: 2024-03-09 | Stop reason: HOSPADM

## 2024-03-08 RX ORDER — ACETAMINOPHEN 325 MG/1
650 TABLET ORAL EVERY 4 HOURS PRN
Status: DISCONTINUED | OUTPATIENT
Start: 2024-03-08 | End: 2024-03-09 | Stop reason: HOSPADM

## 2024-03-08 RX ORDER — NALOXONE HCL 0.4 MG/ML
0.02 VIAL (ML) INJECTION
Status: DISCONTINUED | OUTPATIENT
Start: 2024-03-08 | End: 2024-03-09 | Stop reason: HOSPADM

## 2024-03-08 RX ORDER — CHOLECALCIFEROL (VITAMIN D3) 25 MCG
1000 TABLET ORAL DAILY
Status: DISCONTINUED | OUTPATIENT
Start: 2024-03-09 | End: 2024-03-09 | Stop reason: HOSPADM

## 2024-03-08 RX ORDER — FUROSEMIDE 40 MG/1
40 TABLET ORAL
Status: COMPLETED | OUTPATIENT
Start: 2024-03-08 | End: 2024-03-08

## 2024-03-08 RX ORDER — IPRATROPIUM BROMIDE AND ALBUTEROL SULFATE 2.5; .5 MG/3ML; MG/3ML
3 SOLUTION RESPIRATORY (INHALATION) EVERY 4 HOURS PRN
Status: DISCONTINUED | OUTPATIENT
Start: 2024-03-08 | End: 2024-03-09 | Stop reason: HOSPADM

## 2024-03-08 RX ADMIN — FUROSEMIDE 40 MG: 40 TABLET ORAL at 05:03

## 2024-03-08 NOTE — ED NOTES
Spoke to pt's daughter Brianna. States pt is scared of needles, Will call her back once pt back from [place]

## 2024-03-08 NOTE — ED NOTES
Pt refusing blood work and PIV placement + arguing with staff. Sessions, MD messaged and made aware.

## 2024-03-08 NOTE — FIRST PROVIDER EVALUATION
Medical screening examination initiated.  I have conducted a focused provider triage encounter, findings are as follows:    Brief history of present illness:  82 yo f w/ h/o HFpEF and chronic R femoral vein DVT presents for BLE swelling. She denies hemoptysis, SOB, or orthopnes    Vitals:    03/08/24 1203   BP: (!) 92/52   Pulse: 110   Resp: 18   Temp: 98.2 °F (36.8 °C)   TempSrc: Oral   SpO2: 99%   Weight: 79.4 kg (175 lb)       Pertinent physical exam:  3+ PITTING EDEMA B FEET. Venous stasis skin changes to B ankles    Brief workup plan:  bnp, us r/o dvt BLE.    Preliminary workup initiated; this workup will be continued and followed by the physician or advanced practice provider that is assigned to the patient when roomed.

## 2024-03-08 NOTE — ED PROVIDER NOTES
"Encounter Date: 3/8/2024       History     Chief Complaint   Patient presents with    Leg Swelling     Pt presents to ED via EMS from [facility] w/ c/o bilateral LE edema. EMS reports BLE at baseline; according to patient, "it is worse than normal."     84 yo F presents w/ BLE edema. Apparently worse from previous. Patient does have a past h/o BLE edema and chronic right femoral vein thrombosis. She does take apixiban. She denies sob, hemoptysis, orthopnea. She denies fever or chills.      Review of patient's allergies indicates:  No Known Allergies  Past Medical History:   Diagnosis Date    Anticoagulant long-term use      Past Surgical History:   Procedure Laterality Date    COLONOSCOPY N/A 11/22/2021    Procedure: COLONOSCOPY;  Surgeon: Charbel Crooks MD;  Location: [facility] ENDO ([address] [place] [address]);  Service: Endoscopy;  Laterality: N/A;    COLONOSCOPY N/A 12/8/2023    Procedure: COLONOSCOPY;  Surgeon: Charbel Crooks MD;  Location: [facility] ENDO ([address] [place] [address]);  Service: Endoscopy;  Laterality: N/A;    HYSTERECTOMY       Family History   Problem Relation Age of Onset    Diabetes Mother     Diabetes Father     Cancer Maternal Grandfather      Social History     Tobacco Use    Smoking status: Former     Current packs/day: 0.50     Average packs/day: 0.5 packs/day for 66.0 years (33.0 ttl pk-yrs)     Types: Cigarettes    Smokeless tobacco: Never    Tobacco comments:     1 pack every 3 days   Substance Use Topics    Alcohol use: Not Currently     Comment: occasionally    Drug use: Never     Review of Systems  All other systems reviewed and negative except as noted in HPI    Physical Exam     Initial Vitals [03/08/24 1203]   BP Pulse Resp Temp SpO2   (!) 92/52 110 18 98.2 °F (36.8 °C) 99 %      MAP       --         Physical Exam    Nursing note and vitals reviewed.  Constitutional: She appears well-developed and well-nourished.   HENT:   Head: Normocephalic and atraumatic.   Eyes: Conjunctivae and EOM are normal. Pupils are " equal, round, and reactive to light.   Neck: Neck supple.   Normal range of motion.  Cardiovascular:  Normal rate, regular rhythm, normal heart sounds and intact distal pulses.           Pulmonary/Chest: Breath sounds normal. No respiratory distress.   Abdominal: Abdomen is soft. She exhibits no distension.   Musculoskeletal:         General: Edema (3+ pitting to bilateral feet.) present. No tenderness.      Cervical back: Normal range of motion and neck supple.     Neurological: She is alert. No cranial nerve deficit. GCS score is 15. GCS eye subscore is 4. GCS verbal subscore is 5. GCS motor subscore is 6.   Skin: Capillary refill takes less than 2 seconds. There is erythema (to bilateral ankles).         ED Course   Procedures  Labs Reviewed   CBC W/ AUTO DIFFERENTIAL - Abnormal; Notable for the following components:       Result Value    RBC 3.07 (*)     Hemoglobin 6.7 (*)     Hematocrit 22.4 (*)     MCV 73 (*)     MCH 21.8 (*)     MCHC 29.9 (*)     RDW 18.6 (*)     All other components within normal limits   COMPREHENSIVE METABOLIC PANEL - Abnormal; Notable for the following components:    Calcium 7.3 (*)     Total Protein 5.9 (*)     Albumin 1.6 (*)     eGFR 49.9 (*)     All other components within normal limits   B-TYPE NATRIURETIC PEPTIDE - Abnormal; Notable for the following components:     (*)     All other components within normal limits   TROPONIN I   TYPE & SCREEN   PREPARE RBC SOFT     EKG Readings: (Independently Interpreted)   Initial Reading: No STEMI. Previous EKG: Compared with most recent EKG Previous EKG Date: 1/15/24. Rhythm: Normal Sinus Rhythm. Heart Rate: 94.   Nonspecific T wave flattening, worse laterally       Imaging Results              US Lower Extremity Veins Bilateral (Final result)  Result time 03/08/24 19:08:20      Final result by Dany Nichols MD (03/08/24 19:08:20)                   Impression:      Prior right lower extremity nonocclusive venous thrombus is no longer  visualized.  No evidence of deep venous thrombosis in either lower extremity.    Electronically signed by resident: Juwan Avalos  Date:    03/08/2024  Time:    19:01    Electronically signed by: Dany Nichols MD  Date:    03/08/2024  Time:    19:08               Narrative:    EXAMINATION:  US LOWER EXTREMITY VEINS BILATERAL    CLINICAL HISTORY:  Acute embolism and thrombosis of right femoral vein    TECHNIQUE:  Duplex and color flow Doppler and dynamic compression was performed of the bilateral lower extremity veins was performed.    COMPARISON:  Ultrasound lower extremity veins 01/15/2024    FINDINGS:  Right thigh veins: The common femoral, femoral, popliteal, and upper greater saphenous veins are patent and free of thrombus. The veins are normally compressible and have normal phasic flow and augmentation response.  Note made of prior exam demonstrating right common and superficial femoral nonocclusive thrombus.    Right calf veins: The visualized calf veins are patent.  Note made of prior exam demonstrating posterior tibial vein nonocclusive thrombus.    Left thigh veins: The common femoral, femoral, popliteal, and upper greater saphenous veins are patent and free of thrombus. The veins are normally compressible and have normal phasic flow and augmentation response.    Left calf veins: The visualized calf veins are patent.    Miscellaneous: Bilateral subcutaneous edema.                                       X-Ray Chest AP Portable (Final result)  Result time 03/08/24 16:28:18      Final result by Li Benjamin MD (03/08/24 16:28:18)                   Impression:      Minimal subsegmental atelectasis left lung base, similar to 01/15/2024 exam.      Electronically signed by: Li Benjamin MD  Date:    03/08/2024  Time:    16:28               Narrative:    EXAMINATION:  XR CHEST AP PORTABLE    CLINICAL HISTORY:  CHF;    TECHNIQUE:  Single frontal view of the chest was  performed.    COMPARISON:  01/15/2024    FINDINGS:  The cardiac silhouette is midline.  The pulmonary vascularity is normal.    Subsegmental atelectasis at the left lung base.    No focal airspace disease.    No pleural effusion.  No pneumothorax.    The osseous structures appear within normal limits.                                    X-Rays:   Independently Interpreted Readings:   Other Readings:  CXR - no consolidation, effusion    Medications   0.9%  NaCl infusion (for blood administration) (has no administration in time range)   furosemide tablet 40 mg (40 mg Oral Given 3/8/24 1732)     Medical Decision Making  Examination is consistent with chronic venous stasis and known bilateral lower extremity edema.  Worsening of uncertain chronicity.  Will recheck BNP.  Will recheck bilateral lower extremity ultrasound for DVT.    Amount and/or Complexity of Data Reviewed  External Data Reviewed: labs, radiology, ECG and notes.     Details: Ultrasound on 01/15/2024 did show chronic partial occlusive thrombus of right common femoral vein   Documentation from nursing home does show compliance with apixaban therapy.  Labs: ordered. Decision-making details documented in ED Course.  Radiology: ordered. Decision-making details documented in ED Course.  ECG/medicine tests: ordered and independent interpretation performed. Decision-making details documented in ED Course.  Discussion of management or test interpretation with external provider(s): D/w hospital medicine for admission d/t symptomatic anemia    Risk  Prescription drug management.               ED Course as of 03/08/24 2053   Fri Mar 08, 2024   1319 BP c/w previous trend [DS]   1650 Patient refusing most interventions.  I do not see a medical power of  on file despite review of the last several months.  Patient's last consent to treatment form from her last visit shows her signature.  She does not want any lab assays or interventions.  I will give her some  oral furosemide as bedside ultrasound is not clearly consistent with by our DVT and she does have some B-lines on lung ultrasound [DS]   1735 X-Ray Chest AP Portable  CXR w/ mild L atelectasis similar to previous. [DS]   1914 Prior RLE DVT no longer present [DS]   1952 Troponin I [DS]   1952 Comprehensive metabolic panel(!) [DS]   1952 BNP stable. H/h continues to down trend despite lack of c/o melena, hematochezia, hematemesis [DS]      ED Course User Index  [DS] Sessions, Angel IRIZARRY MD                           Clinical Impression:  Final diagnoses:  [R06.02] Shortness of breath  [D64.9] Symptomatic anemia (Primary)  [R60.0] Bilateral lower extremity edema          ED Disposition Condition    Observation Stable                Sessions, Angel IRIZARRY MD  03/08/24 2053

## 2024-03-08 NOTE — ED NOTES
"Ana Vasquez, a 83 y.o. female presents to the ED w/ complaint of  pt states "not sure, my daughter called the ambulance." EMS brought pt from [facility] with BLE swelling. Pt has+4 pitting edema.    Triage note:  Chief Complaint   Patient presents with    Leg Swelling     Pt presents to ED via EMS from [facility] w/ c/o bilateral LE edema. EMS reports BLE at baseline; according to patient, "it is worse than normal."     Review of patient's allergies indicates:  No Known Allergies  Past Medical History:   Diagnosis Date    Anticoagulant long-term use      Patient identifiers verified and correct for   LOC: The patient is awake, alert and aware of environment with an appropriate affect, the patient is oriented x 1, her baseline, and speaking appropriately.   APPEARANCE: Patient appears comfortable and in no acute distress, patient is clean and well groomed.  SKIN: The skin is warm and dry, color consistent with ethnicity, patient has normal skin turgor and moist mucus membranes, skin intact, no breakdown or bruising noted. +4 Pitting edema to BLE.   MUSCULOSKELETAL: Patient moving all extremities spontaneously, swelling noted to BLE.  RESPIRATORY: Airway is open and patent, respirations are spontaneous, patient has a normal effort and rate, no accessory muscle use noted, pt placed on continuous pulse ox with O2 sats noted at 97% on room air.  CARDIAC: Edema noted to BLE, capillary refill < 3 seconds.   GASTRO: Soft and non tender to palpation, no distention noted, normoactive bowel sounds present in all four quadrants. Pt states bowel movements have been regular.  : Pt denies any pain or frequency with urination. Pt has occasional incontinence.  NEURO: Pt opens eyes spontaneously, behavior appropriate to situation, follows commands, facial expression symmetrical, bilateral hand grasp equal and even, purposeful motor response noted, normal sensation in all extremities when touched with a finger. HX: " dementia.

## 2024-03-09 VITALS
OXYGEN SATURATION: 99 % | DIASTOLIC BLOOD PRESSURE: 54 MMHG | SYSTOLIC BLOOD PRESSURE: 98 MMHG | BODY MASS INDEX: 34.18 KG/M2 | WEIGHT: 175 LBS | RESPIRATION RATE: 18 BRPM | HEART RATE: 86 BPM | TEMPERATURE: 98 F

## 2024-03-09 LAB
ANION GAP SERPL CALC-SCNC: 9 MMOL/L (ref 8–16)
BASOPHILS # BLD AUTO: 0.04 K/UL (ref 0–0.2)
BASOPHILS NFR BLD: 0.6 % (ref 0–1.9)
BUN SERPL-MCNC: 22 MG/DL (ref 8–23)
CALCIUM SERPL-MCNC: 7 MG/DL (ref 8.7–10.5)
CHLORIDE SERPL-SCNC: 103 MMOL/L (ref 95–110)
CO2 SERPL-SCNC: 27 MMOL/L (ref 23–29)
CREAT SERPL-MCNC: 0.9 MG/DL (ref 0.5–1.4)
CRP SERPL-MCNC: 5.88 MG/L (ref 0–3.19)
DIFFERENTIAL METHOD BLD: ABNORMAL
EOSINOPHIL # BLD AUTO: 0.3 K/UL (ref 0–0.5)
EOSINOPHIL NFR BLD: 4 % (ref 0–8)
ERYTHROCYTE [DISTWIDTH] IN BLOOD BY AUTOMATED COUNT: 19.3 % (ref 11.5–14.5)
ERYTHROCYTE [SEDIMENTATION RATE] IN BLOOD BY PHOTOMETRIC METHOD: 47 MM/HR (ref 0–36)
EST. GFR  (NO RACE VARIABLE): >60 ML/MIN/1.73 M^2
FERRITIN SERPL-MCNC: 14 NG/ML (ref 20–300)
GLUCOSE SERPL-MCNC: 89 MG/DL (ref 70–110)
HCT VFR BLD AUTO: 24.2 % (ref 37–48.5)
HGB BLD-MCNC: 7.5 G/DL (ref 12–16)
HGB BLD-MCNC: 7.5 G/DL (ref 12–16)
IMM GRANULOCYTES # BLD AUTO: 0.02 K/UL (ref 0–0.04)
IMM GRANULOCYTES NFR BLD AUTO: 0.3 % (ref 0–0.5)
IRON SERPL-MCNC: 56 UG/DL (ref 30–160)
LYMPHOCYTES # BLD AUTO: 1.9 K/UL (ref 1–4.8)
LYMPHOCYTES NFR BLD: 26.3 % (ref 18–48)
MAGNESIUM SERPL-MCNC: 1.2 MG/DL (ref 1.6–2.6)
MCH RBC QN AUTO: 23 PG (ref 27–31)
MCHC RBC AUTO-ENTMCNC: 31 G/DL (ref 32–36)
MCV RBC AUTO: 74 FL (ref 82–98)
MONOCYTES # BLD AUTO: 0.6 K/UL (ref 0.3–1)
MONOCYTES NFR BLD: 8.8 % (ref 4–15)
NEUTROPHILS # BLD AUTO: 4.3 K/UL (ref 1.8–7.7)
NEUTROPHILS NFR BLD: 60 % (ref 38–73)
NRBC BLD-RTO: 0 /100 WBC
OB PNL STL: POSITIVE
OHS QRS DURATION: 80 MS
OHS QTC CALCULATION: 450 MS
PLATELET # BLD AUTO: 270 K/UL (ref 150–450)
PMV BLD AUTO: 8.9 FL (ref 9.2–12.9)
POTASSIUM SERPL-SCNC: 3.1 MMOL/L (ref 3.5–5.1)
RBC # BLD AUTO: 3.26 M/UL (ref 4–5.4)
SATURATED IRON: 23 % (ref 20–50)
SODIUM SERPL-SCNC: 139 MMOL/L (ref 136–145)
TOTAL IRON BINDING CAPACITY: 241 UG/DL (ref 250–450)
TRANSFERRIN SERPL-MCNC: 163 MG/DL (ref 200–375)
WBC # BLD AUTO: 7.18 K/UL (ref 3.9–12.7)

## 2024-03-09 PROCEDURE — 85025 COMPLETE CBC W/AUTO DIFF WBC: CPT | Performed by: STUDENT IN AN ORGANIZED HEALTH CARE EDUCATION/TRAINING PROGRAM

## 2024-03-09 PROCEDURE — 25000003 PHARM REV CODE 250

## 2024-03-09 PROCEDURE — 83540 ASSAY OF IRON: CPT | Performed by: STUDENT IN AN ORGANIZED HEALTH CARE EDUCATION/TRAINING PROGRAM

## 2024-03-09 PROCEDURE — 85652 RBC SED RATE AUTOMATED: CPT | Performed by: STUDENT IN AN ORGANIZED HEALTH CARE EDUCATION/TRAINING PROGRAM

## 2024-03-09 PROCEDURE — 86141 C-REACTIVE PROTEIN HS: CPT | Performed by: STUDENT IN AN ORGANIZED HEALTH CARE EDUCATION/TRAINING PROGRAM

## 2024-03-09 PROCEDURE — 82728 ASSAY OF FERRITIN: CPT | Performed by: STUDENT IN AN ORGANIZED HEALTH CARE EDUCATION/TRAINING PROGRAM

## 2024-03-09 PROCEDURE — 25000003 PHARM REV CODE 250: Performed by: STUDENT IN AN ORGANIZED HEALTH CARE EDUCATION/TRAINING PROGRAM

## 2024-03-09 PROCEDURE — 80048 BASIC METABOLIC PNL TOTAL CA: CPT | Performed by: STUDENT IN AN ORGANIZED HEALTH CARE EDUCATION/TRAINING PROGRAM

## 2024-03-09 PROCEDURE — G0378 HOSPITAL OBSERVATION PER HR: HCPCS

## 2024-03-09 PROCEDURE — 83735 ASSAY OF MAGNESIUM: CPT | Performed by: STUDENT IN AN ORGANIZED HEALTH CARE EDUCATION/TRAINING PROGRAM

## 2024-03-09 RX ORDER — POTASSIUM CHLORIDE 20 MEQ/1
40 TABLET, EXTENDED RELEASE ORAL ONCE
Status: COMPLETED | OUTPATIENT
Start: 2024-03-09 | End: 2024-03-09

## 2024-03-09 RX ORDER — LANOLIN ALCOHOL/MO/W.PET/CERES
400 CREAM (GRAM) TOPICAL ONCE
Status: COMPLETED | OUTPATIENT
Start: 2024-03-09 | End: 2024-03-09

## 2024-03-09 RX ADMIN — FUROSEMIDE 20 MG: 20 TABLET ORAL at 08:03

## 2024-03-09 RX ADMIN — POTASSIUM CHLORIDE 40 MEQ: 1500 TABLET, EXTENDED RELEASE ORAL at 11:03

## 2024-03-09 RX ADMIN — Medication 400 MG: at 11:03

## 2024-03-09 RX ADMIN — PANTOPRAZOLE SODIUM 40 MG: 40 TABLET, DELAYED RELEASE ORAL at 08:03

## 2024-03-09 RX ADMIN — CHOLECALCIFEROL TAB 25 MCG (1000 UNIT) 1000 UNITS: 25 TAB at 08:03

## 2024-03-09 NOTE — PLAN OF CARE
Discharge Planning Assessment:  Patient admitted on: 3-8-24  Chart reviewed, Care plan discussed with treatment team  PCP updated in Epic: at NH    Current Dispo: return to [facility] today  Spoke with family, provided updates  Transportation: non emergent amb  Case management to follow for plans and arrangements as needed

## 2024-03-09 NOTE — ED TRIAGE NOTES
"Ana Vasquez, a 83 y.o. female presents to the ED w/ complaint of edema of BLE.  Patient comes from [facility] for edema of BLE gradually up to thighs and abdomen     Triage note:  Chief Complaint   Patient presents with    Leg Swelling     Pt presents to ED via EMS from [facility] w/ c/o bilateral LE edema. EMS reports BLE at baseline; according to patient, "it is worse than normal."     Review of patient's allergies indicates:  No Known Allergies  Past Medical History:   Diagnosis Date    Anticoagulant long-term use        "

## 2024-03-09 NOTE — NURSING
Report called to RADHA Hooks at [facility], transporting via [facility], Brianna (daughter) notified of discharge, pt agreeable to plan, no complaints at this time.

## 2024-03-09 NOTE — HPI
Ana Vasquez is a 83 y.o. female with history of chronic DVT on eliquis, dementia, presenting from [facility] for BLE swelling. Patient reports she has chronic BLE swelling and usually wraps her legs in ace bandages. The legs are tender to touch bilaterally and chronically.  She denies any new skin change to her legs. Denies fever, chills, shortness of breath, chest pain, RAMACHANDRAN, PND, palpitations. She denies known bloody bowel movements. She states she really had no complaints and they are daughter reportedly saw her legs and wanted them checked out.    In the ED: Afebrile. . BP 92/52. Hbg 6.7. albumin 1.6.  (basleine). Troponin WNL. Lower extremity US without DVT. CXR Minimal subsegmental atelectasis left lung base, similar to 01/15/2024 exam. Given lasix PO in the ED and 1 unit pRBC ordered. Admitted to .

## 2024-03-09 NOTE — DISCHARGE SUMMARY
[facility] - Emergency Dept  [facility] Medicine  Discharge Summary      Patient Name: Ana Vasquez  MRN: [id number]  ELMA: [id number]  Patient Class: OP- Observation  Admission Date: 3/8/2024  Hospital Length of Stay: 0 days  Discharge Date and Time:  03/09/2024 11:06 AM  Attending Physician: Donal Mason MD   Discharging Provider: Donal Mason MD  Primary Care Provider: [facility] Medicine Team: [facility] Donal Mason MD  Primary Care Team: [facility]    HPI:   Ana Vasquez is a 83 y.o. 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 for BLE swelling. Patient reports she has chronic BLE swelling and usually wraps her legs in ace bandages. The legs are tender to touch bilaterally and chronically.  She denies any new skin change to her legs. Denies fever, chills, shortness of breath, chest pain, RAMACHANDRAN, PND, palpitations. She denies known bloody bowel movements. She states she really had no complaints and they are daughter reportedly saw her legs and wanted them checked out.    In the ED: Afebrile. . BP 92/52. Hbg 6.7. albumin 1.6.  (basleine). Troponin WNL. Lower extremity US without DVT. CXR Minimal subsegmental atelectasis left lung base, similar to 01/15/2024 exam. Given lasix PO in the ED and 1 unit pRBC ordered. Admitted to .     * No surgery found *      Hospital Course:   Pt admitted to [facility] and remained stable overnight and into 3/9/2024. Received 1u pRBCs with Hb improved to 7.5, and pt no longer symptomatic. No signs/symptoms of bleeding, suspect anemia of chronic disease. Pt deemed appropriate for discharge; seen and examined prior to departure. Plan discussed with pt, who was agreeable and amenable; medications were discussed and reviewed, outpatient follow-up scheduled, ER precautions were given, all questions were answered to the pt's satisfaction, and Ms. Vasquez was subsequently discharged.       Goals  of Care Treatment Preferences:  Code Status: Full Code      Consults:   Consults (From admission, onward)          Status Ordering Provider     Inpatient consult to PICC team (KRISTIE)  Once        Provider:  (Not yet assigned)    Acknowledged SADIE MATA            No new Assessment & Plan notes have been filed under this hospital service since the last note was generated.  Service: Hospital Medicine    Final Active Diagnoses:    Diagnosis Date Noted POA    PRINCIPAL PROBLEM:  Anemia requiring transfusions [D64.9] 03/08/2024 Yes    Lower extremity edema [R60.0] 12/02/2023 Yes    Chronic deep vein thrombosis (DVT) of proximal vein of right lower extremity [I82.4Y1] 07/21/2021 Yes    Debility [R53.81] 07/21/2021 Yes    Essential hypertension [I10] 07/01/2020 Yes    Chronic venous insufficiency [I87.2] 07/01/2020 Yes      Problems Resolved During this Admission:       Discharged Condition: stable    Disposition: Skilled Nursing Facility    Follow Up:   Follow-up Information       [facility] Schedule an appointment as soon as possible for a visit.    Specialties: Nursing Home Agency, SNF Agency  Contact information:  [address] [place], [place] [address]se [place] [address]  [phone]                           Patient Instructions:      Diet Cardiac     Notify your health care provider if you experience any of the following:  increased confusion or weakness     Notify your health care provider if you experience any of the following:  persistent dizziness, light-headedness, or visual disturbances     Notify your health care provider if you experience any of the following:  worsening rash     Notify your health care provider if you experience any of the following:  severe persistent headache     Notify your health care provider if you experience any of the following:  difficulty breathing or increased cough     Notify your health care provider if you experience any of the following:  severe uncontrolled pain     Notify your  health care provider if you experience any of the following:  persistent nausea and vomiting or diarrhea     Notify your health care provider if you experience any of the following:  temperature >100.4     Activity as tolerated       Significant Diagnostic Studies: Labs: All labs within the past 24 hours have been reviewed    Pending Diagnostic Studies:       Procedure Component Value Units Date/Time    Basic Metabolic Panel (BMP) [[id number]] Collected: 03/09/24 1014    Order Status: Sent Lab Status: In process Updated: 03/09/24 1014    Specimen: Blood     C-reactive protein [[id number]] Collected: 03/09/24 1014    Order Status: Sent Lab Status: In process Updated: 03/09/24 1014    Specimen: Blood     CBC with Automated Differential [[id number]] Collected: 03/09/24 1014    Order Status: Sent Lab Status: In process Updated: 03/09/24 1014    Specimen: Blood     Ferritin [[id number]] Collected: 03/09/24 1014    Order Status: Sent Lab Status: In process Updated: 03/09/24 1024    Specimen: Blood     Ferritin [[id number]] Collected: 03/09/24 1014    Order Status: Sent Lab Status: In process Updated: 03/09/24 1014    Specimen: Blood     Iron and TIBC [[id number]] Collected: 03/09/24 1014    Order Status: Sent Lab Status: In process Updated: 03/09/24 1014    Specimen: Blood     Magnesium [[id number]] Collected: 03/09/24 1014    Order Status: Sent Lab Status: In process Updated: 03/09/24 1014    Specimen: Blood     Sedimentation rate [[id number]] Collected: 03/09/24 1014    Order Status: Sent Lab Status: In process Updated: 03/09/24 1014    Specimen: Blood            Medications:  Reconciled Home Medications:      Medication List        CONTINUE taking these medications      apixaban 5 mg Tab  Commonly known as: ELIQUIS  Take 1 tablet (5 mg total) by mouth 2 (two) times daily. Must follow with PCP for refills if decided     furosemide 20 MG tablet  Commonly known as: LASIX  Take 1 tablet (20 mg total) by mouth daily as  needed (weigth gain of 3 lb in one week.).     hydroCHLOROthiazide 12.5 MG Tab  Commonly known as: HYDRODIURIL  Take 12.5 mg by mouth once daily.     multivitamin Tab  Take 1 tablet by mouth once daily.     nicotine 7 mg/24 hr  Commonly known as: NICODERM CQ  Place 1 patch onto the skin once daily.     pantoprazole 40 MG tablet  Commonly known as: PROTONIX  Take 1 tablet (40 mg total) by mouth once daily. Must follow to decide to refill     VITAMIN C 500 MG tablet  Generic drug: ascorbic acid (vitamin C)  Take 500 mg by mouth 2 (two) times daily.     vitamin D 1000 units Tab  Commonly known as: VITAMIN D3  Take 1 tablet (1,000 Units total) by mouth once daily.              Indwelling Lines/Drains at time of discharge:   Lines/Drains/Airways       None                   Time spent on the discharge of patient: 35 minutes         Donal Mason MD  Attending Physician  Medical Director - [facility] Observation Unit  Department of Hospital Medicine  3/9/2024

## 2024-03-09 NOTE — PLAN OF CARE
[facility] - Emergency Dept  Discharge Final Note    [facility] Provider: [facility]    Expected Discharge Date: 3/9/2024    Final Discharge Note (most recent)       Final Note - 03/09/24 1524          Final Note    Assessment Type Final Discharge Note     Anticipated Discharge Disposition correction Nursing Facility (P)      Hospital Resources/Appts/Education Provided Provided patient/caregiver with written discharge plan information        Post-Acute Status    Discharge Delays None known at this time                     Important Message from Medicare             Contact Info       [facility]   Specialty: Nursing Home Agency, SNF Agency   Relationship: PCP - [facility] [id number]   Phone: [phone]       Next Steps: Schedule an appointment as soon as possible for a visit

## 2024-03-09 NOTE — ASSESSMENT & PLAN NOTE
Patient with Chronic debility due to age-related physical debility. Latest AMPAC and GEMS scores have not been reviewed. Evaluation for etiology is complete. Plan includes progressive mobility protocol initated and fall precautions in place.

## 2024-03-09 NOTE — ASSESSMENT & PLAN NOTE
Chronic, controlled. Latest blood pressure and vitals reviewed-   Temp:  [98 °F (36.7 °C)-98.2 °F (36.8 °C)]   Pulse:  []   Resp:  [18-20]   BP: ()/(52-55)   SpO2:  [96 %-100 %] .   Home meds for hypertension were reviewed and noted below.   Hypertension Medications               furosemide (LASIX) 20 MG tablet Take 1 tablet (20 mg total) by mouth daily as needed (weigth gain of 3 lb in one week.).    hydroCHLOROthiazide (HYDRODIURIL) 12.5 MG Tab Take 12.5 mg by mouth once daily.        While in the hospital, will manage blood pressure as follows; Continue home antihypertensive regimen  Will utilize p.r.n. blood pressure medication only if patient's blood pressure greater than 180/110 and she develops symptoms such as worsening chest pain or shortness of breath.

## 2024-03-09 NOTE — PLAN OF CARE
NURSING HOME ORDERS    03/09/2024  [facility]  [facility] - EMERGENCY DEPT  [address]  [facility] [address]  Dept: [phone]  Loc: [phone]     Admit to Nursing Home:  Return to snf NH    Diagnoses:  Active Hospital Problems    Diagnosis  POA    *Anemia requiring transfusions [D64.9]  Yes    Lower extremity edema [R60.0]  Yes    Chronic deep vein thrombosis (DVT) of proximal vein of right lower extremity [I82.4Y1]  Yes     Pt is an 81yoF with PMHx of chronic venous insufficiency/venous HTN s/p stent previously followed by vascular surgery in [place] who presented to the ED on 7/21 complaining of worsening swelling and pain her RLE x1 day. She tested positive for COVID-19 in the ED, but she denies any cough, fatigue, malaise, fevers, chills, nausea, or vomiting. An US was performed on her leg, revealing a DVT.       Vascular surgery was consulted for management of her DVT. Pt has a history of DVT in her left leg, and she states that she was on anticoagulation injections of some sort, but she stopped taking the injections a while back. She denies taking any oral anticoagulants. She also reports daily smoking. Pt reports that prior to the swelling that started a couple days ago, she was able to walk around with no issues. Now she is unable to walk due to extensive swelling. Baseline swelling and skin color changes appreciated on her left leg, but her right leg was notable more edematous. +3 pitting edema to her right leg appreciated on exam. Right DP signals appreciated on doppler. Pt denies any sensory deficits and was able to move her toes.          Debility [R53.81]  Yes    Essential hypertension [I10]  Yes    Chronic venous insufficiency [I87.2]  Yes      Resolved Hospital Problems   No resolved problems to display.       Patient is homebound due to:  Anemia requiring transfusions    Allergies:Review of patient's allergies indicates:  No Known Allergies    Vitals:   Routine    Diet: cardiac diet    Activities:   Activity as tolerated    Goals of Care Treatment Preferences:  Code Status: Full Code      Labs:  Routine    Nursing Precautions:  Fall and Pressure ulcer prevention    Consults:   NA     Miscellaneous Care: Routine Skin for Bedridden Patients:  Apply moisture barrier cream to all                   Diabetes Care:  NA      Medications: Discontinue all previous medication orders, if any. See new list below.     Medication List        CONTINUE taking these medications      apixaban 5 mg Tab  Commonly known as: ELIQUIS  Take 1 tablet (5 mg total) by mouth 2 (two) times daily. Must follow with PCP for refills if decided     furosemide 20 MG tablet  Commonly known as: LASIX  Take 1 tablet (20 mg total) by mouth daily as needed (weigth gain of 3 lb in one week.).     hydroCHLOROthiazide 12.5 MG Tab  Commonly known as: HYDRODIURIL  Take 12.5 mg by mouth once daily.     multivitamin Tab  Take 1 tablet by mouth once daily.     nicotine 7 mg/24 hr  Commonly known as: NICODERM CQ  Place 1 patch onto the skin once daily.     pantoprazole 40 MG tablet  Commonly known as: PROTONIX  Take 1 tablet (40 mg total) by mouth once daily. Must follow to decide to refill     VITAMIN C 500 MG tablet  Generic drug: ascorbic acid (vitamin C)  Take 500 mg by mouth 2 (two) times daily.     vitamin D 1000 units Tab  Commonly known as: VITAMIN D3  Take 1 tablet (1,000 Units total) by mouth once daily.            Donal Mason MD  Attending Physician  Medical Director - [facility] Observation Unit  Department of Hospital Medicine  3/9/2024

## 2024-03-09 NOTE — HOSPITAL COURSE
[place] [facility] [place] overnight and into 3/9/2024. Received 1u pRBCs with Hb improved to 7.5, and pt no longer symptomatic. No signs/symptoms of bleeding, suspect anemia of chronic disease. Pt deemed appropriate for discharge; seen and examined prior to departure. Plan discussed with pt, who was agreeable and amenable; medications were discussed and reviewed, outpatient follow-up scheduled, ER precautions were given, all questions were answered to the pt's satisfaction, and Ms. Vasquez was subsequently discharged.

## 2024-03-09 NOTE — H&P
"  [facility] - Emergency [facility] Medicine  History & Physical    Patient Name: Ana Vasquez  MRN: [id number]  Patient Class: OP- Observation  Admission Date: 3/8/2024  Attending Physician: Donal Mason MD   Primary Care Provider: [facility]         Patient information was obtained from patient, past medical records, and ER records.     Subjective:     Principal Problem:Anemia requiring transfusions    Chief Complaint:   Chief Complaint   Patient presents with    Leg Swelling     Pt presents to ED via EMS from [facility] w/ c/o bilateral LE edema. EMS reports BLE at baseline; according to patient, "it is worse than normal."        HPI: Ana Vasquez is a 83 y.o. female with history of chronic DVT on eliquis, dementia, presenting from [facility] for BLE swelling. Patient reports she has chronic BLE swelling and usually wraps her legs in ace bandages. The legs are tender to touch bilaterally and chronically.  She denies any new skin change to her legs. Denies fever, chills, shortness of breath, chest pain, RAMACHANDRAN, PND, palpitations. She denies known bloody bowel movements. She states she really had no complaints and they are daughter reportedly saw her legs and wanted them checked out.    In the ED: Afebrile. . BP 92/52. Hbg 6.7. albumin 1.6.  (basleine). Troponin WNL. Lower extremity US without DVT. CXR Minimal subsegmental atelectasis left lung base, similar to 01/15/2024 exam. Given lasix PO in the ED and 1 unit pRBC ordered. Admitted to .     Past Medical History:   Diagnosis Date    Anticoagulant long-term use        Past Surgical History:   Procedure Laterality Date    COLONOSCOPY N/A 11/22/2021    Procedure: COLONOSCOPY;  Surgeon: Charbel Crooks MD;  Location: [facility] ([address] [place] [address]);  Service: Endoscopy;  Laterality: N/A;    COLONOSCOPY N/A 12/8/2023    Procedure: COLONOSCOPY;  Surgeon: Charbel Crooks MD;  Location: [facility] ([address] [place] [address]);  Service: Endoscopy;  " Laterality: N/A;    HYSTERECTOMY         Review of patient's allergies indicates:  No Known Allergies    No current facility-administered medications on file prior to encounter.     Current Outpatient Medications on File Prior to Encounter   Medication Sig    apixaban (ELIQUIS) 5 mg Tab Take 1 tablet (5 mg total) by mouth 2 (two) times daily. Must follow with PCP for refills if decided    ascorbic acid, vitamin C, (VITAMIN C) 500 MG tablet Take 500 mg by mouth 2 (two) times daily.    furosemide (LASIX) 20 MG tablet Take 1 tablet (20 mg total) by mouth daily as needed (weigth gain of 3 lb in one week.).    hydroCHLOROthiazide (HYDRODIURIL) 12.5 MG Tab Take 12.5 mg by mouth once daily.    multivitamin Tab Take 1 tablet by mouth once daily.    nicotine (NICODERM CQ) 7 mg/24 hr Place 1 patch onto the skin once daily.    pantoprazole (PROTONIX) 40 MG tablet Take 1 tablet (40 mg total) by mouth once daily. Must follow to decide to refill    vitamin D (VITAMIN D3) 1000 units Tab Take 1 tablet (1,000 Units total) by mouth once daily.     Family History       Problem Relation (Age of Onset)    Cancer Maternal Grandfather    Diabetes Mother, Father          Tobacco Use    Smoking status: Former     Current packs/day: 0.50     Average packs/day: 0.5 packs/day for 66.0 years (33.0 ttl pk-yrs)     Types: Cigarettes    Smokeless tobacco: Never    Tobacco comments:     1 pack every 3 days   Substance and Sexual Activity    Alcohol use: Not Currently     Comment: occasionally    Drug use: Never    Sexual activity: Not Currently     Review of Systems   Constitutional:  Negative for chills and fever.   HENT:  Negative for trouble swallowing.    Eyes:  Negative for photophobia and visual disturbance.   Respiratory:  Negative for cough and shortness of breath.    Cardiovascular:  Positive for leg swelling. Negative for chest pain.   Gastrointestinal:  Negative for constipation, diarrhea, nausea and vomiting.   Genitourinary:  Negative  for dysuria and flank pain.   Musculoskeletal:  Positive for gait problem (reports she can use rollator to take a few steps) and myalgias. Negative for joint swelling.   Skin:  Positive for color change (chronic skin chnages BLE).   Neurological:  Negative for dizziness and headaches.   Psychiatric/Behavioral:  Positive for agitation. The patient is not nervous/anxious.      Objective:     Vital Signs (Most Recent):  Temp: 98.1 °F (36.7 °C) (03/08/24 2113)  Pulse: 100 (03/08/24 2113)  Resp: 20 (03/08/24 2113)  BP: (!) 118/55 (03/08/24 2113)  SpO2: 100 % (03/08/24 2113) Vital Signs (24h Range):  Temp:  [98 °F (36.7 °C)-98.2 °F (36.8 °C)] 98.1 °F (36.7 °C)  Pulse:  [] 100  Resp:  [18-20] 20  SpO2:  [96 %-100 %] 100 %  BP: ()/(52-55) 118/55     Weight: 79.4 kg (175 lb)  Body mass index is 34.18 kg/m².     Physical Exam  Vitals and nursing note reviewed.   Constitutional:       General: She is not in acute distress.     Appearance: She is obese.   HENT:      Head: Normocephalic and atraumatic.      Mouth/Throat:      Pharynx: No oropharyngeal exudate.   Eyes:      Extraocular Movements: Extraocular movements intact.      Conjunctiva/sclera: Conjunctivae normal.   Cardiovascular:      Rate and Rhythm: Regular rhythm. Tachycardia present.      Heart sounds: Normal heart sounds.   Pulmonary:      Effort: Pulmonary effort is normal. No respiratory distress.      Breath sounds: Normal breath sounds.   Abdominal:      General: Bowel sounds are normal.      Palpations: Abdomen is soft.   Musculoskeletal:         General: Tenderness present. Normal range of motion.      Cervical back: Normal range of motion.      Right lower leg: Edema present.      Left lower leg: Edema present.      Comments: 3+ pitting edema BLE   Skin:     General: Skin is warm and dry.      Findings: Erythema (stasis dermatitis) present.   Neurological:      Mental Status: She is alert and oriented to person, place, and time.      Comments:  Oriented to person and place. Disoriented to time. Situation. Believes she is living at home with her daughter. History of dementia per NH records.    Psychiatric:         Behavior: Behavior is agitated.         Cognition and Memory: Memory is impaired.                Significant Labs: All pertinent labs within the past 24 hours have been reviewed.  CBC:   Recent Labs   Lab 03/08/24 1907   WBC 8.38   HGB 6.7*   HCT 22.4*        CMP:   Recent Labs   Lab 03/08/24 1907      K 3.5      CO2 27   GLU 86   BUN 22   CREATININE 1.1   CALCIUM 7.3*   PROT 5.9*   ALBUMIN 1.6*   BILITOT 0.3   ALKPHOS 87   AST 30   ALT 13   ANIONGAP 10     Cardiac Markers:   Recent Labs   Lab 03/08/24 1907   *     Troponin:   Recent Labs   Lab 03/08/24 1907   TROPONINI 0.007       Significant Imaging: I have reviewed all pertinent imaging results/findings within the past 24 hours.  US Lower Extremity Veins Bilateral  Narrative: EXAMINATION:  US LOWER EXTREMITY VEINS BILATERAL    CLINICAL HISTORY:  Acute embolism and thrombosis of right femoral vein    TECHNIQUE:  Duplex and color flow Doppler and dynamic compression was performed of the bilateral lower extremity veins was performed.    COMPARISON:  Ultrasound lower extremity veins 01/15/2024    FINDINGS:  Right thigh veins: The common femoral, femoral, popliteal, and upper greater saphenous veins are patent and free of thrombus. The veins are normally compressible and have normal phasic flow and augmentation response.  Note made of prior exam demonstrating right common and superficial femoral nonocclusive thrombus.    Right calf veins: The visualized calf veins are patent.  Note made of prior exam demonstrating posterior tibial vein nonocclusive thrombus.    Left thigh veins: The common femoral, femoral, popliteal, and upper greater saphenous veins are patent and free of thrombus. The veins are normally compressible and have normal phasic flow and augmentation  response.    Left calf veins: The visualized calf veins are patent.    Miscellaneous: Bilateral subcutaneous edema.  Impression: Prior right lower extremity nonocclusive venous thrombus is no longer visualized.  No evidence of deep venous thrombosis in either lower extremity.    Electronically signed by resident: Juwan Avalos  Date:    03/08/2024  Time:    19:01    Electronically signed by: Dany Nichols MD  Date:    03/08/2024  Time:    19:08  X-Ray Chest AP Portable  Narrative: EXAMINATION:  XR CHEST AP PORTABLE    CLINICAL HISTORY:  CHF;    TECHNIQUE:  Single frontal view of the chest was performed.    COMPARISON:  01/15/2024    FINDINGS:  The cardiac silhouette is midline.  The pulmonary vascularity is normal.    Subsegmental atelectasis at the left lung base.    No focal airspace disease.    No pleural effusion.  No pneumothorax.    The osseous structures appear within normal limits.  Impression: Minimal subsegmental atelectasis left lung base, similar to 01/15/2024 exam.    Electronically signed by: Li Benjamin MD  Date:    03/08/2024  Time:    16:28     Assessment/Plan:     * Anemia requiring transfusions  Patient's anemia is currently uncontrolled. Has received 1 units of PRBCs on 3/8/24 . Etiology likely d/t  suspect anemia of chronic disease  Current CBC reviewed-   Lab Results   Component Value Date    HGB 6.7 (L) 03/08/2024    HCT 22.4 (L) 03/08/2024     Monitor serial CBC and transfuse if patient becomes hemodynamically unstable, symptomatic or H/H drops below 7/21.  Patient admitted in November/December 2023 for anemia with suspected GIB. Colonoscopy/EGD at that time deferred for outpatient as patient did not have capacity to consent and family was not able to be reached.     1 unit pRBC in ED ordered by ED  Monitor H/H, consider GI consult if worsening anemia in AM  Check stool for occult blood    Lower extremity edema  Chronic. NH or daughter reportedly had concern that legs more swollen  than baseline. Patient reports swelling is about baseline. She wraps them with ace bandages normally at home.    -BNP chronically elevated. CXR without acute change  -NO SOB, RAMACHANDRAN, PND. On room air.   -DVT US without DVT  -Last echo dated 12/4/23 below  -Continue lasix, supportive care  -Venous stasis dermatitis noted. Check CRP in AM though low suspicion for cellulitis currently.    Results for orders placed during the hospital encounter of 11/27/23    Echo    Interpretation Summary    Left Ventricle: The left ventricle is normal in size. Normal wall thickness. There is concentric remodeling. Normal wall motion. There is normal systolic function with a visually estimated ejection fraction of 60 - 65%. Ejection fraction by visual approximation is 63%. There is normal diastolic function.    Right Ventricle: Normal right ventricular cavity size. Wall thickness is normal. Right ventricle wall motion  is normal. Systolic function is normal.    Left Atrium: Left atrium is severely dilated.    Right Atrium: Right atrium is mildly dilated.    Aortic Valve: The aortic valve is a trileaflet valve. There is moderate aortic valve sclerosis. There is annular calcification present.    Mitral Valve: There is mild bileaflet sclerosis. There is moderate mitral annular calcification present. There is mild regurgitation.    Tricuspid Valve: There is mild regurgitation.    Pulmonary Artery: PASP probably in upper 30s to at most low to mid 40s.     Debility  Patient with Chronic debility due to age-related physical debility. Latest AMPAC and GEMS scores have not been reviewed. Evaluation for etiology is complete. Plan includes progressive mobility protocol initated and fall precautions in place.    Chronic deep vein thrombosis (DVT) of proximal vein of right lower extremity  Continue eliquis 5 mg BID  DVT US on admission negative for DVT      Essential hypertension  Chronic, controlled. Latest blood pressure and vitals reviewed-   Temp:   [98 °F (36.7 °C)-98.2 °F (36.8 °C)]   Pulse:  []   Resp:  [18-20]   BP: ()/(52-55)   SpO2:  [96 %-100 %] .   Home meds for hypertension were reviewed and noted below.   Hypertension Medications               furosemide (LASIX) 20 MG tablet Take 1 tablet (20 mg total) by mouth daily as needed (weigth gain of 3 lb in one week.).    hydroCHLOROthiazide (HYDRODIURIL) 12.5 MG Tab Take 12.5 mg by mouth once daily.        While in the hospital, will manage blood pressure as follows; Continue home antihypertensive regimen  Will utilize p.r.n. blood pressure medication only if patient's blood pressure greater than 180/110 and she develops symptoms such as worsening chest pain or shortness of breath.      VTE Risk Mitigation (From admission, onward)           Ordered     Reason for No Pharmacological VTE Prophylaxis  Once        Question:  Reasons:  Answer:  Already adequately anticoagulated on oral Anticoagulants    03/08/24 2140     IP VTE HIGH RISK PATIENT  Once         03/08/24 2140     Place sequential compression device  Until discontinued         03/08/24 2122                         On 03/08/2024, patient should be placed in hospital observation services under my care in collaboration with Dany Anthony MD.           Claudia Castorena PA-C  Department of Hospital Medicine  [facility] - Emergency Dept

## 2024-03-09 NOTE — SUBJECTIVE & OBJECTIVE
Past Medical History:   Diagnosis Date    Anticoagulant long-term use        Past Surgical History:   Procedure Laterality Date    COLONOSCOPY N/A 11/22/2021    Procedure: COLONOSCOPY;  Surgeon: Charbel Crooks MD;  Location: [facility] ([address] [place] [address]);  Service: Endoscopy;  Laterality: N/A;    COLONOSCOPY N/A 12/8/2023    Procedure: COLONOSCOPY;  Surgeon: Charbel Crooks MD;  Location: [facility] ([address] [place] [address]);  Service: Endoscopy;  Laterality: N/A;    HYSTERECTOMY         Review of patient's allergies indicates:  No Known Allergies    No current facility-administered medications on file prior to encounter.     Current Outpatient Medications on File Prior to Encounter   Medication Sig    apixaban (ELIQUIS) 5 mg Tab Take 1 tablet (5 mg total) by mouth 2 (two) times daily. Must follow with PCP for refills if decided    ascorbic acid, vitamin C, (VITAMIN C) 500 MG tablet Take 500 mg by mouth 2 (two) times daily.    furosemide (LASIX) 20 MG tablet Take 1 tablet (20 mg total) by mouth daily as needed (weigth gain of 3 lb in one week.).    hydroCHLOROthiazide (HYDRODIURIL) 12.5 MG Tab Take 12.5 mg by mouth once daily.    multivitamin Tab Take 1 tablet by mouth once daily.    nicotine (NICODERM CQ) 7 mg/24 hr Place 1 patch onto the skin once daily.    pantoprazole (PROTONIX) 40 MG tablet Take 1 tablet (40 mg total) by mouth once daily. Must follow to decide to refill    vitamin D (VITAMIN D3) 1000 units Tab Take 1 tablet (1,000 Units total) by mouth once daily.     Family History       Problem Relation (Age of Onset)    Cancer Maternal Grandfather    Diabetes Mother, Father          Tobacco Use    Smoking status: Former     Current packs/day: 0.50     Average packs/day: 0.5 packs/day for 66.0 years (33.0 ttl pk-yrs)     Types: Cigarettes    Smokeless tobacco: Never    Tobacco comments:     1 pack every 3 days   Substance and Sexual Activity    Alcohol use: Not Currently     Comment: occasionally    Drug use: Never    Sexual  activity: Not Currently     Review of Systems   Constitutional:  Negative for chills and fever.   HENT:  Negative for trouble swallowing.    Eyes:  Negative for photophobia and visual disturbance.   Respiratory:  Negative for cough and shortness of breath.    Cardiovascular:  Positive for leg swelling. Negative for chest pain.   Gastrointestinal:  Negative for constipation, diarrhea, nausea and vomiting.   Genitourinary:  Negative for dysuria and flank pain.   Musculoskeletal:  Positive for gait problem (reports she can use rollator to take a few steps) and myalgias. Negative for joint swelling.   Skin:  Positive for color change (chronic skin chnages BLE).   Neurological:  Negative for dizziness and headaches.   Psychiatric/Behavioral:  Positive for agitation. The patient is not nervous/anxious.      Objective:     Vital Signs (Most Recent):  Temp: 98.1 °F (36.7 °C) (03/08/24 2113)  Pulse: 100 (03/08/24 2113)  Resp: 20 (03/08/24 2113)  BP: (!) 118/55 (03/08/24 2113)  SpO2: 100 % (03/08/24 2113) Vital Signs (24h Range):  Temp:  [98 °F (36.7 °C)-98.2 °F (36.8 °C)] 98.1 °F (36.7 °C)  Pulse:  [] 100  Resp:  [18-20] 20  SpO2:  [96 %-100 %] 100 %  BP: ()/(52-55) 118/55     Weight: 79.4 kg (175 lb)  Body mass index is 34.18 kg/m².     Physical Exam  Vitals and nursing note reviewed.   Constitutional:       General: She is not in acute distress.     Appearance: She is obese.   HENT:      Head: Normocephalic and atraumatic.      Mouth/Throat:      Pharynx: No oropharyngeal exudate.   Eyes:      Extraocular Movements: Extraocular movements intact.      Conjunctiva/sclera: Conjunctivae normal.   Cardiovascular:      Rate and Rhythm: Regular rhythm. Tachycardia present.      Heart sounds: Normal heart sounds.   Pulmonary:      Effort: Pulmonary effort is normal. No respiratory distress.      Breath sounds: Normal breath sounds.   Abdominal:      General: Bowel sounds are normal.      Palpations: Abdomen is soft.    Musculoskeletal:         General: Tenderness present. Normal range of motion.      Cervical back: Normal range of motion.      Right lower leg: Edema present.      Left lower leg: Edema present.      Comments: 3+ pitting edema BLE   Skin:     General: Skin is warm and dry.      Findings: Erythema (stasis dermatitis) present.   Neurological:      Mental Status: She is alert and oriented to person, place, and time.      Comments: Oriented to person and place. Disoriented to time. Situation. Believes she is living at home with her daughter. History of dementia per NH records.    Psychiatric:         Behavior: Behavior is agitated.         Cognition and Memory: Memory is impaired.                Significant Labs: All pertinent labs within the past 24 hours have been reviewed.  CBC:   Recent Labs   Lab 03/08/24 1907   WBC 8.38   HGB 6.7*   HCT 22.4*        CMP:   Recent Labs   Lab 03/08/24 1907      K 3.5      CO2 27   GLU 86   BUN 22   CREATININE 1.1   CALCIUM 7.3*   PROT 5.9*   ALBUMIN 1.6*   BILITOT 0.3   ALKPHOS 87   AST 30   ALT 13   ANIONGAP 10     Cardiac Markers:   Recent Labs   Lab 03/08/24 1907   *     Troponin:   Recent Labs   Lab 03/08/24 1907   TROPONINI 0.007       Significant Imaging: I have reviewed all pertinent imaging results/findings within the past 24 hours.  US Lower Extremity Veins Bilateral  Narrative: EXAMINATION:  US LOWER EXTREMITY VEINS BILATERAL    CLINICAL HISTORY:  Acute embolism and thrombosis of right femoral vein    TECHNIQUE:  Duplex and color flow Doppler and dynamic compression was performed of the bilateral lower extremity veins was performed.    COMPARISON:  Ultrasound lower extremity veins 01/15/2024    FINDINGS:  Right thigh veins: The common femoral, femoral, popliteal, and upper greater saphenous veins are patent and free of thrombus. The veins are normally compressible and have normal phasic flow and augmentation response.  Note made of prior  exam demonstrating right common and superficial femoral nonocclusive thrombus.    Right calf veins: The visualized calf veins are patent.  Note made of prior exam demonstrating posterior tibial vein nonocclusive thrombus.    Left thigh veins: The common femoral, femoral, popliteal, and upper greater saphenous veins are patent and free of thrombus. The veins are normally compressible and have normal phasic flow and augmentation response.    Left calf veins: The visualized calf veins are patent.    Miscellaneous: Bilateral subcutaneous edema.  Impression: Prior right lower extremity nonocclusive venous thrombus is no longer visualized.  No evidence of deep venous thrombosis in either lower extremity.    Electronically signed by resident: Juwan Avalos  Date:    03/08/2024  Time:    19:01    Electronically signed by: Dany Nichols MD  Date:    03/08/2024  Time:    19:08  X-Ray Chest AP Portable  Narrative: EXAMINATION:  XR CHEST AP PORTABLE    CLINICAL HISTORY:  CHF;    TECHNIQUE:  Single frontal view of the chest was performed.    COMPARISON:  01/15/2024    FINDINGS:  The cardiac silhouette is midline.  The pulmonary vascularity is normal.    Subsegmental atelectasis at the left lung base.    No focal airspace disease.    No pleural effusion.  No pneumothorax.    The osseous structures appear within normal limits.  Impression: Minimal subsegmental atelectasis left lung base, similar to 01/15/2024 exam.    Electronically signed by: Li Benjamin MD  Date:    03/08/2024  Time:    16:28

## 2024-03-09 NOTE — NURSING
2 attempts made to obtain new IV access were unsuccessful. Patient refused further attempts for labs and IV access.

## 2024-03-09 NOTE — ASSESSMENT & PLAN NOTE
Patient's anemia is currently uncontrolled. Has received 1 units of PRBCs on 3/8/24 . Etiology likely d/t  suspect anemia of chronic disease  Current CBC reviewed-   Lab Results   Component Value Date    HGB 6.7 (L) 03/08/2024    HCT 22.4 (L) 03/08/2024     Monitor serial CBC and transfuse if patient becomes hemodynamically unstable, symptomatic or H/H drops below 7/21.  Patient admitted in November/December 2023 for anemia with suspected GIB. Colonoscopy/EGD at that time deferred for outpatient as patient did not have capacity to consent and family was not able to be reached.     1 unit pRBC in ED ordered by ED  Monitor H/H, consider GI consult if worsening anemia in AM  Check stool for occult blood

## 2024-03-09 NOTE — PLAN OF CARE
[facility] - Emergency Dept  Discharge Final Note    Primary Care Provider: [facility]    Expected Discharge Date: 3/9/2024    Final Discharge Note (most recent)       Final Note - 03/09/24 1524          Final Note    Assessment Type Final Discharge Note (P)      Anticipated Discharge Disposition Home or Self Care (P)      Hospital Resources/Appts/Education Provided Provided patient/caregiver with written discharge plan information (P)         Post-Acute Status    Discharge Delays None known at this time (P)                      Important Message from Medicare             Contact Info       [facility]   Specialty: Nursing Home Agency, SNF Agency   Relationship: PCP - General    [place] [address]   Phone: [phone]       Next Steps: Schedule an appointment as soon as possible for a visit

## 2024-03-09 NOTE — ED NOTES
Assumed care of pt at this time. VSS, RR even and unlabored. Resting in bed comfortably. No voiced compaints of pain or discomfort at this time. Safety protocols remain intact.  Patient in gown and placed on continuous cardiac monitoring, pulse ox and BP cuff.  Patient now allowing for blood draw.

## 2024-03-09 NOTE — ASSESSMENT & PLAN NOTE
Chronic. NH or daughter reportedly had concern that legs more swollen than baseline. Patient reports swelling is about baseline. She wraps them with ace bandages normally at home.    -BNP chronically elevated. CXR without acute change  -NO SOB, RAMACHANDRAN, PND. On room air.   -DVT US without DVT  -Last echo dated 12/4/23 below  -Continue lasix, supportive care  -Venous stasis dermatitis noted. Check CRP in AM though low suspicion for cellulitis currently.    Results for orders placed during the hospital encounter of 11/27/23    Echo    Interpretation Summary    Left Ventricle: The left ventricle is normal in size. Normal wall thickness. There is concentric remodeling. Normal wall motion. There is normal systolic function with a visually estimated ejection fraction of 60 - 65%. Ejection fraction by visual approximation is 63%. There is normal diastolic function.    Right Ventricle: Normal right ventricular cavity size. Wall thickness is normal. Right ventricle wall motion  is normal. Systolic function is normal.    Left Atrium: Left atrium is severely dilated.    Right Atrium: Right atrium is mildly dilated.    Aortic Valve: The aortic valve is a trileaflet valve. There is moderate aortic valve sclerosis. There is annular calcification present.    Mitral Valve: There is mild bileaflet sclerosis. There is moderate mitral annular calcification present. There is mild regurgitation.    Tricuspid Valve: There is mild regurgitation.    Pulmonary Artery: PASP probably in upper 30s to at most low to mid 40s.

## 2024-03-09 NOTE — PLAN OF CARE
[facility] - Emergency Dept  Initial Discharge Assessment       Primary Care Provider: [facility]    Admission Diagnosis: Symptomatic anemia [D64.9]    Admission Date: 3/8/2024  Expected Discharge Date: 3/9/2024    Transition of Care Barriers: (P) None    Payor: MEDICARE / Plan: MEDICARE PART A & B / Product Type: Government /     Extended Emergency Contact Information  Primary Emergency Contact: Brianna Oneil  Mobile Phone: [phone]  Relation: Daughter  Preferred language: English   needed? No  Secondary Emergency Contact: Alyssia Odonnell  Mobile Phone: [phone]  Relation: Grandchild   needed? No    Discharge Plan A: (P) Return to nursing home         [id number] - [place] - [address]  [address]  [place] [address]  Phone: [phone] Fax: [phone]      Initial Assessment (most recent)       Adult Discharge Assessment - 03/09/24 1509          Discharge Assessment    Assessment Type Discharge Planning Assessment (P)      Confirmed/corrected address, phone number and insurance Yes (P)      Confirmed Demographics Correct on Facesheet (P)      Source of Information patient;family;health record (P)      People in Home facility resident (P)      Do you expect to return to your current living situation? Yes (P)      Prior to hospitilization cognitive status: Alert/Oriented (P)      Current cognitive status: Alert/Oriented (P)      Patient currently being followed by outpatient case management? No (P)      Do you currently have service(s) that help you manage your care at home? No (P)      Do you take prescription medications? Yes (P)      Do you have prescription coverage? Yes (P)      Do you have any problems affording any of your prescribed medications? No (P)      Is the patient taking medications as prescribed? yes (P)      How do you get to doctors appointments? health plan transportation (P)      Are you on dialysis? No (P)      Discharge Plan A  Return to nursing home (P)      DME Needed Upon Discharge  none (P)      Transition of Care Barriers None (P)         Physical Activity    On average, how many days per week do you engage in moderate to strenuous exercise (like a brisk walk)? 0 days (P)      On average, how many minutes do you engage in exercise at this level? 0 min (P)         Financial Resource Strain    How hard is it for you to pay for the very basics like food, housing, medical care, and heating? Not very hard (P)         Housing Stability    In the last 12 months, was there a time when you were not able to pay the mortgage or rent on time? No (P)      In the last 12 months, was there a time when you did not have a steady place to sleep or slept in a shelter (including now)? No (P)         Transportation Needs    In the past 12 months, has lack of transportation kept you from medical appointments or from getting medications? No (P)      In the past 12 months, has lack of transportation kept you from meetings, work, or from getting things needed for daily living? No (P)         Food Insecurity    Within the past 12 months, you worried that your food would run out before you got the money to buy more. Never true (P)         Stress    Do you feel stress - tense, restless, nervous, or anxious, or unable to sleep at night because your mind is troubled all the time - these days? Not at all (P)         Social Connections    In a typical week, how many times do you talk on the phone with family, friends, or neighbors? Twice a week (P)      How often do you get together with friends or relatives? Twice a week (P)      How often do you attend Yarsani or Evangelical services? Never (P)      Do you belong to any clubs or organizations such as Yarsani groups, unions, fraternal or athletic groups, or school groups? No (P)      How often do you attend meetings of the clubs or organizations you belong to? Never (P)         Alcohol Use    Q1: How often do you have  a drink containing alcohol? Never (P)      Q2: How many drinks containing alcohol do you have on a typical day when you are drinking? Patient does not drink (P)

## 2024-10-02 ENCOUNTER — PATIENT MESSAGE (OUTPATIENT)
Dept: RESEARCH | Facility: CLINIC | Age: 84
End: 2024-10-02
Payer: MEDICARE